# Patient Record
Sex: MALE | Race: WHITE | NOT HISPANIC OR LATINO | Employment: FULL TIME | URBAN - METROPOLITAN AREA
[De-identification: names, ages, dates, MRNs, and addresses within clinical notes are randomized per-mention and may not be internally consistent; named-entity substitution may affect disease eponyms.]

---

## 2018-10-25 ENCOUNTER — OFFICE VISIT (OUTPATIENT)
Dept: INTERNAL MEDICINE | Facility: CLINIC | Age: 48
End: 2018-10-25
Payer: COMMERCIAL

## 2018-10-25 ENCOUNTER — LAB VISIT (OUTPATIENT)
Dept: LAB | Facility: HOSPITAL | Age: 48
End: 2018-10-25
Attending: PHYSICIAN ASSISTANT
Payer: COMMERCIAL

## 2018-10-25 VITALS
TEMPERATURE: 99 F | DIASTOLIC BLOOD PRESSURE: 90 MMHG | WEIGHT: 173.06 LBS | BODY MASS INDEX: 23.44 KG/M2 | HEART RATE: 60 BPM | HEIGHT: 72 IN | SYSTOLIC BLOOD PRESSURE: 132 MMHG

## 2018-10-25 DIAGNOSIS — I10 ESSENTIAL HYPERTENSION: ICD-10-CM

## 2018-10-25 DIAGNOSIS — M72.2 PLANTAR FASCIITIS: Primary | ICD-10-CM

## 2018-10-25 DIAGNOSIS — E10.8 TYPE 1 DIABETES MELLITUS WITH COMPLICATION: ICD-10-CM

## 2018-10-25 PROBLEM — E10.9 DIABETES MELLITUS TYPE I: Status: ACTIVE | Noted: 2018-10-25

## 2018-10-25 LAB
ANION GAP SERPL CALC-SCNC: 9 MMOL/L
BUN SERPL-MCNC: 15 MG/DL
CALCIUM SERPL-MCNC: 10.3 MG/DL
CHLORIDE SERPL-SCNC: 99 MMOL/L
CO2 SERPL-SCNC: 29 MMOL/L
CREAT SERPL-MCNC: 1.4 MG/DL
EST. GFR  (AFRICAN AMERICAN): >60 ML/MIN/1.73 M^2
EST. GFR  (NON AFRICAN AMERICAN): 59.8 ML/MIN/1.73 M^2
ESTIMATED AVG GLUCOSE: 217 MG/DL
GLUCOSE SERPL-MCNC: 230 MG/DL
HBA1C MFR BLD HPLC: 9.2 %
POTASSIUM SERPL-SCNC: 4.2 MMOL/L
SODIUM SERPL-SCNC: 137 MMOL/L

## 2018-10-25 PROCEDURE — 99999 PR PBB SHADOW E&M-NEW PATIENT-LVL III: CPT | Mod: PBBFAC,,, | Performed by: PHYSICIAN ASSISTANT

## 2018-10-25 PROCEDURE — 99204 OFFICE O/P NEW MOD 45 MIN: CPT | Mod: S$GLB,,, | Performed by: PHYSICIAN ASSISTANT

## 2018-10-25 PROCEDURE — 36415 COLL VENOUS BLD VENIPUNCTURE: CPT | Mod: PO

## 2018-10-25 PROCEDURE — 3046F HEMOGLOBIN A1C LEVEL >9.0%: CPT | Mod: CPTII,S$GLB,, | Performed by: PHYSICIAN ASSISTANT

## 2018-10-25 PROCEDURE — 83036 HEMOGLOBIN GLYCOSYLATED A1C: CPT

## 2018-10-25 PROCEDURE — 3008F BODY MASS INDEX DOCD: CPT | Mod: CPTII,S$GLB,, | Performed by: PHYSICIAN ASSISTANT

## 2018-10-25 PROCEDURE — 80048 BASIC METABOLIC PNL TOTAL CA: CPT

## 2018-10-25 RX ORDER — INSULIN DEGLUDEC 200 U/ML
50 INJECTION, SOLUTION SUBCUTANEOUS EVERY MORNING
Qty: 3 ML | Refills: 0 | Status: SHIPPED | OUTPATIENT
Start: 2018-10-25 | End: 2018-11-24

## 2018-10-25 RX ORDER — INSULIN DEGLUDEC 200 U/ML
INJECTION, SOLUTION SUBCUTANEOUS
COMMUNITY
Start: 2018-08-10 | End: 2018-10-25 | Stop reason: SDUPTHER

## 2018-10-25 RX ORDER — INSULIN DEGLUDEC 200 U/ML
50 INJECTION, SOLUTION SUBCUTANEOUS EVERY MORNING
Qty: 12 ML | Refills: 3 | Status: SHIPPED | OUTPATIENT
Start: 2018-10-25 | End: 2018-10-25 | Stop reason: SDUPTHER

## 2018-10-25 RX ORDER — ATORVASTATIN CALCIUM 40 MG/1
40 TABLET, FILM COATED ORAL DAILY
COMMUNITY
End: 2020-02-20 | Stop reason: SDUPTHER

## 2018-10-25 RX ORDER — SILDENAFIL CITRATE 20 MG/1
20 TABLET ORAL 3 TIMES DAILY
COMMUNITY
End: 2018-12-20

## 2018-10-25 RX ORDER — LISINOPRIL 10 MG/1
TABLET ORAL
COMMUNITY
Start: 2018-07-30 | End: 2018-10-25 | Stop reason: SDUPTHER

## 2018-10-25 RX ORDER — LISINOPRIL 10 MG/1
10 TABLET ORAL DAILY
Qty: 90 TABLET | Refills: 3 | Status: SHIPPED | OUTPATIENT
Start: 2018-10-25 | End: 2018-12-20

## 2018-10-25 RX ORDER — INSULIN LISPRO 100 [IU]/ML
INJECTION, SOLUTION INTRAVENOUS; SUBCUTANEOUS
COMMUNITY
Start: 2018-08-21 | End: 2018-10-25 | Stop reason: SDUPTHER

## 2018-10-25 RX ORDER — INSULIN LISPRO 100 [IU]/ML
INJECTION, SOLUTION INTRAVENOUS; SUBCUTANEOUS
Qty: 15 ML | Refills: 3 | Status: SHIPPED | OUTPATIENT
Start: 2018-10-25 | End: 2019-01-03 | Stop reason: SDUPTHER

## 2018-10-25 RX ORDER — PROPRANOLOL HYDROCHLORIDE 80 MG/1
CAPSULE, EXTENDED RELEASE ORAL
COMMUNITY
Start: 2018-07-30 | End: 2020-02-20 | Stop reason: SDUPTHER

## 2018-10-25 RX ORDER — ESOMEPRAZOLE MAGNESIUM 40 MG/1
40 CAPSULE, DELAYED RELEASE ORAL
Qty: 30 CAPSULE | Refills: 2 | Status: SHIPPED | OUTPATIENT
Start: 2018-10-25 | End: 2019-03-17 | Stop reason: SDUPTHER

## 2018-10-25 RX ORDER — ESOMEPRAZOLE MAGNESIUM 40 MG/1
CAPSULE, DELAYED RELEASE ORAL
COMMUNITY
Start: 2018-10-18 | End: 2018-10-25 | Stop reason: SDUPTHER

## 2018-10-25 NOTE — PROGRESS NOTES
Subjective:       Patient ID: Rob Marx is a 48 y.o. male.    Chief Complaint: Annual Exam    HPI  Patient comes in today to est care at clinic.   He was dismissed from last PCP for missing appts.   States he has a high demanding job and a very sporadic schedule wife is with him today     He has type I DM, since age 19, also has hypertension and HLD. On medications for both of these issues.      Willing to send labs/records over to us. He is aware that he does need to keep follow ups with us as well and given he is type I, would want him to see DM management       Health Maintenance Due   Topic Date Due    Lipid Panel  1972    Foot Exam  08/19/1982    Eye Exam  08/19/1982    TETANUS VACCINE  08/19/1990    Pneumococcal PPSV23 (Medium Risk) (1) 08/19/1990    Influenza Vaccine  08/01/2018       Past Medical History:   Diagnosis Date    Diabetes mellitus type I     Hypertension        Current Outpatient Medications   Medication Sig Dispense Refill    atorvastatin (LIPITOR) 40 MG tablet Take 40 mg by mouth once daily.      esomeprazole (NEXIUM) 40 MG capsule Take 1 capsule (40 mg total) by mouth before breakfast. 30 capsule 2    HUMALOG KWIKPEN INSULIN 100 unit/mL InPn pen Inject 12 units in the morning with meals and 20 units in the evening with meals. 15 mL 3    lisinopril 10 MG tablet Take 1 tablet (10 mg total) by mouth once daily. 90 tablet 3    propranolol (INDERAL LA) 80 MG 24 hr capsule       sildenafil (REVATIO) 20 mg Tab Take 20 mg by mouth 3 (three) times daily.      TRESIBA FLEXTOUCH U-200 200 unit/mL (3 mL) InPn Inject 50 Units into the skin every morning. 3 mL 0    meloxicam (MOBIC) 7.5 MG tablet Take 1 tablet (7.5 mg total) by mouth once daily. for 7 days 7 tablet 0     No current facility-administered medications for this visit.        Review of Systems   Constitutional: Negative for activity change, fatigue, fever and unexpected weight change.   HENT: Negative for trouble  "swallowing and voice change.    Eyes: Negative for visual disturbance.   Respiratory: Negative for cough and shortness of breath.    Cardiovascular: Negative for chest pain and leg swelling.   Gastrointestinal: Negative for abdominal distention, abdominal pain and blood in stool.   Endocrine: Positive for polydipsia and polyuria.   Genitourinary: Negative for difficulty urinating and penile pain.   Musculoskeletal: Negative for arthralgias and back pain.   Skin: Negative for color change and rash.   Allergic/Immunologic: Negative.  Negative for immunocompromised state.   Neurological: Negative for dizziness and speech difficulty.   Hematological: Negative for adenopathy. Does not bruise/bleed easily.   Psychiatric/Behavioral: Negative for agitation and suicidal ideas.       Objective:   BP (!) 132/90   Pulse 60   Temp 98.6 °F (37 °C) (Oral)   Ht 5' 11.5" (1.816 m)   Wt 78.5 kg (173 lb 1 oz)   BMI 23.80 kg/m²      Physical Exam   Constitutional: He is oriented to person, place, and time. He appears well-developed and well-nourished. No distress.   HENT:   Head: Normocephalic and atraumatic.   Eyes: EOM are normal. Pupils are equal, round, and reactive to light.   Neck: Normal range of motion. Neck supple.   Cardiovascular: Normal rate, regular rhythm and normal heart sounds.   Pulses:       Dorsalis pedis pulses are 2+ on the right side, and 2+ on the left side.        Posterior tibial pulses are 2+ on the right side, and 2+ on the left side.   Pulmonary/Chest: Effort normal and breath sounds normal.   Abdominal: Soft.   Musculoskeletal: He exhibits no edema.        Right foot: There is normal range of motion and no deformity.        Left foot: There is normal range of motion and no deformity.   Feet:   Right Foot:   Protective Sensation: 4 sites tested. 4 sites sensed.   Skin Integrity: Negative for ulcer, blister or skin breakdown.   Left Foot:   Protective Sensation: 4 sites tested. 4 sites sensed.   Skin " Integrity: Negative for ulcer, blister or skin breakdown.   Neurological: He is alert and oriented to person, place, and time.   Skin: Capillary refill takes less than 2 seconds.   Psychiatric: He has a normal mood and affect. His behavior is normal. Judgment and thought content normal.       TTP right foot at arch   Lab Results   Component Value Date     10/25/2018    K 4.2 10/25/2018    CL 99 10/25/2018    CREATININE 1.4 10/25/2018    BUN 15 10/25/2018    CO2 29 10/25/2018    HGBA1C 9.2 (H) 10/25/2018       Assessment:       1. Plantar fasciitis    2. Type 1 diabetes mellitus with complication    3. Essential hypertension        Plan:   Plantar fasciitis  Renal function ok, ok for low dose of mobic for short term for pain   Type 1 diabetes mellitus with complication  -     Basic metabolic panel; Future; Expected date: 10/25/2018  -     Hemoglobin A1c; Future; Expected date: 10/25/2018  Follow up with DM team   Essential hypertension  Refill AcE- I   rttc in a month   Other orders  -     HUMALOG KWIKPEN INSULIN 100 unit/mL InPn pen; Inject 12 units in the morning with meals and 20 units in the evening with meals.  Dispense: 15 mL; Refill: 3  -     Discontinue: TRESIBA FLEXTOUCH U-200 200 unit/mL (3 mL) InPn; Inject 50 Units into the skin every morning.  Dispense: 12 mL; Refill: 3  -     TRESIBA FLEXTOUCH U-200 200 unit/mL (3 mL) InPn; Inject 50 Units into the skin every morning.  Dispense: 3 mL; Refill: 0  -     lisinopril 10 MG tablet; Take 1 tablet (10 mg total) by mouth once daily.  Dispense: 90 tablet; Refill: 3  -     esomeprazole (NEXIUM) 40 MG capsule; Take 1 capsule (40 mg total) by mouth before breakfast.  Dispense: 30 capsule; Refill: 2  -     meloxicam (MOBIC) 7.5 MG tablet; Take 1 tablet (7.5 mg total) by mouth once daily. for 7 days  Dispense: 7 tablet; Refill: 0      recrods requested   No Follow-up on file.

## 2018-10-26 ENCOUNTER — TELEPHONE (OUTPATIENT)
Dept: INTERNAL MEDICINE | Facility: CLINIC | Age: 48
End: 2018-10-26

## 2018-10-26 RX ORDER — MELOXICAM 7.5 MG/1
7.5 TABLET ORAL DAILY
Qty: 7 TABLET | Refills: 0 | Status: SHIPPED | OUTPATIENT
Start: 2018-10-26 | End: 2018-11-02

## 2018-10-26 NOTE — TELEPHONE ENCOUNTER
----- Message from John Mariee sent at 10/26/2018  1:19 PM CDT -----  Contact: [phone]  Would like to consult with nurse regarding lab results.  Please call back at [phone].  Md Fredi

## 2018-10-26 NOTE — TELEPHONE ENCOUNTER
Informed both spouse and pt that a message will be sent to the provider for interpretation of lab results and recommendations. They verbalized understanding.

## 2018-10-26 NOTE — PROGRESS NOTES
A1c 9.2 which is uncontrolled, not sure what his last one was   Kidneys are looking ok   See back in 2 months

## 2018-10-26 NOTE — TELEPHONE ENCOUNTER
----- Message from Sis Hidalgo sent at 10/26/2018  4:01 PM CDT -----  Contact: Antonieta- spouse  Would like to know if rx for inflammation will be called in at pharmacy. Please call back at [phone].      Pt uses:    [id number] [place] - [address]  [address] [facility]  [place] [address]  Phone: [phone] Fax: [phone]        Thanks,   Sis Hidalgo

## 2018-11-26 RX ORDER — INSULIN DEGLUDEC 200 U/ML
50 INJECTION, SOLUTION SUBCUTANEOUS EVERY MORNING
Qty: 3 ML | Refills: 0 | Status: CANCELLED | OUTPATIENT
Start: 2018-11-26 | End: 2018-12-26

## 2018-11-26 NOTE — TELEPHONE ENCOUNTER
----- Message from Jocy Cintronsharifaneudy sent at 11/26/2018  3:53 PM CST -----  Contact: antonieta/wife [phone]  States that pt is out of medication.     1. What is the name of the medication you are requesting? tresiba insulin  2. What is the dose? unk  3. How do you take the medication? Orally, topically, etc? injection  4. How often do you take this medication? daily  5. Do you need a 30 day or 90 day supply? 30 day  6. How many refills are you requesting? 1  7. What is your preferred pharmacy and location of the pharmacy?     [facility] [address] [place] [address], [place] - [address] [facility]  [address] [facility] [address]  Phone: [phone] Fax: [phone]      8. Who can we contact with further questions? Antonieta/wife [phone]

## 2018-11-27 ENCOUNTER — TELEPHONE (OUTPATIENT)
Dept: INTERNAL MEDICINE | Facility: CLINIC | Age: 48
End: 2018-11-27

## 2018-11-27 NOTE — TELEPHONE ENCOUNTER
----- Message from Shirley Dominguez sent at 11/27/2018 10:27 AM CST -----  Contact: wife  She's calling in regards to Rx refill ,pls call pt back at [phone] or [phone]    [facility] - [place] - [address]  [address] [place] [address]  Phone: [phone] Fax: [phone]

## 2018-11-27 NOTE — TELEPHONE ENCOUNTER
Per provider pt has been scheduled with Diabetes Management for dosing. Pt is aware of date, time and location of appt.

## 2018-12-03 ENCOUNTER — TELEPHONE (OUTPATIENT)
Dept: INTERNAL MEDICINE | Facility: CLINIC | Age: 48
End: 2018-12-03

## 2018-12-03 RX ORDER — INSULIN DEGLUDEC 200 U/ML
50 INJECTION, SOLUTION SUBCUTANEOUS DAILY
Qty: 9 ML | Refills: 1 | Status: SHIPPED | OUTPATIENT
Start: 2018-12-03 | End: 2019-01-02

## 2018-12-03 NOTE — TELEPHONE ENCOUNTER
----- Message from Taniya Hendrickson sent at 12/3/2018  3:07 PM CST -----  Contact: pt  He is calling to get a refill...    1. What is the name of the medication you are requesting? Tresiba  2. What is the dose? 50 units   3. How do you take the medication? Orally, topically, etc? Insulin pen  4. How often do you take this medication? Once daily  5. Do you need a 30 day or 90 day supply? 30  6. How many refills are you requesting? 1  7. What is your preferred pharmacy and location of the pharmacy? [facility] in [place], [place]. [address] [facility]   8. Who can we contact with further questions? [phone] (home) [phone] (work)

## 2018-12-05 ENCOUNTER — TELEPHONE (OUTPATIENT)
Dept: DIABETES | Facility: CLINIC | Age: 48
End: 2018-12-05

## 2018-12-05 NOTE — TELEPHONE ENCOUNTER
----- Message from Mari Chavarria sent at 12/5/2018  3:44 PM CST -----  Contact: Pt  Pt  Request a sooner appointment requesting Friday, pt stated matter is urgent due to transportation arrangements  ....Call back: [phone]

## 2018-12-05 NOTE — TELEPHONE ENCOUNTER
Returned patient's call. Patient stated that he was told that he had a 7am appointment and received a message alert that it was for 11am. Due to work, patient verbalized that he is unable to make the appointment,but would like a Friday or Monday soon. Patient was given an appointment with Ramona Munson for Monday at 4pm. Patient was very pleasant. Address was given to patient.

## 2018-12-07 NOTE — PROGRESS NOTES
"Subjective:         Patient ID: Rob Marx is a 48 y.o. male.  Patient's current PCP is [facility]       Chief Complaint: Diabetes Mellitus    HPI  Rob Marx is a 48 y.o. White male presenting for a new consult for Type I diabetes. Patient has been diagnosed with diabetes since age 19  and has the following complications from diabetes: HTN, Hyperlipidemia. Blood glucose testing is performed regularly. In the past 2 weeks patient reports blood glucose values to have approximately ranged from 160-180s before lunch. The patient reports medication compliance most of the time and diet noncompliance much of the time. Condition: uncontrolled , worsened He denies recent hospital admissions, denies emergency room visits, reports occassional hypoglycemia at night due to working days and night.       Height: 5' 11.5" (181.6 cm)  //  Weight: 81.5 kg (179 lb 10.8 oz), Body mass index is 24.71 kg/m².  His blood sugar in clinic today is:   Lab Results   Component Value Date    POCGLU 297 (A) 12/10/2018       Labs reviewed and are noted below.    His most recent A1C is:  Lab Results   Component Value Date    HGBA1C 9.2 (H) 10/25/2018     No results found for: CPEPTIDE  No results found for: GLUTAMICACID  Lab Results   Component Value Date     10/25/2018    K 4.2 10/25/2018    CL 99 10/25/2018    CREATININE 1.4 10/25/2018    BUN 15 10/25/2018    CO2 29 10/25/2018    HGBA1C 9.2 (H) 10/25/2018     CMP  Sodium   Date Value Ref Range Status   10/25/2018 137 136 - 145 mmol/L Final     Potassium   Date Value Ref Range Status   10/25/2018 4.2 3.5 - 5.1 mmol/L Final     Chloride   Date Value Ref Range Status   10/25/2018 99 95 - 110 mmol/L Final     CO2   Date Value Ref Range Status   10/25/2018 29 23 - 29 mmol/L Final     Glucose   Date Value Ref Range Status   10/25/2018 230 (H) 70 - 110 mg/dL Final     BUN, Bld   Date Value Ref Range Status   10/25/2018 15 6 - 20 mg/dL Final     Creatinine   Date Value " Ref Range Status   10/25/2018 1.4 0.5 - 1.4 mg/dL Final     Calcium   Date Value Ref Range Status   10/25/2018 10.3 8.7 - 10.5 mg/dL Final     Anion Gap   Date Value Ref Range Status   10/25/2018 9 8 - 16 mmol/L Final     eGFR if    Date Value Ref Range Status   10/25/2018 >60.0 >60 mL/min/1.73 m^2 Final     eGFR if non    Date Value Ref Range Status   10/25/2018 59.8 (A) >60 mL/min/1.73 m^2 Final     Comment:     Calculation used to obtain the estimated glomerular filtration  rate (eGFR) is the CKD-EPI equation.            CURRENT DM MEDICATIONS:   Diabetes Medications             HUMALOG KWIKPEN INSULIN 100 unit/mL InPn pen Inject 12 units in the morning with meals and 20 units in the evening with meals.    insulin degludec (TRESIBA FLEXTOUCH U-200) 200 unit/mL (3 mL) InPn Inject 50 Units into the skin once daily. (morning)              Health Maintenance   Topic Date Due    Lipid Panel  1972    Eye Exam  08/19/1982    TETANUS VACCINE  08/19/1990    Pneumococcal Vaccine (Medium Risk) (1 of 1 - PPSV23) 08/19/1991    Influenza Vaccine  08/01/2018    Hemoglobin A1c  04/25/2019    Foot Exam  12/10/2019       STANDARDS OF CARE:  Current Ophthalmologist/Optometrist: recommended annually;patient will schedule an appt     Current Dentist: recommended annually  Current Podiatrist: recommended annually  He  is to be enrolled in diabetes education classes.     LIFESTYLE:  ACTIVITY LEVEL: Sedentary  EXERCISE: none  MEAL PLANNING: Patient reports number of meals per day to be 1 and number of snacks per day to be 2  BLOOD GLUCOSE TESTING: Patient is testing 2 times per day     Diabetes Management Status    Statin: Taking  ACE/ARB: Taking    Screening or Prevention Patient's value Goal Complete/Controlled?   HgA1C Testing and Control   Lab Results   Component Value Date    HGBA1C 9.2 (H) 10/25/2018      Annually/Less than 8% No   Lipid profile Most Recent Lipid Panel Health  Maintenance Topic Completion: Not Found Annually No   LDL control No results found for: LDLCALC Annually/Less than 100 mg/dl  No   Nephropathy screening No results found for: LABMICR  No results found for: PROTEINUA Annually No   Blood pressure BP Readings from Last 1 Encounters:   12/10/18 (!) 146/98    Less than 140/90 Yes   Dilated retinal exam Most Recent Eye Exam Date: Not Found Annually No   Foot exam   : 12/10/2018 Annually Yes     Review of Systems   Constitutional: Negative for activity change and appetite change.   Eyes: Negative for visual disturbance.   Gastrointestinal: Negative for constipation and diarrhea.   Endocrine: Negative for polydipsia, polyphagia and polyuria.   Neurological: Negative for syncope and weakness.   Psychiatric/Behavioral: Negative for confusion.         Objective:      Physical Exam   Constitutional: He is oriented to person, place, and time. He appears well-developed and well-nourished.  Non-toxic appearance. He does not have a sickly appearance. He does not appear ill. No distress.   Eyes: EOM are normal. Pupils are equal, round, and reactive to light.   Neck: Normal range of motion and full passive range of motion without pain. Neck supple. No thyromegaly present.   Cardiovascular: Normal rate, regular rhythm, normal heart sounds and normal pulses.   Pulses:       Radial pulses are 2+ on the right side, and 2+ on the left side.        Dorsalis pedis pulses are 2+ on the right side, and 2+ on the left side.   Pulmonary/Chest: Effort normal and breath sounds normal.   Feet:   Right Foot:   Protective Sensation: 6 sites tested. 6 sites sensed.   Skin Integrity: Negative for ulcer or skin breakdown.   Left Foot:   Protective Sensation: 6 sites tested. 6 sites sensed.   Skin Integrity: Negative for ulcer or skin breakdown.   Neurological: He is alert and oriented to person, place, and time. He has normal strength. No sensory deficit.   Skin: He is not diaphoretic.   Psychiatric:  "He has a normal mood and affect. His speech is normal and behavior is normal. Cognition and memory are normal.       Assessment:       1. Type 1 diabetes mellitus with complication        Plan:   Type 1 diabetes mellitus with complication  -     Glutamic acid decarboxylase; Future; Expected date: 01/25/2019  -     C-peptide; Future; Expected date: 01/25/2019  -     Hemoglobin A1c; Future; Expected date: 01/25/2019  -     POCT Glucose, Hand-Held Device  -     blood-glucose meter kit; Use as instructed. Insurance preferred  Dispense: 1 each; Refill: 0  -     blood sugar diagnostic Strp; 1 each by Misc.(Non-Drug; Combo Route) route 4 (four) times daily. Insurance preferred  Dispense: 100 strip; Refill: 11  -     lancets Misc; 1 each by Misc.(Non-Drug; Combo Route) route 4 (four) times daily. Insurance preferred  Dispense: 100 each; Refill: 11  -     pen needle, diabetic 32 gauge x 5/32" Ndle; 1 each by Misc.(Non-Drug; Combo Route) route once daily.  Dispense: 100 each; Refill: 11      BP- Good control  LDL- labs needed    Additional Plan Details:    1.) Patient was instructed to monitor blood glucose 4-5x daily, fasting and ac dinner or at bedtime. Discussed ADA goal for fasting blood sugar, 80 - 130mg/dL; pp blood sugars below 180 mg/dl. Also, discussed prevention of hypoglycemia and the need to adjust goals to higher levels if persistent hypoglycemia.  Reminded to bring BG records or meter to each visit for review. Patient instructed to send in log weekly for review and potential medication adjustments.  2.) Reviewed pathophysiology of Type 1 diabetes, complications related to the disease, importance of annual dilated eye exam and daily foot examination.  3.) We discussed the ADA recommendations, which are as follows:  Hemoglobin A1c below 7.0 %. All patients with diabetes should be on statins unless contraindicated.  ACE or ARB therapy if not contraindicated.    4.) Continue medications as prescribed.  My [facility] " e-mail or phone review in one week with BG records for adjustment of medication.  5.) Meal planning teaching: Reviewed carb counting, portion control, importance of spacing meals throughout the day to prevent post prandial elevations.  6.) Discussed activity with related benefits, methods, and precautions. Recommended patient start or continue some form of exercise and increase as tolerated to 30 minutes per day to aid in control of BGs.  7.) A1C, TSH, Lipid Panel, CMP with eGFR and Micro/Creatinine are utd or were ordered per ADA protocol.  8.) Return to clinic in 1 month for follow up. The patient was explained the above plan and given opportunity to ask questions.  He understands, chooses and consents to this plan and accepts all the risks, which include but are not limited to the risks mentioned above. He understands the alternative of having no testing, interventions or treatments at this time. He left content and without further questions.     A total of 60 minutes was spent in face to face time, of which over 50% was spent in counseling patient on disease process, complications, treatment, and side effects of medications.

## 2018-12-10 ENCOUNTER — OFFICE VISIT (OUTPATIENT)
Dept: DIABETES | Facility: CLINIC | Age: 48
End: 2018-12-10
Payer: COMMERCIAL

## 2018-12-10 VITALS
SYSTOLIC BLOOD PRESSURE: 146 MMHG | HEIGHT: 72 IN | WEIGHT: 179.69 LBS | BODY MASS INDEX: 24.34 KG/M2 | DIASTOLIC BLOOD PRESSURE: 98 MMHG

## 2018-12-10 DIAGNOSIS — E10.8 TYPE 1 DIABETES MELLITUS WITH COMPLICATION: Primary | ICD-10-CM

## 2018-12-10 LAB — GLUCOSE SERPL-MCNC: 297 MG/DL (ref 70–110)

## 2018-12-10 PROCEDURE — 3008F BODY MASS INDEX DOCD: CPT | Mod: CPTII,S$GLB,, | Performed by: NURSE PRACTITIONER

## 2018-12-10 PROCEDURE — 99205 OFFICE O/P NEW HI 60 MIN: CPT | Mod: S$GLB,,, | Performed by: NURSE PRACTITIONER

## 2018-12-10 PROCEDURE — 3046F HEMOGLOBIN A1C LEVEL >9.0%: CPT | Mod: CPTII,S$GLB,, | Performed by: NURSE PRACTITIONER

## 2018-12-10 PROCEDURE — 99999 PR PBB SHADOW E&M-EST. PATIENT-LVL III: CPT | Mod: PBBFAC,,, | Performed by: NURSE PRACTITIONER

## 2018-12-10 PROCEDURE — 82962 GLUCOSE BLOOD TEST: CPT | Mod: S$GLB,,, | Performed by: NURSE PRACTITIONER

## 2018-12-10 RX ORDER — LANCETS
1 EACH MISCELLANEOUS 4 TIMES DAILY
Qty: 100 EACH | Refills: 11 | Status: SHIPPED | OUTPATIENT
Start: 2018-12-10 | End: 2019-01-29

## 2018-12-10 RX ORDER — PEN NEEDLE, DIABETIC 30 GX3/16"
1 NEEDLE, DISPOSABLE MISCELLANEOUS DAILY
Qty: 100 EACH | Refills: 11 | Status: SHIPPED | OUTPATIENT
Start: 2018-12-10 | End: 2020-09-24 | Stop reason: SDUPTHER

## 2018-12-10 RX ORDER — INSULIN PUMP SYRINGE, 3 ML
EACH MISCELLANEOUS
Qty: 1 EACH | Refills: 0 | Status: SHIPPED | OUTPATIENT
Start: 2018-12-10 | End: 2019-01-29

## 2018-12-10 NOTE — LETTER
December 10, 2018      MICHAELA Hoover  [address] [facility] [address]           [facility]  [address] [place] [address]  [place] [address]  Phone: [phone]  Fax: [phone]          Patient: Rob Marx   MR Number: [id number]   YOB: 1970   Date of Visit: 12/10/2018       Dear Stella Tovar:    Thank you for referring Rob Marx to me for evaluation. Attached you will find relevant portions of my assessment and plan of care.    If you have questions, please do not hesitate to call me. I look forward to following Rob Marx along with you.    Sincerely,    Ramona Munson, BRENT    Enclosure  CC:  No Recipients    If you would like to receive this communication electronically, please contact [email or web address] or [phone] to request more information on [employer] Link access.    For providers and/or their staff who would like to refer a patient to [facility], please contact us through our one-stop-shop provider referral line, Cj Rice, at [phone].    If you feel you have received this communication in error or would no longer like to receive these types of communications, please e-mail [email or web address]

## 2018-12-10 NOTE — PATIENT INSTRUCTIONS
PATIENT INSTRUCTIONS  - Follow up as scheduled.   - Carb Count: 30-45G/meal and 15G/snack  - Exercise: Goal is 150 minutes or more per week  - Bring meter and blood sugar log to each appointment.       - Blood Sugar Goals:  1. The goal for fasting blood sugars is 80 -130 mg/dl.   2. The goal for the 2 hour after meal blood sugars is below 180 mg/dl.  3. Blood sugars below 70 are considered LOW and you must eat or drink 15G of carbohydrates immediately, then recheck blood sugar after 15 minutes to ensure blood sugar has returned to normal range, (70 or above)                                                              Diabetes (General Information)  Diabetes is a long-term health problem. It means your body does not make enough insulin. Or it may mean that your body cannot use the insulin it makes. Insulin is a hormone in your body. It lets blood sugar (glucose) reach the cells in your body. All of your cells need glucose for fuel.  When you have diabetes, the glucose in your blood builds up because it cannot get into the cells. This buildup is called high blood sugar (hyperglycemia).  Your blood sugar level depends on several things. It depends on what kind of food you eat and how much of it you eat. It also depends on how much exercise you get, and how much insulin you have in your body. Eating too much of the wrong kinds of food or not taking diabetes medicine on time can cause high blood sugar. Infections can cause high blood sugar even if you are taking medicines correctly.  These things can also cause low blood sugar:  · Missing meals  · Not eating enough food  · Taking too much diabetes medicine  Diabetes can cause serious problems over time if you do not get treated. These problems include heart disease, stroke, kidney failure, and blindness. They also include nerve pain or loss of feeling in your legs and feet, and gangrene of the feet. By keeping your blood sugar under control you can prevent or delay  these problems.  Normal blood sugar levels are 80 to 100 before a meal and less than 180 in the 1 to 2 hours after a meal.  Home care  Follow these guidelines when caring for yourself at home:  · Follow the diet your healthcare provider gives you. Take insulin or other diabetes medicine exactly as told to.  · Watch your blood sugar as you are told to. Keep a log of your results. This will help your provider change your medicines to keep your blood sugar under control.  · Try to reach your ideal weight. You may be able to cut back on or not have to take diabetes medicine if you eat the right foods and get exercise.  · Do not smoke. Smoking worsens the effects of diabetes on your circulation. You are much more likely to have a heart attack if you have diabetes and you smoke.  · Take good care of your feet. If you have lost feeling in your feet, you may not see an injury or infection. Check your feet and between your toes at least once a week.  · Wear a medical alert bracelet or necklace, or carry a card in your wallet that says you have diabetes. This will help healthcare providers give you the right care if you get very ill and cannot tell them that you have diabetes.  Sick day plan  If you get a cold, the flu, or a bacterial or viral infection, take these steps:  · Look at your diabetes sick plan and call your healthcare provider as you were told to. You may need to call your provider right away if:  ¨ Your blood sugar is above 240 while taking your diabetes medicine  ¨ Your urine ketone levels are above normal or high  ¨ You have been vomiting more than 6 hours  ¨ You have trouble breathing or your breath ha s a fruity smell  ¨ You have a high fever  ¨ You have a fever for several days and you are not getting better  ¨ You get light-headed and are sleepier than usual  · Keep taking your diabetes pills (oral medicine) even if you have been vomiting and are feeling sick. Call your provider right away because you may  need insulin to lower your blood sugar until you recover from your illness.  · Keep taking your insulin even if you have been vomiting and are feeling sick. Call your provider right away to ask if you need to change your insulin dose. This will depend on your blood sugar results.  · Check your blood sugar every 2 to 4 hours, or at least 4 times a day.  · Check your ketones often. If you are vomiting and having diarrhea, watch them more often.  · Do not skip meals. Try to eat small meals on a regular schedule. Do this even if you do not feel like eating.  · Drink water or other liquids that do not have caffeine or calories. This will keep you from getting dehydrated. If you are nauseated or vomiting, takes small sips every 5 minutes. To prevent dehydration try to drink a cup (8 ounces) of fluids every hour while you are awake.  General care  Always bring a source of fast-acting sugar with you in case you have symptoms of low blood sugar (below 70). At the first sign of low blood sugar, eat or drink 15 to 20 grams of fast-acting sugar to raise your blood sugar. Examples are:  · 3 to 4 glucose tablets. You can buy these at most [employer].  · 4 ounces (1/2 cup) of regular (not diet) soft drinks  · 4 ounces (1/2 cup) of any fruit juice  · 8 ounces (1 cup) of milk  · 5 to 6 pieces of hard candy  · 1 tablespoon of honey  Check your blood sugar 15 minutes after treating yourself. If it is still below 70, take 15 to 20 more grams of fast-acting sugar. Test again in 15 minutes. If it returns to normal (70 or above), eat a snack or meal to keep your blood sugar in a safe range. If it stays low, call your doctor or go to an emergency room.  Follow-up care  Follow-up with your healthcare provider, or as advised. For more information about diabetes, visit the American Diabetes Association website at www.diabetes.org or call 603-134-7451.  When to seek medical advice  Call your healthcare provider right away if you have any of  these symptoms of high blood sugar:  · Frequent urination  · Dizziness  · Drowsiness  · Thirst  · Headache  · Nausea or vomiting  · Abdominal pain  · Eyesight changes  · Fast breathing  · Confusion or loss of consciousness  Also call your provider right away if you have any of these signs of low blood sugar:  · Fatigue  · Headache  · Shakes  · Excess sweating  · Hunger  · Feeling anxious or restless  · Eyesight changes  · Drowsiness  · Weakness  · Confusion or loss of consciousness  Call 911  Call for emergency help right away if any of these occur:  · Chest pain or shortness of breath  · Dizziness or fainting  · Weakness of an arm or leg or one side of the face  · Trouble speaking or seeing   Date Last Reviewed: 6/1/2016 © 2000-2016 [employer]. [address] [place] [address], [place] [address]. All rights reserved. This information is not intended as a substitute for professional medical care. Always follow your healthcare professional's instructions.                                                                     Understanding Type 2 Diabetes  When your body is working normally, the food you eat is digested and used as fuel. This fuel supplies energy to the bodys cells. When you have diabetes, the fuel cant enter the cells. Without treatment, diabetes can cause serious long-term health problems.     Your body breaks down the food you eat into glucose.          How the body gets energy  The digestive system breaks down food, resulting in a sugar called glucose. Some of this glucose is stored in the liver. But most of it enters the bloodstream and travels to the cells to be used as fuel. Glucose needs the help of a hormone called insulin to enter the cells. Insulin is made in the pancreas. It is released into the bloodstream in response to the presence of glucose in the blood. Think of insulin as a key. When insulin reaches a cell, it attaches to the cell wall. This signals the cell to create an  opening that allows glucose to enter the cell.  When you have type 2 diabetes  Early in type 2 diabetes, your cells dont respond properly to insulin. Because of this, less glucose than normal moves into cells. This is called insulin resistance. In response, the pancreas makes more insulin. But eventually, the pancreas cant produce enough insulin to overcome insulin resistance. As less and less glucose enters cells, it builds up to a harmful level in the bloodstream. This is known as high blood sugar or hyperglycemia. The result is type 2 diabetes. The cells become starved for energy, which can leave you feeling tired and rundown.  Why high blood sugar is a problem  If high blood sugar is not controlled, blood vessels throughout the body become damaged. Prolonged high blood sugar affects organs, blood vessels, and nerves. As a result, the risks of damage to the heart, kidneys, eyes, and limbs increase. Diabetes also makes other problems, such as high blood pressure and high cholesterol, more dangerous. Over time, people with uncontrolled high blood sugar have an increase in risk of dying of, or being disabled by, heart attack or stroke.  Date Last Reviewed: 7/1/2016  © 3279-0348 [employer]. [address] [place] [address]. All rights reserved. This information is not intended as a substitute for professional medical care. Always follow your healthcare professional's instructions.                                                            Using a Blood Sugar Log    You have diabetes. This means your body has trouble regulating a sugar called glucose. To help manage your diabetes, youll need to check your blood sugar level as directed by your healthcare provider. Keeping a log of your blood sugar levels will help you track your blood sugar readings. Its a simple and easy way to see how well you are controlling your diabetes.  Checking your blood sugar level  You can check your blood sugar  level with a blood glucose meter. Youll first prick the side of your finger with a tiny lancet to draw a tiny drop of blood onto the test strip. Some glucose meters let you use another place on your body to test. But these other places should not be used in some cases as they may be inaccurate. Follow the instructions for your glucose meter. And talk with your healthcare provider before doing the test on other places.  The strip goes into the meter first, then a drop of blood is placed on the tip of the strip. The meter then shows a reading that tells you the level of your blood sugar. Your readings should be in your target range as often as possible. This means not too high or too low. Staying in this range helps lower your risk for complications. Your healthcare provider will help you figure out the target range that is best for you.  Tracking your readings  Every time you check your blood sugar, use your log to keep track of your readings. Your meter will also probably have a memory feature that your healthcare provider can check at your next visit. You may be advised by your healthcare provider to check your blood sugar in the morning, at bedtime, and before and after meals. Be sure to write down all of your numbers. Also use your log to record things that might have affected your blood sugar. Some examples include being sick, certain medicines, being physically active, feeling stressed, or skipping meals.   Lessons learned from your readings  Tracking your blood sugar readings helps you see patterns. These patterns tell you how your actions affect your blood sugar. For instance, you may have higher numbers after eating certain foods or lower numbers after exercise. They just help you understand how to stay in your target range more often, so that your diabetes remains in good control.  Sharing your log with your healthcare team  Bring your blood sugar log and glucose meter with you to all of your healthcare  appointments. This can help your healthcare team make changes to your treatment plan, if needed. This may involve making changes in what you eat, what medicines you take, or how much you exercise.  To learn more  The resources below can help you learn more:  · American Diabetes Association 139-507-6451 www.diabetes.org  · [employer] 025-889-7213 www.lighthouse.org  · National [facility] [place] 796-357-4898 www.nei.nih.gov  · [employer] 353-738-3382 www.hormone.org  Date Last Reviewed: 5/1/2016 © 2000-2016 [employer]. [address] [place] [address], [place] [address]. All rights reserved. This information is not intended as a substitute for professional medical care. Always follow your healthcare professional's instructions.                                                                                            Diabetes: Exams and Tests    For your diabetes care, you may see your primary care provider or a specialist 2 to 4 times a year, or as directed. This page lists some of the regular exams and tests recommended for people with diabetes. To learn more, contact the American Diabetes Association (808-932-7122, www.diabetes.org).  Tests and immunizations  These should be done at least as often as stated below:  · Blood pressure check: every healthcare provider visit  · A1C: at first, every 3 months; if controlled, then every 3 to 6 months   · Cholesterol and blood lipid tests: at least every 12 months.  · Urine tests for kidney function: every 12 months  · Flu shots: once a year  · Pneumonia shots: talk with your healthcare provider about which pneumonia vaccines are right for you  · Hepatitis B shots: as soon as possible if youre under 60, or as advised by your healthcare provider if youre older than 60  · Shingles vaccine after age 60, even if you have already had shingles   · Other tests or vaccines: as advised by your healthcare provider  · Individualized medical nutrition  therapy: at least once, then as needed  · Stop smoking counseling, if you still smoke, at each visit   Regular exams  The following exams help keep you healthy:  · Foot exams. Nerve and blood vessel problems can affect your feet sooner than other parts of your body. Make sure that your healthcare provider checks your feet at every office visit.  · Eye exams. You can have problems with your eyes even if you dont have trouble seeing. An ophthalmologist (eye healthcare provider) or specially trained optometrist will give you a dilated eye exam at least once a year. If you see dark spots, see poorly in dim light, have eye pain or pressure, or notice any other problems, tell your healthcare provider right away.  · Dental exams. Gum disease (also called periodontal disease) and other mouth problems are common in people with diabetes. To help prevent these problems, see your dentist two or more times a year.  Ask your healthcare provider what other exams youll need on a regular basis.  Date Last Reviewed: 6/1/2016 © 2000-2016 [employer]. [address] [place] [address], [place] [address]. All rights reserved. This information is not intended as a substitute for professional medical care. Always follow your healthcare professional's instructions.

## 2018-12-11 ENCOUNTER — TELEPHONE (OUTPATIENT)
Dept: DIABETES | Facility: CLINIC | Age: 48
End: 2018-12-11

## 2018-12-11 NOTE — TELEPHONE ENCOUNTER
----- Message from Ramona Munson NP sent at 12/10/2018  4:34 PM CST -----  Please schedule additional labs that were ordered after the visit, thanks

## 2018-12-20 ENCOUNTER — OFFICE VISIT (OUTPATIENT)
Dept: INTERNAL MEDICINE | Facility: CLINIC | Age: 48
End: 2018-12-20
Payer: COMMERCIAL

## 2018-12-20 ENCOUNTER — TELEPHONE (OUTPATIENT)
Dept: INTERNAL MEDICINE | Facility: CLINIC | Age: 48
End: 2018-12-20

## 2018-12-20 VITALS
DIASTOLIC BLOOD PRESSURE: 100 MMHG | SYSTOLIC BLOOD PRESSURE: 180 MMHG | HEART RATE: 96 BPM | WEIGHT: 179.25 LBS | HEIGHT: 72 IN | TEMPERATURE: 98 F | BODY MASS INDEX: 24.28 KG/M2

## 2018-12-20 DIAGNOSIS — E11.59 HYPERTENSION ASSOCIATED WITH DIABETES: Primary | ICD-10-CM

## 2018-12-20 DIAGNOSIS — I15.2 HYPERTENSION ASSOCIATED WITH DIABETES: Primary | ICD-10-CM

## 2018-12-20 DIAGNOSIS — E10.8 TYPE 1 DIABETES MELLITUS WITH COMPLICATION: ICD-10-CM

## 2018-12-20 PROCEDURE — 3046F HEMOGLOBIN A1C LEVEL >9.0%: CPT | Mod: CPTII,S$GLB,, | Performed by: PHYSICIAN ASSISTANT

## 2018-12-20 PROCEDURE — 99999 PR PBB SHADOW E&M-EST. PATIENT-LVL III: CPT | Mod: PBBFAC,,, | Performed by: PHYSICIAN ASSISTANT

## 2018-12-20 PROCEDURE — 99213 OFFICE O/P EST LOW 20 MIN: CPT | Mod: S$GLB,,, | Performed by: PHYSICIAN ASSISTANT

## 2018-12-20 PROCEDURE — 3008F BODY MASS INDEX DOCD: CPT | Mod: CPTII,S$GLB,, | Performed by: PHYSICIAN ASSISTANT

## 2018-12-20 RX ORDER — SILDENAFIL 50 MG/1
50 TABLET, FILM COATED ORAL DAILY PRN
Qty: 5 TABLET | Refills: 1 | Status: SHIPPED | OUTPATIENT
Start: 2018-12-20 | End: 2018-12-26 | Stop reason: ALTCHOICE

## 2018-12-20 RX ORDER — CANDESARTAN 16 MG/1
16 TABLET ORAL DAILY
Qty: 30 TABLET | Refills: 3 | Status: SHIPPED | OUTPATIENT
Start: 2018-12-20 | End: 2019-10-18 | Stop reason: SDUPTHER

## 2018-12-20 NOTE — TELEPHONE ENCOUNTER
----- Message from Darrell Carreno sent at 12/20/2018 11:25 AM CST -----  Contact: self  Requesting call back regarding getting previous rx due to increase in price and pt cant afford it. Please call back at [phone].      Thanks,  Darrell Carreno

## 2018-12-20 NOTE — TELEPHONE ENCOUNTER
Pt stated that viagra rx is too expensive he would like previous rx Revatio sent at a higher dose.

## 2018-12-20 NOTE — PROGRESS NOTES
"Subjective:       Patient ID: Rob Marx is a 48 y.o. male.    Chief Complaint: Follow-up    HPI  Patient comes in today for follow up DM hypertension     hypertension and DM uncontrolled.     Currently taking lisinopril         Health Maintenance Due   Topic Date Due    Lipid Panel  1972    Eye Exam  08/19/1982    TETANUS VACCINE  08/19/1990    Pneumococcal Vaccine (Medium Risk) (1 of 1 - PPSV23) 08/19/1991    Influenza Vaccine  08/01/2018       Past Medical History:   Diagnosis Date    Diabetes mellitus type I     Hypertension        Current Outpatient Medications   Medication Sig Dispense Refill    atorvastatin (LIPITOR) 40 MG tablet Take 40 mg by mouth once daily.      blood sugar diagnostic Strp 1 each by Misc.(Non-Drug; Combo Route) route 4 (four) times daily. Insurance preferred 100 strip 11    blood-glucose meter kit Use as instructed. Insurance preferred 1 each 0    esomeprazole (NEXIUM) 40 MG capsule Take 1 capsule (40 mg total) by mouth before breakfast. 30 capsule 2    HUMALOG KWIKPEN INSULIN 100 unit/mL InPn pen Inject 12 units in the morning with meals and 20 units in the evening with meals. 15 mL 3    insulin degludec (TRESIBA FLEXTOUCH U-200) 200 unit/mL (3 mL) InPn Inject 50 Units into the skin once daily. 9 mL 1    lancets Misc 1 each by Misc.(Non-Drug; Combo Route) route 4 (four) times daily. Insurance preferred 100 each 11    pen needle, diabetic 32 gauge x 5/32" Ndle 1 each by Misc.(Non-Drug; Combo Route) route once daily. 100 each 11    propranolol (INDERAL LA) 80 MG 24 hr capsule       sildenafil (REVATIO) 20 mg Tab Take 20 mg by mouth 3 (three) times daily.      candesartan (ATACAND) 16 MG tablet Take 1 tablet (16 mg total) by mouth once daily. 30 tablet 3     No current facility-administered medications for this visit.        Review of Systems   Constitutional: Negative for fatigue, fever and unexpected weight change.   HENT: Negative for sore throat and " "trouble swallowing.    Respiratory: Negative for cough and shortness of breath.    Cardiovascular: Negative for chest pain.   Gastrointestinal: Negative for abdominal pain.   Genitourinary:        Impotence    Hematological: Negative for adenopathy. Does not bruise/bleed easily.       Objective:   BP (!) 180/100   Pulse 96   Temp 98.4 °F (36.9 °C) (Tympanic)   Ht 5' 11.5" (1.816 m)   Wt 81.3 kg (179 lb 3.7 oz)   BMI 24.65 kg/m²      Physical Exam   Constitutional: He is oriented to person, place, and time. He appears well-developed and well-nourished. No distress.   HENT:   Head: Normocephalic and atraumatic.   Eyes: EOM are normal. Pupils are equal, round, and reactive to light.   Neck: Normal range of motion. Neck supple.   Cardiovascular: Normal rate and regular rhythm.   Pulmonary/Chest: Effort normal.   Abdominal: Soft.   Musculoskeletal: He exhibits no edema.   Neurological: He is alert and oriented to person, place, and time.   Skin: Capillary refill takes less than 2 seconds.   Psychiatric: He has a normal mood and affect. His behavior is normal.         Lab Results   Component Value Date     10/25/2018    K 4.2 10/25/2018    CL 99 10/25/2018    CREATININE 1.4 10/25/2018    BUN 15 10/25/2018    CO2 29 10/25/2018    HGBA1C 9.2 (H) 10/25/2018       Assessment:       1. Hypertension associated with diabetes    2. Type 1 diabetes mellitus with complication        Plan:   Hypertension associated with diabetes    -     candesartan (ATACAND) 16 MG tablet; Take 1 tablet (16 mg total) by mouth once daily.  Dispense: 30 tablet; Refill: 3    Type 1 diabetes mellitus with complication  Patient seeing DM team   Cont regimen   Discussed diet further     Other orders      Follow-up in about 2 weeks (around 1/3/2019).  "

## 2018-12-21 ENCOUNTER — TELEPHONE (OUTPATIENT)
Dept: INTERNAL MEDICINE | Facility: CLINIC | Age: 48
End: 2018-12-21

## 2018-12-21 NOTE — TELEPHONE ENCOUNTER
----- Message from Sis Hidalgo sent at 12/21/2018 10:48 AM CST -----  Contact: self  Pt is calling in regards to rx that was called in at pharmacy. Pt states medication is too expensive. Pt does not know name of medication. Pt would like to speak with nurse in regards to having old medication called in to [facility] pharmacy. Please call pt back at  [phone]      Pt uses:    [facility] [place] - [address] [facility]  [address] [facility]  [place] [address]  Phone: [phone] Fax: [phone]    Thanks,   Sis Hidalgo

## 2018-12-21 NOTE — TELEPHONE ENCOUNTER
Pt would like revatio sent in to the pharmacy instead of viagra just until he comes in to his follow up appt and get a coupon.

## 2018-12-26 RX ORDER — SILDENAFIL CITRATE 20 MG/1
20 TABLET ORAL 3 TIMES DAILY
Qty: 5 TABLET | Refills: 11 | Status: SHIPPED | OUTPATIENT
Start: 2018-12-26 | End: 2019-11-22 | Stop reason: SDUPTHER

## 2018-12-26 NOTE — TELEPHONE ENCOUNTER
----- Message from Darrell Carreno sent at 12/26/2018  9:37 AM CST -----  Contact: self   Requesting call back regarding that [facility] to  rx. Pt states the 50 count is too expensive and [facility] told him to ask doctor to call pharmacy for a prior authorization to give pt 20 count. Please call back at [phone].      Thanks,  Darrell Carreno

## 2018-12-26 NOTE — TELEPHONE ENCOUNTER
Patient stated he can not fill the 50mg they are too expensive.  Tried prior auth. This was the message:     Message from [facility]: Drug is not covered by plan    Patient is asking that he can afford the 20mg if you can send in the 20mg back in for him. It would be going back to the original script.  Thank you.

## 2018-12-27 ENCOUNTER — NURSE TRIAGE (OUTPATIENT)
Dept: ADMINISTRATIVE | Facility: CLINIC | Age: 48
End: 2018-12-27

## 2018-12-27 NOTE — TELEPHONE ENCOUNTER
"  Reason for Disposition   [1] Fever > 100.5 F (38.1 C) AND [2] diabetes mellitus or weak immune system (e.g., HIV positive, cancer chemo, splenectomy, organ transplant, chronic steroids)    Answer Assessment - Initial Assessment Questions  1. ONSET: "When did the cough begin?"      Cough with greenish whitye phelgm   2. SEVERITY: "How bad is the cough today?"      Not able to sleep with cough   3. RESPIRATORY DISTRESS: "Describe your breathing."      denies   4. FEVER: "Do you have a fever?" If so, ask: "What is your temperature, how was it measured, and when did it start?"     afeb   5. SPUTUM: "Describe the color of your sputum" (clear, white, yellow, green)     As above   6. HEMOPTYSIS: "Are you coughing up any blood?" If so ask: "How much?" (flecks, streaks, tablespoons, etc.)    No   7. CARDIAC HISTORY: "Do you have any history of heart disease?" (e.g., heart attack, congestive heart failure)      No   8. LUNG HISTORY: "Do you have any history of lung disease?"  (e.g., pulmonary embolus, asthma, emphysema)     No   9. PE RISK FACTORS: "Do you have a history of blood clots?" (or: recent major surgery, recent prolonged travel, bedridden )     No   10. OTHER SYMPTOMS: "Do you have any other symptoms?" (e.g., runny nose, wheezing, chest pain)       No   11. PREGNANCY: "Is there any chance you are pregnant?" "When was your last menstrual period?"       N/a   12. TRAVEL: "Have you traveled out of the country in the last month?" (e.g., travel history, exposures)      appt [facility] at 340pm , no travel    Protocols used: ST COUGH - ACUTE [id number]   today - not able to eat a lot past couple days. Pt has cold and sinus pressure. Horrible cough. Pt requesting abx. BS Not checked - feels like its 250-280. No CP, no SOB. Coughs with deep breath.  Offered local . Pt requests to speak with office. Call back with questions.   "

## 2018-12-27 NOTE — TELEPHONE ENCOUNTER
Pt informed me that he is scheduled with provider on tomorrow and is aware of date, time and location.

## 2018-12-28 ENCOUNTER — OFFICE VISIT (OUTPATIENT)
Dept: INTERNAL MEDICINE | Facility: CLINIC | Age: 48
End: 2018-12-28
Payer: COMMERCIAL

## 2018-12-28 VITALS
RESPIRATION RATE: 18 BRPM | HEIGHT: 72 IN | BODY MASS INDEX: 24.28 KG/M2 | HEART RATE: 88 BPM | SYSTOLIC BLOOD PRESSURE: 142 MMHG | TEMPERATURE: 98 F | DIASTOLIC BLOOD PRESSURE: 90 MMHG | WEIGHT: 179.25 LBS

## 2018-12-28 DIAGNOSIS — R05.9 COUGH: ICD-10-CM

## 2018-12-28 DIAGNOSIS — B96.89 BACTERIAL LOWER RESPIRATORY INFECTION: Primary | ICD-10-CM

## 2018-12-28 DIAGNOSIS — E10.65 TYPE 1 DIABETES MELLITUS WITH HYPERGLYCEMIA: ICD-10-CM

## 2018-12-28 DIAGNOSIS — D84.9 IMMUNOCOMPROMISED: ICD-10-CM

## 2018-12-28 DIAGNOSIS — J22 BACTERIAL LOWER RESPIRATORY INFECTION: Primary | ICD-10-CM

## 2018-12-28 PROCEDURE — 3046F HEMOGLOBIN A1C LEVEL >9.0%: CPT | Mod: CPTII,S$GLB,, | Performed by: NURSE PRACTITIONER

## 2018-12-28 PROCEDURE — 99214 OFFICE O/P EST MOD 30 MIN: CPT | Mod: S$GLB,,, | Performed by: NURSE PRACTITIONER

## 2018-12-28 PROCEDURE — 99999 PR PBB SHADOW E&M-EST. PATIENT-LVL IV: CPT | Mod: PBBFAC,,, | Performed by: NURSE PRACTITIONER

## 2018-12-28 PROCEDURE — 3008F BODY MASS INDEX DOCD: CPT | Mod: CPTII,S$GLB,, | Performed by: NURSE PRACTITIONER

## 2018-12-28 RX ORDER — AZITHROMYCIN 250 MG/1
TABLET, FILM COATED ORAL
Qty: 6 TABLET | Refills: 0 | Status: SHIPPED | OUTPATIENT
Start: 2018-12-28 | End: 2019-01-29 | Stop reason: ALTCHOICE

## 2018-12-28 RX ORDER — GUAIFENESIN 600 MG/1
1200 TABLET, EXTENDED RELEASE ORAL 2 TIMES DAILY
Qty: 40 TABLET | Refills: 0 | Status: SHIPPED | OUTPATIENT
Start: 2018-12-28 | End: 2019-01-07

## 2018-12-28 RX ORDER — PROMETHAZINE HYDROCHLORIDE AND DEXTROMETHORPHAN HYDROBROMIDE 6.25; 15 MG/5ML; MG/5ML
5 SYRUP ORAL NIGHTLY PRN
Qty: 120 ML | Refills: 0 | Status: SHIPPED | OUTPATIENT
Start: 2018-12-28 | End: 2019-01-29 | Stop reason: ALTCHOICE

## 2018-12-28 NOTE — PROGRESS NOTES
"Subjective:       Patient ID: Rob Marx is a 48 y.o. male.    Chief Complaint: URI (cough)    URI    This is a new problem. Episode onset: 4 days. The problem has been gradually worsening. There has been no fever. Associated symptoms include coughing and wheezing. Pertinent negatives include no abdominal pain, chest pain, congestion, diarrhea, dysuria, ear pain, headaches, joint pain, joint swelling, nausea, neck pain, rash, rhinorrhea, sinus pain, sneezing, sore throat, swollen glands or vomiting. Treatments tried: nyquil. The treatment provided no relief.       BP (!) 142/90 (BP Location: Left arm, Patient Position: Sitting, BP Method: Medium (Automatic))   Pulse 88   Temp 98.2 °F (36.8 °C) (Tympanic)   Resp 18   Ht 5' 11.5" (1.816 m)   Wt 81.3 kg (179 lb 3.7 oz)   BMI 24.65 kg/m²     Review of Systems   Constitutional: Negative for activity change, appetite change, chills, diaphoresis, fatigue, fever and unexpected weight change.   HENT: Positive for postnasal drip. Negative for congestion, dental problem, drooling, ear discharge, ear pain, facial swelling, hearing loss, mouth sores, nosebleeds, rhinorrhea, sinus pressure, sinus pain, sneezing, sore throat, tinnitus, trouble swallowing and voice change.    Eyes: Negative for photophobia, pain, discharge, redness, itching and visual disturbance.   Respiratory: Positive for cough and wheezing. Negative for apnea, choking, chest tightness, shortness of breath and stridor.    Cardiovascular: Negative for chest pain, palpitations and leg swelling.   Gastrointestinal: Negative for abdominal distention, abdominal pain, anal bleeding, blood in stool, constipation, diarrhea, nausea, rectal pain and vomiting.   Endocrine: Negative.         Dm1 uncontrolled   Genitourinary: Negative for decreased urine volume, difficulty urinating, dysuria, hematuria and urgency.   Musculoskeletal: Negative for arthralgias, gait problem, joint pain, joint swelling, " myalgias, neck pain and neck stiffness.   Skin: Negative for color change, pallor, rash and wound.   Allergic/Immunologic: Positive for immunocompromised state. Negative for environmental allergies and food allergies.   Neurological: Negative for dizziness, tremors, seizures, syncope, facial asymmetry, speech difficulty, weakness, light-headedness, numbness and headaches.   Hematological: Negative for adenopathy. Does not bruise/bleed easily.   Psychiatric/Behavioral: Negative for agitation, behavioral problems, confusion, decreased concentration, dysphoric mood, hallucinations and sleep disturbance. The patient is not nervous/anxious and is not hyperactive.        Objective:      Physical Exam   Constitutional: He is oriented to person, place, and time. He appears well-developed and well-nourished. He is cooperative. No distress.   HENT:   Head: Normocephalic and atraumatic.   Right Ear: Tympanic membrane, external ear and ear canal normal.   Left Ear: Tympanic membrane, external ear and ear canal normal.   Nose: Mucosal edema present. No rhinorrhea. Right sinus exhibits no maxillary sinus tenderness and no frontal sinus tenderness. Left sinus exhibits no maxillary sinus tenderness and no frontal sinus tenderness.   Mouth/Throat: Uvula is midline, oropharynx is clear and moist and mucous membranes are normal. No oropharyngeal exudate, posterior oropharyngeal edema or posterior oropharyngeal erythema.   Eyes: Conjunctivae are normal. Right eye exhibits no discharge. Left eye exhibits no discharge.   Cardiovascular: Normal rate, regular rhythm and normal heart sounds.   No murmur heard.  Pulmonary/Chest: Effort normal. No stridor. No respiratory distress. He has no decreased breath sounds. He has no wheezes. He has no rhonchi. He has no rales. He exhibits no tenderness.   Moist productive cough   Abdominal: Soft. He exhibits no distension.   Musculoskeletal: Normal range of motion.   Neurological: He is alert and  oriented to person, place, and time.   Skin: Skin is warm and dry. No rash noted. He is not diaphoretic.   Psychiatric: He has a normal mood and affect. His behavior is normal. Judgment and thought content normal.   Nursing note and vitals reviewed.      Assessment:       1. Bacterial lower respiratory infection    2. Cough    3. Immunocompromised    4. Type 1 diabetes mellitus with hyperglycemia        Plan:       Rob was seen today for uri.    Diagnoses and all orders for this visit:    Bacterial lower respiratory infection  -     azithromycin (Z-CHERYL) 250 MG tablet; As per packet instructions    Cough  -     promethazine-dextromethorphan (PROMETHAZINE-DM) 6.25-15 mg/5 mL Syrp; Take 5 mLs by mouth nightly as needed (Cough).  -     guaiFENesin (MUCINEX) 600 mg 12 hr tablet; Take 2 tablets (1,200 mg total) by mouth 2 (two) times daily. for 10 days    Immunocompromised    Type 1 diabetes mellitus with hyperglycemia    Rest  Drink plenty of clear fluids  Nasal saline spray to clear nasal drainage and help with nasal congestion  Zyrtec or Claritin to help dry mucus and post nasal drip  Mucinex or Mucinex DM for cough and chest congestion  Tylenol or Ibuprofen for fever, headache and body aches  Warm salt water gargles for throat comfort  Chloraseptic spray or lozenges for throat comfort  See Primary Care Physician or go to ER if symptoms worsen of fail to improve with treatment.

## 2018-12-28 NOTE — PATIENT INSTRUCTIONS
Bronchitis, Antibiotic Treatment (Adult)    Bronchitis is an infection of the air passages (bronchial tubes) in your lungs. It often occurs when you have a cold. This illness is contagious during the first few days and is spread through the air by coughing and sneezing, or by direct contact (touching the sick person and then touching your own eyes, nose, or mouth).  Symptoms of bronchitis include cough with mucus (phlegm) and low-grade fever. Bronchitis usually lasts 7 to 14 days. Mild cases can be treated with simple home remedies. More severe infection is treated with an antibiotic.  Home care  Follow these guidelines when caring for yourself at home:  · If your symptoms are severe, rest at home for the first 2 to 3 days. When you go back to your usual activities, don't let yourself get too tired.  · Do not smoke. Also avoid being exposed to secondhand smoke.  · You may use over-the-counter medicines to control fever or pain, unless another medicine was prescribed. (Note: If you have chronic liver or kidney disease or have ever had a stomach ulcer or gastrointestinal bleeding, talk with your healthcare provider before using these medicines. Also talk to your provider if you are taking medicine to prevent blood clots.) Aspirin should never be given to anyone younger than 18 years of age who is ill with a viral infection or fever. It may cause severe liver or brain damage.  · Your appetite may be poor, so a light diet is fine. Avoid dehydration by drinking 6 to 8 glasses of fluids per day (such as water, soft drinks, sports drinks, juices, tea, or soup). Extra fluids will help loosen secretions in the nose and lungs.  · Over-the-counter cough, cold, and sore-throat medicines will not shorten the length of the illness, but they may be helpful to reduce symptoms. (Note: Do not use decongestants if you have high blood pressure.)  · Finish all antibiotic medicine. Do this even if you are feeling better after only a  few days.  Follow-up care  Follow up with your healthcare provider, or as advised. If you had an X-ray or ECG (electrocardiogram), a specialist will review it. You will be notified of any new findings that may affect your care.  Note: If you are age 65 or older, or if you have a chronic lung disease or condition that affects your immune system, or you smoke, talk to your healthcare provider about having pneumococcal vaccinations and a yearly influenza vaccination (flu shot).  When to seek medical advice  Call your healthcare provider right away if any of these occur:  · Fever of 100.4°F (38°C) or higher  · Coughing up increased amounts of colored sputum  · Weakness, drowsiness, headache, facial pain, ear pain, or a stiff neck  Call 911, or get immediate medical care  Contact emergency services right away if any of these occur.  · Coughing up blood  · Worsening weakness, drowsiness, headache, or stiff neck  · Trouble breathing, wheezing, or pain with breathing  Date Last Reviewed: 9/13/2015  © 2416-2050 [employer]. [address] [place] [address], [place] [address]. All rights reserved. This information is not intended as a substitute for professional medical care. Always follow your healthcare professional's instructions.

## 2019-01-03 RX ORDER — INSULIN LISPRO 100 [IU]/ML
INJECTION, SOLUTION INTRAVENOUS; SUBCUTANEOUS
Qty: 15 ML | Refills: 0 | Status: SHIPPED | OUTPATIENT
Start: 2019-01-03 | End: 2019-01-29

## 2019-01-04 RX ORDER — INSULIN ASPART 100 [IU]/ML
INJECTION, SOLUTION INTRAVENOUS; SUBCUTANEOUS
Qty: 1 BOX | Refills: 0 | Status: SHIPPED | OUTPATIENT
Start: 2019-01-04 | End: 2019-01-29 | Stop reason: SDUPTHER

## 2019-01-25 ENCOUNTER — LAB VISIT (OUTPATIENT)
Dept: LAB | Facility: HOSPITAL | Age: 49
End: 2019-01-25
Attending: NURSE PRACTITIONER
Payer: COMMERCIAL

## 2019-01-25 DIAGNOSIS — E10.8 TYPE 1 DIABETES MELLITUS WITH COMPLICATION: ICD-10-CM

## 2019-01-25 LAB
ALBUMIN SERPL BCP-MCNC: 3.8 G/DL
ALP SERPL-CCNC: 70 U/L
ALT SERPL W/O P-5'-P-CCNC: 23 U/L
ANION GAP SERPL CALC-SCNC: 9 MMOL/L
AST SERPL-CCNC: 24 U/L
BASOPHILS # BLD AUTO: 0.05 K/UL
BASOPHILS NFR BLD: 0.8 %
BILIRUB SERPL-MCNC: 0.8 MG/DL
BUN SERPL-MCNC: 14 MG/DL
C PEPTIDE SERPL-MCNC: <0.08 NG/ML
CALCIUM SERPL-MCNC: 9.8 MG/DL
CHLORIDE SERPL-SCNC: 102 MMOL/L
CHOLEST SERPL-MCNC: 217 MG/DL
CHOLEST/HDLC SERPL: 3.7 {RATIO}
CO2 SERPL-SCNC: 29 MMOL/L
CREAT SERPL-MCNC: 1.2 MG/DL
DIFFERENTIAL METHOD: NORMAL
EOSINOPHIL # BLD AUTO: 0.2 K/UL
EOSINOPHIL NFR BLD: 2.9 %
ERYTHROCYTE [DISTWIDTH] IN BLOOD BY AUTOMATED COUNT: 12.2 %
EST. GFR  (AFRICAN AMERICAN): >60 ML/MIN/1.73 M^2
EST. GFR  (NON AFRICAN AMERICAN): >60 ML/MIN/1.73 M^2
ESTIMATED AVG GLUCOSE: 180 MG/DL
GLUCOSE SERPL-MCNC: 116 MG/DL
HBA1C MFR BLD HPLC: 7.9 %
HCT VFR BLD AUTO: 44.4 %
HDLC SERPL-MCNC: 59 MG/DL
HDLC SERPL: 27.2 %
HGB BLD-MCNC: 14.9 G/DL
IMM GRANULOCYTES # BLD AUTO: 0.01 K/UL
IMM GRANULOCYTES NFR BLD AUTO: 0.2 %
LDLC SERPL CALC-MCNC: 120.8 MG/DL
LYMPHOCYTES # BLD AUTO: 2.1 K/UL
LYMPHOCYTES NFR BLD: 33.8 %
MCH RBC QN AUTO: 30.8 PG
MCHC RBC AUTO-ENTMCNC: 33.6 G/DL
MCV RBC AUTO: 92 FL
MONOCYTES # BLD AUTO: 0.3 K/UL
MONOCYTES NFR BLD: 5.5 %
NEUTROPHILS # BLD AUTO: 3.5 K/UL
NEUTROPHILS NFR BLD: 56.8 %
NONHDLC SERPL-MCNC: 158 MG/DL
NRBC BLD-RTO: 0 /100 WBC
PLATELET # BLD AUTO: 218 K/UL
PMV BLD AUTO: 11.1 FL
POTASSIUM SERPL-SCNC: 4.2 MMOL/L
PROT SERPL-MCNC: 7.4 G/DL
RBC # BLD AUTO: 4.83 M/UL
SODIUM SERPL-SCNC: 140 MMOL/L
TRIGL SERPL-MCNC: 186 MG/DL
TSH SERPL DL<=0.005 MIU/L-ACNC: 1.92 UIU/ML
WBC # BLD AUTO: 6.18 K/UL

## 2019-01-25 PROCEDURE — 84681 ASSAY OF C-PEPTIDE: CPT

## 2019-01-25 PROCEDURE — 83036 HEMOGLOBIN GLYCOSYLATED A1C: CPT

## 2019-01-25 PROCEDURE — 36415 COLL VENOUS BLD VENIPUNCTURE: CPT | Mod: PO

## 2019-01-25 PROCEDURE — 80053 COMPREHEN METABOLIC PANEL: CPT

## 2019-01-25 PROCEDURE — 84443 ASSAY THYROID STIM HORMONE: CPT

## 2019-01-25 PROCEDURE — 86341 ISLET CELL ANTIBODY: CPT

## 2019-01-25 PROCEDURE — 80061 LIPID PANEL: CPT

## 2019-01-25 PROCEDURE — 85025 COMPLETE CBC W/AUTO DIFF WBC: CPT

## 2019-01-28 NOTE — PROGRESS NOTES
"Subjective:         Patient ID: Rob Marx is a 48 y.o. male.  Patient's current PCP is [facility]       Chief Complaint: Diabetes Mellitus    HPI  Rob Marx is a 48 y.o. White male presenting for a follow up for Type I diabetes. Patient has been diagnosed with diabetes since age 19  and has the following complications from diabetes: HTN, Hyperlipidemia. Blood glucose testing is performed regularly. Had problems with meter, therefore not checking as usual.The patient reports medication compliance most of the time and diet noncompliance much of the time. Condition: uncontrolled , improved He denies recent hospital admissions, denies emergency room visits, reports occassional hypoglycemia at night due to working days and night.       Height: 5' 11" (180.3 cm)  //  Weight: 84.3 kg (185 lb 13.6 oz), Body mass index is 25.92 kg/m².  His blood sugar in clinic today is:   Lab Results   Component Value Date    POCGLU 284 (A) 01/29/2019       Labs reviewed and are noted below.    His most recent A1C is:  Lab Results   Component Value Date    HGBA1C 7.9 (H) 01/25/2019    HGBA1C 9.2 (H) 10/25/2018     Lab Results   Component Value Date    CPEPTIDE <0.08 (L) 01/25/2019     No results found for: GLUTAMICACID  Lab Results   Component Value Date    WBC 6.18 01/25/2019    HGB 14.9 01/25/2019    HCT 44.4 01/25/2019     01/25/2019    CHOL 217 (H) 01/25/2019    TRIG 186 (H) 01/25/2019    HDL 59 01/25/2019    ALT 23 01/25/2019    AST 24 01/25/2019     01/25/2019    K 4.2 01/25/2019     01/25/2019    CREATININE 1.2 01/25/2019    BUN 14 01/25/2019    CO2 29 01/25/2019    TSH 1.915 01/25/2019    HGBA1C 7.9 (H) 01/25/2019     CMP  Sodium   Date Value Ref Range Status   01/25/2019 140 136 - 145 mmol/L Final     Potassium   Date Value Ref Range Status   01/25/2019 4.2 3.5 - 5.1 mmol/L Final     Chloride   Date Value Ref Range Status   01/25/2019 102 95 - 110 mmol/L Final     CO2   Date Value " Ref Range Status   01/25/2019 29 23 - 29 mmol/L Final     Glucose   Date Value Ref Range Status   01/25/2019 116 (H) 70 - 110 mg/dL Final     BUN, Bld   Date Value Ref Range Status   01/25/2019 14 6 - 20 mg/dL Final     Creatinine   Date Value Ref Range Status   01/25/2019 1.2 0.5 - 1.4 mg/dL Final     Calcium   Date Value Ref Range Status   01/25/2019 9.8 8.7 - 10.5 mg/dL Final     Total Protein   Date Value Ref Range Status   01/25/2019 7.4 6.0 - 8.4 g/dL Final     Albumin   Date Value Ref Range Status   01/25/2019 3.8 3.5 - 5.2 g/dL Final     Total Bilirubin   Date Value Ref Range Status   01/25/2019 0.8 0.1 - 1.0 mg/dL Final     Comment:     For infants and newborns, interpretation of results should be based  on gestational age, weight and in agreement with clinical  observations.  Premature Infant recommended reference ranges:  Up to 24 hours.............<8.0 mg/dL  Up to 48 hours............<12.0 mg/dL  3-5 days..................<15.0 mg/dL  6-29 days.................<15.0 mg/dL       Alkaline Phosphatase   Date Value Ref Range Status   01/25/2019 70 55 - 135 U/L Final     AST   Date Value Ref Range Status   01/25/2019 24 10 - 40 U/L Final     ALT   Date Value Ref Range Status   01/25/2019 23 10 - 44 U/L Final     Anion Gap   Date Value Ref Range Status   01/25/2019 9 8 - 16 mmol/L Final     eGFR if    Date Value Ref Range Status   01/25/2019 >60.0 >60 mL/min/1.73 m^2 Final     eGFR if non    Date Value Ref Range Status   01/25/2019 >60.0 >60 mL/min/1.73 m^2 Final     Comment:     Calculation used to obtain the estimated glomerular filtration  rate (eGFR) is the CKD-EPI equation.            CURRENT DM MEDICATIONS:   Diabetes Medications             HUMALOG KWIKPEN INSULIN 100 unit/mL InPn pen Inject 12 units in the morning with meals and 20 units in the evening with meals.    insulin degludec (TRESIBA FLEXTOUCH U-200) 200 unit/mL (3 mL) InPn Inject 50 Units into the skin once  daily. (morning)              Health Maintenance   Topic Date Due    Eye Exam  08/19/1982    TETANUS VACCINE  08/19/1990    Pneumococcal Vaccine (Highest Risk) (1 of 3 - PCV13) 08/19/1991    Influenza Vaccine  08/01/2018    Hemoglobin A1c  07/25/2019    Lipid Panel  01/25/2020    Foot Exam  01/29/2020       STANDARDS OF CARE:  Current Ophthalmologist/Optometrist: recommended annually;patient will schedule an appt     Current Dentist: recommended annually  Current Podiatrist: recommended annually  He  is to be enrolled in diabetes education classes.     LIFESTYLE:  ACTIVITY LEVEL: Sedentary  EXERCISE: none  MEAL PLANNING: Patient reports number of meals per day to be 1 and number of snacks per day to be 2  BLOOD GLUCOSE TESTING: Patient is testing 2-3 times per day     Diabetes Management Status    Statin: Taking  ACE/ARB: Taking    Screening or Prevention Patient's value Goal Complete/Controlled?   HgA1C Testing and Control   Lab Results   Component Value Date    HGBA1C 7.9 (H) 01/25/2019      Annually/Less than 8% No   Lipid profile : 01/25/2019 Annually No   LDL control Lab Results   Component Value Date    LDLCALC 120.8 01/25/2019    Annually/Less than 100 mg/dl  No   Nephropathy screening Lab Results   Component Value Date    LABMICR 330.0 01/25/2019     No results found for: PROTEINUA Annually No   Blood pressure BP Readings from Last 1 Encounters:   01/29/19 132/88    Less than 140/90 Yes   Dilated retinal exam Most Recent Eye Exam Date: Not Found Annually No   Foot exam   : 01/29/2019 Annually Yes     Review of Systems   Constitutional: Negative for activity change and appetite change.   Eyes: Negative for visual disturbance.   Gastrointestinal: Negative for constipation and diarrhea.   Endocrine: Negative for polydipsia, polyphagia and polyuria.   Neurological: Negative for syncope and weakness.   Psychiatric/Behavioral: Negative for confusion.         Objective:      Physical Exam   Constitutional:  He is oriented to person, place, and time. He appears well-developed and well-nourished. No distress.   Neck: Normal range of motion.   Cardiovascular: Normal rate.   Pulmonary/Chest: Effort normal.   Musculoskeletal: Normal range of motion.   Neurological: He is alert and oriented to person, place, and time.   Skin: Skin is warm and dry. He is not diaphoretic.   Psychiatric: He has a normal mood and affect. His behavior is normal. Judgment and thought content normal.       Assessment:       1. Type 1 diabetes mellitus with complication        Plan:   Type 1 diabetes mellitus with complication  -     POCT Glucose, Hand-Held Device  -     blood sugar diagnostic Strp; 1 each by Misc.(Non-Drug; Combo Route) route 4 (four) times daily. Insurance preferred  Dispense: 100 strip; Refill: 11  -     blood-glucose meter kit; Use as instructed. Insurance preferred  Dispense: 1 each; Refill: 0  -     lancets Misc; 1 each by Misc.(Non-Drug; Combo Route) route 4 (four) times daily. Insurance preferred  Dispense: 100 each; Refill: 11  -     insulin aspart U-100 (NOVOLOG FLEXPEN U-100 INSULIN) 100 unit/mL InPn pen; Inject 12 units in the morning with meals and 20 units in the evening with meals.  Dispense: 15 mL; Refill: 2  -     insulin degludec (TRESIBA FLEXTOUCH U-200) 200 unit/mL (3 mL) InPn; Inject 50 Units into the skin once daily.  Dispense: 9 mL; Refill: 2    Other orders  -     Cancel: Hemoglobin A1c; Future; Expected date: 04/28/2019        Additional Plan Details:    1.) Patient was instructed to monitor blood glucose 4-5x daily, fasting and ac dinner or at bedtime. Dexcom too costly. Discussed ADA goal for fasting blood sugar, 80 - 130mg/dL; pp blood sugars below 180 mg/dl. Also, discussed prevention of hypoglycemia and the need to adjust goals to higher levels if persistent hypoglycemia.  Reminded to bring BG records or meter to each visit for review. Patient instructed to send in log weekly for review and potential  medication adjustments.  2.) Reviewed pathophysiology of Type 1 diabetes, complications related to the disease, importance of annual dilated eye exam and daily foot examination.  3.) We discussed the ADA recommendations, which are as follows:  Hemoglobin A1c below 7.0 %. All patients with diabetes should be on statins unless contraindicated.  ACE or ARB therapy if not contraindicated.    4.) Continue medications as prescribed.  [facility] e-mail or phone review in one week with BG records for adjustment of medication.  5.) Meal planning teaching: Reviewed carb counting, portion control, importance of spacing meals throughout the day to prevent post prandial elevations.  6.) Discussed activity with related benefits, methods, and precautions. Recommended patient start or continue some form of exercise and increase as tolerated to 30 minutes per day to aid in control of BGs.  7.) A1C, TSH, Lipid Panel, CMP with eGFR and Micro/Creatinine are utd or were ordered per ADA protocol. Labs results/concerns/plan reviewed with patient   8.) Return to clinic in 3 months for follow up. The patient was explained the above plan and given opportunity to ask questions.  He understands, chooses and consents to this plan and accepts all the risks, which include but are not limited to the risks mentioned above. He understands the alternative of having no testing, interventions or treatments at this time. He left content and without further questions.     A total of 30 minutes was spent in face to face time, of which over 50% was spent in counseling patient on disease process, complications, treatment, and side effects of medications.

## 2019-01-29 ENCOUNTER — OFFICE VISIT (OUTPATIENT)
Dept: DIABETES | Facility: CLINIC | Age: 49
End: 2019-01-29
Payer: COMMERCIAL

## 2019-01-29 VITALS
DIASTOLIC BLOOD PRESSURE: 88 MMHG | WEIGHT: 185.88 LBS | HEIGHT: 71 IN | BODY MASS INDEX: 26.02 KG/M2 | SYSTOLIC BLOOD PRESSURE: 132 MMHG

## 2019-01-29 DIAGNOSIS — E10.8 TYPE 1 DIABETES MELLITUS WITH COMPLICATION: Primary | ICD-10-CM

## 2019-01-29 DIAGNOSIS — R80.9 MICROALBUMINURIA: ICD-10-CM

## 2019-01-29 LAB — GLUCOSE SERPL-MCNC: 284 MG/DL (ref 70–110)

## 2019-01-29 PROCEDURE — 3045F PR MOST RECENT HEMOGLOBIN A1C LEVEL 7.0-9.0%: ICD-10-PCS | Mod: CPTII,S$GLB,, | Performed by: NURSE PRACTITIONER

## 2019-01-29 PROCEDURE — 3008F BODY MASS INDEX DOCD: CPT | Mod: CPTII,S$GLB,, | Performed by: NURSE PRACTITIONER

## 2019-01-29 PROCEDURE — 82962 GLUCOSE BLOOD TEST: CPT | Mod: S$GLB,,, | Performed by: NURSE PRACTITIONER

## 2019-01-29 PROCEDURE — 3045F PR MOST RECENT HEMOGLOBIN A1C LEVEL 7.0-9.0%: CPT | Mod: CPTII,S$GLB,, | Performed by: NURSE PRACTITIONER

## 2019-01-29 PROCEDURE — 99214 OFFICE O/P EST MOD 30 MIN: CPT | Mod: S$GLB,,, | Performed by: NURSE PRACTITIONER

## 2019-01-29 PROCEDURE — 99999 PR PBB SHADOW E&M-EST. PATIENT-LVL III: CPT | Mod: PBBFAC,,, | Performed by: NURSE PRACTITIONER

## 2019-01-29 PROCEDURE — 3079F PR MOST RECENT DIASTOLIC BLOOD PRESSURE 80-89 MM HG: ICD-10-PCS | Mod: CPTII,S$GLB,, | Performed by: NURSE PRACTITIONER

## 2019-01-29 PROCEDURE — 3075F SYST BP GE 130 - 139MM HG: CPT | Mod: CPTII,S$GLB,, | Performed by: NURSE PRACTITIONER

## 2019-01-29 PROCEDURE — 3079F DIAST BP 80-89 MM HG: CPT | Mod: CPTII,S$GLB,, | Performed by: NURSE PRACTITIONER

## 2019-01-29 PROCEDURE — 82962 POCT GLUCOSE, HAND-HELD DEVICE: ICD-10-PCS | Mod: S$GLB,,, | Performed by: NURSE PRACTITIONER

## 2019-01-29 PROCEDURE — 99999 PR PBB SHADOW E&M-EST. PATIENT-LVL III: ICD-10-PCS | Mod: PBBFAC,,, | Performed by: NURSE PRACTITIONER

## 2019-01-29 PROCEDURE — 99214 PR OFFICE/OUTPT VISIT, EST, LEVL IV, 30-39 MIN: ICD-10-PCS | Mod: S$GLB,,, | Performed by: NURSE PRACTITIONER

## 2019-01-29 PROCEDURE — 3075F PR MOST RECENT SYSTOLIC BLOOD PRESS GE 130-139MM HG: ICD-10-PCS | Mod: CPTII,S$GLB,, | Performed by: NURSE PRACTITIONER

## 2019-01-29 PROCEDURE — 3008F PR BODY MASS INDEX (BMI) DOCUMENTED: ICD-10-PCS | Mod: CPTII,S$GLB,, | Performed by: NURSE PRACTITIONER

## 2019-01-29 RX ORDER — INSULIN ASPART 100 [IU]/ML
INJECTION, SOLUTION INTRAVENOUS; SUBCUTANEOUS
Qty: 15 ML | Refills: 2 | Status: SHIPPED | OUTPATIENT
Start: 2019-01-29 | End: 2019-08-26 | Stop reason: SDUPTHER

## 2019-01-29 RX ORDER — INSULIN DEGLUDEC 200 U/ML
50 INJECTION, SOLUTION SUBCUTANEOUS DAILY
Qty: 9 ML | Refills: 2 | Status: SHIPPED | OUTPATIENT
Start: 2019-01-29 | End: 2019-02-28

## 2019-01-29 RX ORDER — LANCETS
1 EACH MISCELLANEOUS 4 TIMES DAILY
Qty: 100 EACH | Refills: 11 | Status: SHIPPED | OUTPATIENT
Start: 2019-01-29 | End: 2023-05-10

## 2019-01-29 RX ORDER — INSULIN PUMP SYRINGE, 3 ML
EACH MISCELLANEOUS
Qty: 1 EACH | Refills: 0 | Status: SHIPPED | OUTPATIENT
Start: 2019-01-29 | End: 2023-05-10

## 2019-01-30 LAB — GAD65 AB SER-SCNC: 0 NMOL/L

## 2019-02-08 ENCOUNTER — OFFICE VISIT (OUTPATIENT)
Dept: URGENT CARE | Facility: CLINIC | Age: 49
End: 2019-02-08
Payer: COMMERCIAL

## 2019-02-08 VITALS
TEMPERATURE: 99 F | WEIGHT: 180.75 LBS | DIASTOLIC BLOOD PRESSURE: 90 MMHG | SYSTOLIC BLOOD PRESSURE: 134 MMHG | HEART RATE: 95 BPM | RESPIRATION RATE: 20 BRPM | OXYGEN SATURATION: 95 % | BODY MASS INDEX: 25.21 KG/M2

## 2019-02-08 DIAGNOSIS — K52.9 GASTROENTERITIS: ICD-10-CM

## 2019-02-08 DIAGNOSIS — R73.9 HYPERGLYCEMIA: Primary | ICD-10-CM

## 2019-02-08 DIAGNOSIS — R19.7 DIARRHEA, UNSPECIFIED TYPE: ICD-10-CM

## 2019-02-08 LAB — GLUCOSE SERPL-MCNC: 141 MG/DL (ref 70–110)

## 2019-02-08 PROCEDURE — 3075F SYST BP GE 130 - 139MM HG: CPT | Mod: CPTII,S$GLB,, | Performed by: NURSE PRACTITIONER

## 2019-02-08 PROCEDURE — 3080F PR MOST RECENT DIASTOLIC BLOOD PRESSURE >= 90 MM HG: ICD-10-PCS | Mod: CPTII,S$GLB,, | Performed by: NURSE PRACTITIONER

## 2019-02-08 PROCEDURE — 3008F BODY MASS INDEX DOCD: CPT | Mod: CPTII,S$GLB,, | Performed by: NURSE PRACTITIONER

## 2019-02-08 PROCEDURE — 3075F PR MOST RECENT SYSTOLIC BLOOD PRESS GE 130-139MM HG: ICD-10-PCS | Mod: CPTII,S$GLB,, | Performed by: NURSE PRACTITIONER

## 2019-02-08 PROCEDURE — 3080F DIAST BP >= 90 MM HG: CPT | Mod: CPTII,S$GLB,, | Performed by: NURSE PRACTITIONER

## 2019-02-08 PROCEDURE — 99999 PR PBB SHADOW E&M-EST. PATIENT-LVL IV: ICD-10-PCS | Mod: PBBFAC,,, | Performed by: NURSE PRACTITIONER

## 2019-02-08 PROCEDURE — 82962 POCT GLUCOSE, HAND-HELD DEVICE: ICD-10-PCS | Mod: S$GLB,,, | Performed by: NURSE PRACTITIONER

## 2019-02-08 PROCEDURE — 3008F PR BODY MASS INDEX (BMI) DOCUMENTED: ICD-10-PCS | Mod: CPTII,S$GLB,, | Performed by: NURSE PRACTITIONER

## 2019-02-08 PROCEDURE — 99214 OFFICE O/P EST MOD 30 MIN: CPT | Mod: S$GLB,,, | Performed by: NURSE PRACTITIONER

## 2019-02-08 PROCEDURE — 82962 GLUCOSE BLOOD TEST: CPT | Mod: S$GLB,,, | Performed by: NURSE PRACTITIONER

## 2019-02-08 PROCEDURE — 99214 PR OFFICE/OUTPT VISIT, EST, LEVL IV, 30-39 MIN: ICD-10-PCS | Mod: S$GLB,,, | Performed by: NURSE PRACTITIONER

## 2019-02-08 PROCEDURE — 99999 PR PBB SHADOW E&M-EST. PATIENT-LVL IV: CPT | Mod: PBBFAC,,, | Performed by: NURSE PRACTITIONER

## 2019-02-08 NOTE — PATIENT INSTRUCTIONS
Noninfectious Gastroenteritis (Ages 6 Years to Adult)    Gastroenteritis can cause nausea, vomiting, diarrhea, and abdominal cramping. This may occur as a result of food sensitivity, inflammation of your gastrointestinal tract, medicines, stress, or other causes not related to infection. Your symptoms will usually last from 1 to 3 days, but can last longer. Antibiotics are not effective, but simple home treatment will be helpful.  Home care  Medicine  · You may use acetaminophen or NSAID medicines like ibuprofen or naproxen to control fever, unless another medicine is prescribed. (Note: If you have chronic liver or kidney disease, or ever had a stomach ulcer or gastrointestinalI bleeding, talk with your healthcare provider before using these medicines.) Aspirin should never be used in anyone under 18 years of age who is ill with a fever. It may cause severe liver damage. Don't increase your NSAID medicines if you are already taking these medicines for another condition (like arthritis). Don't use NSAIDS if you are on aspirin (such as for heart disease, or after a stroke).  · If medicines for diarrhea or vomiting are prescribed, take only as directed.  General care and preventing spread of the illness  · If symptoms are severe, rest at home for the next 24 hours or until you feel better.  · Hand washing with soap and water is the best way to prevent the spread of infection. Wash your hands after touching anyone who is sick.  · Wash your hands after using the toilet and before meals. Clean the toilet after each use.  · Caffeine, tobacco, and alcohol can make your diarrhea, cramping, and pain worse.  Diet  · Water and clear liquids are important so you do not get dehydrated. Drink a small amount at a time.  · Do not force yourself to eat, especially if you have cramps, vomiting, or diarrhea. When you finally decide to start eating, do not eat large amounts at a time, even if you are hungry.  · If you eat, avoid  fatty, greasy, spicy, or fried foods.  · Do not eat dairy products if you have diarrhea; they can make the diarrhea worse.  During the first 24 hours (the first full day), follow the diet below:  · Beverages: Water, clear liquids, soft drinks without caffeine, like ginger ale; mineral water (plain or flavored); decaffeinated tea and coffee.  · Soups: Clear broth, consommé, and bouillon Sports drinks aren't a good choice because they have too much sugar and not enough electrolytes. In this case, commercially available products called oral rehydration solutions are best.  · Desserts: Plain gelatin, popsicles, and fruit juice bars.  During the next 24 hours (the second day), you may add the following to the above if you have improved. If not, continue what you did the first day:  · Hot cereal, plain toast, bread, rolls, crackers  · Plain noodles, rice, mashed potatoes, chicken noodle or rice soup  · Unsweetened canned fruit (avoid pineapple), bananas  · Limit caffeine and chocolate. No spices or seasonings except salt.  During the next 24 hours  · Gradually resume a normal diet, as you feel better and your symptoms improve.  · If at any time your symptoms start getting worse, go back to clear liquids until you feel better.  Food preparation  · If you have diarrhea, you should not prepare food for others. When you  prepare food for yourself, wash your hands before and after.  · Wash your hands after using cutting boards, countertops, and knives that have been in contact with raw food.  · Keep uncooked meats away from cooked and ready-to-eat foods.  Follow-up care  Follow up with your healthcare provider if you are not improving over the next 2 to 3 days, or as advised. If a stool (diarrhea) sample was taken, call for the results as directed.  When to seek medical care  Call your healthcare provider right away if any of these occur:   · Increasing abdominal pain or constant lower right abdominal pain  · Continued  vomiting (unable to keep liquids down)  · Frequent diarrhea (more than 5 times a day)  · Blood in vomit or stool (black or red color)  · Inability to tolerate solid food after a few days.  · Dark urine, reduced urine output  · Weakness, dizziness  · Drowsiness  · Fever of 100.4ºF (38.0ºC) or higher, or as directed by your healthcare provider  · New rash  Call 911  Call 911 if any of these occur:  · Trouble breathing  · Chest pain  · Confusion  · Severe drowsiness or trouble awakening  · Seizure  · Stiff neck  Date Last Reviewed: 11/16/2015  © 9434-4178 [employer]. [address] [place] [address], [place] [address]. All rights reserved. This information is not intended as a substitute for professional medical care. Always follow your healthcare professional's instructions.

## 2019-02-08 NOTE — PROGRESS NOTES
Subjective:       Patient ID: Rob Marx is a 48 y.o. male.    Chief Complaint: Diarrhea (started at 1am)    48 year old presents to Urgent Care with reports of diarrhea and nausea that has been present for about 2 days. Denies any other problems or concerns at this time.       Diarrhea    This is a new problem. The current episode started yesterday. The problem occurs 5 to 10 times per day. The problem has been unchanged. The stool consistency is described as watery. The patient states that diarrhea does not awaken him from sleep. Pertinent negatives include no abdominal pain, chills, fever or vomiting. Nothing aggravates the symptoms. He has tried nothing for the symptoms.     Review of Systems   Constitutional: Negative for appetite change, chills and fever.   HENT: Negative for ear pain, sinus pressure, sore throat and trouble swallowing.    Eyes: Negative for visual disturbance.   Respiratory: Negative for shortness of breath.    Cardiovascular: Negative for chest pain.   Gastrointestinal: Positive for diarrhea. Negative for abdominal pain, nausea and vomiting.   Endocrine: Negative for cold intolerance, polyphagia and polyuria.   Genitourinary: Negative for decreased urine volume and dysuria.   Musculoskeletal: Negative for back pain.   Skin: Negative for rash.   Allergic/Immunologic: Negative for environmental allergies and food allergies.   Neurological: Negative for dizziness, tremors, weakness and numbness.   Hematological: Does not bruise/bleed easily.   Psychiatric/Behavioral: Negative for confusion and hallucinations. The patient is not nervous/anxious and is not hyperactive.    All other systems reviewed and are negative.      Objective:     Physical Exam   Constitutional: He is oriented to person, place, and time. He appears well-developed and well-nourished.   HENT:   Head: Normocephalic.   Right Ear: External ear normal.   Left Ear: External ear normal.   Nose: Nose normal.   Mouth/Throat:  Oropharynx is clear and moist.   Eyes: Conjunctivae and EOM are normal. Pupils are equal, round, and reactive to light.   Neck: Normal range of motion. Neck supple. No JVD present.   Cardiovascular: Normal rate, regular rhythm, normal heart sounds and intact distal pulses.   Pulmonary/Chest: Effort normal and breath sounds normal. He has no wheezes.   Abdominal: Soft. Bowel sounds are normal. There is generalized tenderness.   Lymphadenopathy:     He has no cervical adenopathy.   Neurological: He is alert and oriented to person, place, and time.   Skin: Skin is warm and dry.   Psychiatric: He has a normal mood and affect. His behavior is normal. Judgment and thought content normal.   Nursing note and vitals reviewed.    Assessment:     1. Hyperglycemia    2. Gastroenteritis    3. Diarrhea, unspecified type      Plan:   Rob was seen today for diarrhea.    Diagnoses and all orders for this visit:    Hyperglycemia  -     POCT Glucose, Hand-Held Device    Gastroenteritis    Diarrhea, unspecified type

## 2019-02-08 NOTE — LETTER
February 8, 2019      [facility]  [address] [place] [address]  Phone: [phone]  Fax: [phone]       Patient: Rob Marx   YOB: 1970  Date of Visit: 02/08/2019    To Whom It May Concern:    Ilene Marx  was at [facility] on 02/08/2019. He may return to work/school on 02/09/2019 with no restrictions. If you have any questions or concerns, or if I can be of further assistance, please do not hesitate to contact me.    Sincerely,      RICARDO Pretty

## 2019-03-18 RX ORDER — ESOMEPRAZOLE MAGNESIUM 40 MG/1
CAPSULE, DELAYED RELEASE ORAL
Qty: 30 CAPSULE | Refills: 2 | Status: SHIPPED | OUTPATIENT
Start: 2019-03-18 | End: 2019-06-28 | Stop reason: SDUPTHER

## 2019-03-26 ENCOUNTER — HOSPITAL ENCOUNTER (OUTPATIENT)
Dept: RADIOLOGY | Facility: HOSPITAL | Age: 49
Discharge: HOME | End: 2019-03-26
Attending: PHYSICIAN ASSISTANT
Payer: COMMERCIAL

## 2019-03-26 ENCOUNTER — OFFICE VISIT (OUTPATIENT)
Dept: INTERNAL MEDICINE | Facility: CLINIC | Age: 49
End: 2019-03-26
Payer: COMMERCIAL

## 2019-03-26 VITALS
HEART RATE: 92 BPM | DIASTOLIC BLOOD PRESSURE: 88 MMHG | TEMPERATURE: 99 F | WEIGHT: 184.94 LBS | SYSTOLIC BLOOD PRESSURE: 152 MMHG | HEIGHT: 71 IN | BODY MASS INDEX: 25.89 KG/M2

## 2019-03-26 DIAGNOSIS — R60.0 LOCAL EDEMA: ICD-10-CM

## 2019-03-26 DIAGNOSIS — M89.8X6 TIBIAL PAIN: ICD-10-CM

## 2019-03-26 DIAGNOSIS — M89.8X6 TIBIAL PAIN: Primary | ICD-10-CM

## 2019-03-26 PROCEDURE — 73590 XR TIBIA FIBULA 2 VIEW RIGHT: ICD-10-PCS | Mod: 26,RT,, | Performed by: RADIOLOGY

## 2019-03-26 PROCEDURE — 99999 PR PBB SHADOW E&M-EST. PATIENT-LVL III: ICD-10-PCS | Mod: PBBFAC,,, | Performed by: PHYSICIAN ASSISTANT

## 2019-03-26 PROCEDURE — 99999 PR PBB SHADOW E&M-EST. PATIENT-LVL III: CPT | Mod: PBBFAC,,, | Performed by: PHYSICIAN ASSISTANT

## 2019-03-26 PROCEDURE — 3077F SYST BP >= 140 MM HG: CPT | Mod: CPTII,S$GLB,, | Performed by: PHYSICIAN ASSISTANT

## 2019-03-26 PROCEDURE — 3008F BODY MASS INDEX DOCD: CPT | Mod: CPTII,S$GLB,, | Performed by: PHYSICIAN ASSISTANT

## 2019-03-26 PROCEDURE — 99213 PR OFFICE/OUTPT VISIT, EST, LEVL III, 20-29 MIN: ICD-10-PCS | Mod: S$GLB,,, | Performed by: PHYSICIAN ASSISTANT

## 2019-03-26 PROCEDURE — 99213 OFFICE O/P EST LOW 20 MIN: CPT | Mod: S$GLB,,, | Performed by: PHYSICIAN ASSISTANT

## 2019-03-26 PROCEDURE — 73590 X-RAY EXAM OF LOWER LEG: CPT | Mod: TC,FY,PO,RT

## 2019-03-26 PROCEDURE — 73590 X-RAY EXAM OF LOWER LEG: CPT | Mod: 26,RT,, | Performed by: RADIOLOGY

## 2019-03-26 PROCEDURE — 3079F DIAST BP 80-89 MM HG: CPT | Mod: CPTII,S$GLB,, | Performed by: PHYSICIAN ASSISTANT

## 2019-03-26 PROCEDURE — 3008F PR BODY MASS INDEX (BMI) DOCUMENTED: ICD-10-PCS | Mod: CPTII,S$GLB,, | Performed by: PHYSICIAN ASSISTANT

## 2019-03-26 PROCEDURE — 3077F PR MOST RECENT SYSTOLIC BLOOD PRESSURE >= 140 MM HG: ICD-10-PCS | Mod: CPTII,S$GLB,, | Performed by: PHYSICIAN ASSISTANT

## 2019-03-26 PROCEDURE — 3079F PR MOST RECENT DIASTOLIC BLOOD PRESSURE 80-89 MM HG: ICD-10-PCS | Mod: CPTII,S$GLB,, | Performed by: PHYSICIAN ASSISTANT

## 2019-03-26 RX ORDER — INSULIN DEGLUDEC 200 U/ML
INJECTION, SOLUTION SUBCUTANEOUS
Refills: 2 | COMMUNITY
Start: 2019-03-18 | End: 2019-08-26 | Stop reason: SDUPTHER

## 2019-03-26 RX ORDER — NAPROXEN 375 MG/1
375 TABLET ORAL 2 TIMES DAILY WITH MEALS
Qty: 10 TABLET | Refills: 0 | Status: SHIPPED | OUTPATIENT
Start: 2019-03-26 | End: 2019-03-31

## 2019-03-26 RX ORDER — LISINOPRIL 10 MG/1
TABLET ORAL
COMMUNITY
Start: 2019-01-19 | End: 2019-10-18 | Stop reason: ALTCHOICE

## 2019-03-26 NOTE — PROGRESS NOTES
"Subjective:       Patient ID: Rob Marx is a 48 y.o. male.    Chief Complaint:   HPI    Health Maintenance Due   Topic Date Due    Eye Exam  08/19/1982    TETANUS VACCINE  08/19/1990    Pneumococcal Vaccine (Highest Risk) (1 of 3 - PCV13) 08/19/1991    Influenza Vaccine  08/01/2018       Past Medical History:   Diagnosis Date    Diabetes mellitus type I     Hypertension        Current Outpatient Medications   Medication Sig Dispense Refill    atorvastatin (LIPITOR) 40 MG tablet Take 40 mg by mouth once daily.      blood sugar diagnostic Strp 1 each by Misc.(Non-Drug; Combo Route) route 4 (four) times daily. Insurance preferred 100 strip 11    blood-glucose meter kit Use as instructed. Insurance preferred 1 each 0    candesartan (ATACAND) 16 MG tablet Take 1 tablet (16 mg total) by mouth once daily. 30 tablet 3    esomeprazole (NEXIUM) 40 MG capsule TAKE 1 CAPSULE BY MOUTH ONCE DAILY BEFORE BREAKFAST 30 capsule 2    insulin aspart U-100 (NOVOLOG FLEXPEN U-100 INSULIN) 100 unit/mL InPn pen Inject 12 units in the morning with meals and 20 units in the evening with meals. 15 mL 2    lancets Misc 1 each by Misc.(Non-Drug; Combo Route) route 4 (four) times daily. Insurance preferred 100 each 11    lisinopril 10 MG tablet       pen needle, diabetic 32 gauge x 5/32" Ndle 1 each by Misc.(Non-Drug; Combo Route) route once daily. 100 each 11    propranolol (INDERAL LA) 80 MG 24 hr capsule       sildenafil (REVATIO) 20 mg Tab Take 1 tablet (20 mg total) by mouth 3 (three) times daily. 5 tablet 11    TRESIBA FLEXTOUCH U-200 200 unit/mL (3 mL) InPn   2     No current facility-administered medications for this visit.        Review of Systems    Objective:   BP (!) 152/88   Pulse 92   Temp 99.1 °F (37.3 °C) (Oral)   Ht 5' 11" (1.803 m)   Wt 83.9 kg (184 lb 15.5 oz)   BMI 25.80 kg/m²      Physical Exam   Constitutional: He is oriented to person, place, and time. He appears well-developed and " well-nourished. No distress.   HENT:   Head: Normocephalic and atraumatic.   Eyes: Pupils are equal, round, and reactive to light. EOM are normal.   Neck: Normal range of motion. Neck supple.   Cardiovascular: Normal rate and regular rhythm.   Pulmonary/Chest: Effort normal.   Abdominal: Soft.   Musculoskeletal: He exhibits no edema.        Feet:    Neurological: He is alert and oriented to person, place, and time.   Skin: Capillary refill takes less than 2 seconds.   Psychiatric: He has a normal mood and affect. His behavior is normal.         Lab Results   Component Value Date    WBC 6.18 01/25/2019    HGB 14.9 01/25/2019    HCT 44.4 01/25/2019     01/25/2019    CHOL 217 (H) 01/25/2019    TRIG 186 (H) 01/25/2019    HDL 59 01/25/2019    ALT 23 01/25/2019    AST 24 01/25/2019     01/25/2019    K 4.2 01/25/2019     01/25/2019    CREATININE 1.2 01/25/2019    BUN 14 01/25/2019    CO2 29 01/25/2019    TSH 1.915 01/25/2019    HGBA1C 7.9 (H) 01/25/2019       Assessment:       1. Tibial pain    2. Local edema        Plan:   Tibial pain  -     X-Ray Tibia Fibula 2 View Right; Future; Expected date: 03/26/2019    Local edema    xray clear  Unsure of dx at this point   Seems like local injury   Start NSAIDs, ice, elevate     ? If will need to follow up with MRI

## 2019-03-28 ENCOUNTER — TELEPHONE (OUTPATIENT)
Dept: INTERNAL MEDICINE | Facility: CLINIC | Age: 49
End: 2019-03-28

## 2019-03-28 NOTE — TELEPHONE ENCOUNTER
----- Message from Kathleen Castro sent at 3/28/2019 10:22 AM CDT -----  Contact: Patient  Type:  Test Results    Who Called: Patient  Name of Test (Lab/Mammo/Etc): Lab  Date of Test: 3/26/19  Ordering Provider: Sherie Tovar  Where the test was performed: [facility]  Would the patient rather a call back or a response via [facility]? call  Best Call Back Number: [phone]  Additional Information:

## 2019-03-28 NOTE — TELEPHONE ENCOUNTER
Pt is scheduled to see Podiatry on tomorrow at the [place] location. He is aware of date, time and location.

## 2019-03-28 NOTE — TELEPHONE ENCOUNTER
Informed pt of lab results. Per pt the medication is not helping at all. Pt stated that his foot is swollen and hurts really bad.

## 2019-04-05 ENCOUNTER — LAB VISIT (OUTPATIENT)
Dept: LAB | Facility: HOSPITAL | Age: 49
End: 2019-04-05
Attending: PODIATRIST
Payer: COMMERCIAL

## 2019-04-05 ENCOUNTER — OFFICE VISIT (OUTPATIENT)
Dept: PODIATRY | Facility: CLINIC | Age: 49
End: 2019-04-05
Payer: COMMERCIAL

## 2019-04-05 VITALS
WEIGHT: 191.81 LBS | HEART RATE: 109 BPM | BODY MASS INDEX: 26.75 KG/M2 | SYSTOLIC BLOOD PRESSURE: 158 MMHG | DIASTOLIC BLOOD PRESSURE: 106 MMHG

## 2019-04-05 DIAGNOSIS — M10.471 OTHER SECONDARY ACUTE GOUT OF RIGHT FOOT: ICD-10-CM

## 2019-04-05 DIAGNOSIS — M10.471 OTHER SECONDARY ACUTE GOUT OF RIGHT FOOT: Primary | ICD-10-CM

## 2019-04-05 LAB
CRP SERPL-MCNC: 3 MG/L (ref 0–8.2)
ERYTHROCYTE [SEDIMENTATION RATE] IN BLOOD BY WESTERGREN METHOD: <2 MM/HR (ref 0–23)
URATE SERPL-MCNC: 6.1 MG/DL (ref 3.4–7)

## 2019-04-05 PROCEDURE — 3077F SYST BP >= 140 MM HG: CPT | Mod: CPTII,S$GLB,, | Performed by: PODIATRIST

## 2019-04-05 PROCEDURE — 86140 C-REACTIVE PROTEIN: CPT

## 2019-04-05 PROCEDURE — 3008F BODY MASS INDEX DOCD: CPT | Mod: CPTII,S$GLB,, | Performed by: PODIATRIST

## 2019-04-05 PROCEDURE — 36415 COLL VENOUS BLD VENIPUNCTURE: CPT

## 2019-04-05 PROCEDURE — 3080F PR MOST RECENT DIASTOLIC BLOOD PRESSURE >= 90 MM HG: ICD-10-PCS | Mod: CPTII,S$GLB,, | Performed by: PODIATRIST

## 2019-04-05 PROCEDURE — 84550 ASSAY OF BLOOD/URIC ACID: CPT

## 2019-04-05 PROCEDURE — 3077F PR MOST RECENT SYSTOLIC BLOOD PRESSURE >= 140 MM HG: ICD-10-PCS | Mod: CPTII,S$GLB,, | Performed by: PODIATRIST

## 2019-04-05 PROCEDURE — 99999 PR PBB SHADOW E&M-EST. PATIENT-LVL III: ICD-10-PCS | Mod: PBBFAC,,, | Performed by: PODIATRIST

## 2019-04-05 PROCEDURE — 3080F DIAST BP >= 90 MM HG: CPT | Mod: CPTII,S$GLB,, | Performed by: PODIATRIST

## 2019-04-05 PROCEDURE — 99204 OFFICE O/P NEW MOD 45 MIN: CPT | Mod: S$GLB,,, | Performed by: PODIATRIST

## 2019-04-05 PROCEDURE — 99999 PR PBB SHADOW E&M-EST. PATIENT-LVL III: CPT | Mod: PBBFAC,,, | Performed by: PODIATRIST

## 2019-04-05 PROCEDURE — 3008F PR BODY MASS INDEX (BMI) DOCUMENTED: ICD-10-PCS | Mod: CPTII,S$GLB,, | Performed by: PODIATRIST

## 2019-04-05 PROCEDURE — 85652 RBC SED RATE AUTOMATED: CPT

## 2019-04-05 PROCEDURE — 99204 PR OFFICE/OUTPT VISIT, NEW, LEVL IV, 45-59 MIN: ICD-10-PCS | Mod: S$GLB,,, | Performed by: PODIATRIST

## 2019-04-05 RX ORDER — COLCHICINE 0.6 MG/1
0.6 TABLET ORAL DAILY
Qty: 6 TABLET | Refills: 0 | Status: SHIPPED | OUTPATIENT
Start: 2019-04-05 | End: 2020-09-24 | Stop reason: ALTCHOICE

## 2019-04-05 RX ORDER — INDOMETHACIN 75 MG/1
75 CAPSULE, EXTENDED RELEASE ORAL
Qty: 6 CAPSULE | Refills: 0 | Status: SHIPPED | OUTPATIENT
Start: 2019-04-05 | End: 2019-04-18 | Stop reason: SDUPTHER

## 2019-04-05 NOTE — LETTER
April 5, 2019      [facility]  [address] [facility]  [place] [address]           [facility]  [address]  [place] [address]  Phone: [phone]  Fax: [phone]          Patient: Rob Marx   MR Number: [id number]   YOB: 1970   Date of Visit: 4/5/2019       Dear Stella Tovar:    Thank you for referring Rob Marx to me for evaluation. Attached you will find relevant portions of my assessment and plan of care.    If you have questions, please do not hesitate to call me. I look forward to following Rob Marx along with you.    Sincerely,    Sagar Stone DPM    Enclosure  CC:  No Recipients    If you would like to receive this communication electronically, please contact [email or web address] or [phone] to request more information on [facility] access.    For providers and/or their staff who would like to refer a patient to [facility], please contact us through our one-stop-shop provider referral line, Cj Rice, at [phone].    If you feel you have received this communication in error or would no longer like to receive these types of communications, please e-mail [email or web address]

## 2019-04-05 NOTE — PROGRESS NOTES
"Subjective:     Patient ID: Rob Marx is a 48 y.o. male.    Chief Complaint: Foot Pain (C/O R Foot/Ankle pain. Rates pain 9/10. R foot has some swelling and redness to it. Pt states burning sensation on top of foot. Diabetic PT. Pt wearing tennis shoes wioth socks. NO PCP)    HPI: This 48 year old male presents today complaing of painful swollen right  foot.  Patient states pain and swelling just started all of a sudden 2 weeks ago. Patient denies any injury to the foot. When asked were to indicate where the pain is, patient points to left medial ankle and dorsal midfoot. Patient has no other pedial complaints at this time.         Patient Active Problem List   Diagnosis    Diabetes mellitus type I    Hypertension       Medication List with Changes/Refills   New Medications    COLCHICINE (COLCRYS) 0.6 MG TABLET    Take 1 tablet (0.6 mg total) by mouth once daily. Take one tablet daily. for 6 days    INDOMETHACIN (INDOCIN SR) 75 MG CPSR CR CAPSULE    Take 1 capsule (75 mg total) by mouth daily with breakfast.   Current Medications    ATORVASTATIN (LIPITOR) 40 MG TABLET    Take 40 mg by mouth once daily.    BLOOD SUGAR DIAGNOSTIC STRP    1 each by Misc.(Non-Drug; Combo Route) route 4 (four) times daily. Insurance preferred    BLOOD-GLUCOSE METER KIT    Use as instructed. Insurance preferred    CANDESARTAN (ATACAND) 16 MG TABLET    Take 1 tablet (16 mg total) by mouth once daily.    ESOMEPRAZOLE (NEXIUM) 40 MG CAPSULE    TAKE 1 CAPSULE BY MOUTH ONCE DAILY BEFORE BREAKFAST    INSULIN ASPART U-100 (NOVOLOG FLEXPEN U-100 INSULIN) 100 UNIT/ML INPN PEN    Inject 12 units in the morning with meals and 20 units in the evening with meals.    LANCETS MISC    1 each by Misc.(Non-Drug; Combo Route) route 4 (four) times daily. Insurance preferred    LISINOPRIL 10 MG TABLET        PEN NEEDLE, DIABETIC 32 GAUGE X 5/32" NDLE    1 each by Misc.(Non-Drug; Combo Route) route once daily.    PROPRANOLOL (INDERAL LA) 80 MG 24 " HR CAPSULE        SILDENAFIL (REVATIO) 20 MG TAB    Take 1 tablet (20 mg total) by mouth 3 (three) times daily.    TRESIBA FLEXTOUCH U-200 200 UNIT/ML (3 ML) INPN           Review of patient's allergies indicates:   Allergen Reactions    Benadryl [diphenhydramine hcl] Swelling       Past Surgical History:   Procedure Laterality Date    CYST REMOVAL      age 18       Family History   Problem Relation Age of Onset    Diabetes Paternal Grandmother     Diabetes Paternal Grandfather        Social History     Socioeconomic History    Marital status:      Spouse name: Not on file    Number of children: Not on file    Years of education: Not on file    Highest education level: Not on file   Occupational History    Not on file   Social Needs    Financial resource strain: Not on file    Food insecurity:     Worry: Not on file     Inability: Not on file    Transportation needs:     Medical: Not on file     Non-medical: Not on file   Tobacco Use    Smoking status: Never Smoker    Smokeless tobacco: Current User   Substance and Sexual Activity    Alcohol use: Yes     Frequency: 4 or more times a week    Drug use: No    Sexual activity: Not on file   Lifestyle    Physical activity:     Days per week: Not on file     Minutes per session: Not on file    Stress: Not on file   Relationships    Social connections:     Talks on phone: Not on file     Gets together: Not on file     Attends [employer] service: Not on file     Active member of club or organization: Not on file     Attends meetings of clubs or organizations: Not on file     Relationship status: Not on file   Other Topics Concern    Not on file   Social History Narrative    Not on file       Vitals:    04/05/19 0916   BP: (!) 158/106   Pulse: 109   Weight: 87 kg (191 lb 12.8 oz)   PainSc:   9   PainLoc: Foot       Hemoglobin A1C   Date Value Ref Range Status   01/25/2019 7.9 (H) 4.0 - 5.6 % Final     Comment:     ADA Screening  Guidelines:  5.7-6.4%  Consistent with prediabetes  >or=6.5%  Consistent with diabetes  High levels of fetal hemoglobin interfere with the HbA1C  assay. Heterozygous hemoglobin variants (HbS, HgC, etc)do  not significantly interfere with this assay.   However, presence of multiple variants may affect accuracy.     10/25/2018 9.2 (H) 4.0 - 5.6 % Final     Comment:     ADA Screening Guidelines:  5.7-6.4%  Consistent with prediabetes  >or=6.5%  Consistent with diabetes  High levels of fetal hemoglobin interfere with the HbA1C  assay. Heterozygous hemoglobin variants (HbS, HgC, etc)do  not significantly interfere with this assay.   However, presence of multiple variants may affect accuracy.         Review of Systems   Constitutional: Negative for chills and fever.   Respiratory: Negative for shortness of breath.    Cardiovascular: Negative for chest pain, palpitations, orthopnea, claudication and leg swelling.   Gastrointestinal: Negative for diarrhea, nausea and vomiting.   Musculoskeletal: Positive for joint pain (right midfoot and ankle).   Skin: Negative for rash.   Neurological: Negative for dizziness, tingling, sensory change, focal weakness and weakness.   Psychiatric/Behavioral: Negative.              Objective:      PHYSICAL EXAM: Apperance: Alert and orient in no distress,well developed, and with good attention to grooming and body habits  Patient presents ambulating in tennis shoes.   LOWER EXTREMITIES EXAM:   VASCULAR: Dorsalis pedis pulses 2/4 bilateral and Posterior Tibial pulses 2/4 bilateral. Capillary fill time <4 seconds bilateral. No edema observed bilateral. Varicosities absent bilateral. Skin temperature of the lower extremities is warm to warm, proximal to distal. Hair growth WNL bilateral.  DERMATOLOGICAL: No skin rashes, subcutaneous nodules, lesions, or ulcers observed bilateral. (+) edema, (+) erythema, (+) increased temperature noted to right dorsal midfoot. Webspaces 1,2,3,4 clean, dry and  without evidence of break in skin integrity bilateral.   NEUROLOGICAL: Light touch, sharp-dull, proprioception all present and equal bilaterally.  MUSCULOSKELETAL: Muscle strength is 5/5 for foot inverters, everters, plantarflexors, and dorsiflexors. Muscle tone is normal. Positive painon palpation of right midfoot.      TEST RESULTS: Radiographs of right foot/ankle taken 3/26/19 reveals no acute fracture or dislocation.  Vascular calcifications are noted.  Small plantar calcaneal enthesophyte noted.          Assessment:       Encounter Diagnosis   Name Primary?    Other secondary acute gout of right foot Yes         Plan:   Other secondary acute gout of right foot  -     Sedimentation rate; Future; Expected date: 04/05/2019  -     C-reactive protein; Future; Expected date: 04/05/2019  -     Uric acid; Future; Expected date: 04/05/2019    Other orders  -     colchicine (COLCRYS) 0.6 mg tablet; Take 1 tablet (0.6 mg total) by mouth once daily. Take one tablet daily. for 6 days  Dispense: 6 tablet; Refill: 0  -     indomethacin (INDOCIN SR) 75 mg CpSR CR capsule; Take 1 capsule (75 mg total) by mouth daily with breakfast.  Dispense: 6 capsule; Refill: 0      I counseled the patient on his conditions, regarding findings of my examination, my impressions, and usual treatment plan.   I explained to the patient that etiologies and treatment options for gout including rest, elevation, diet control, NSAID's, long term gout medication, and/or injection therapy.    Prescription written for Colchicine 0.6mg to be taken as prescribed.  Prescribed Indomethacin 75 mg B.I.D. to be taken as needed for inflammation. Patient advised on the possible elevation of blood pressure or heart effects and caution to take pills as needed and to discontinue use if symptoms arise, patient agreed.   Ordered uric acid, crp, and esr testing to be completed today.    Patient to return in 6 weeks or sooner if needed.                   Sagar  Miles, DPM [facility]

## 2019-04-18 ENCOUNTER — TELEPHONE (OUTPATIENT)
Dept: PODIATRY | Facility: CLINIC | Age: 49
End: 2019-04-18

## 2019-04-18 DIAGNOSIS — M10.471 OTHER SECONDARY ACUTE GOUT OF RIGHT FOOT: Primary | ICD-10-CM

## 2019-04-18 RX ORDER — INDOMETHACIN 75 MG/1
75 CAPSULE, EXTENDED RELEASE ORAL
Qty: 14 CAPSULE | Refills: 0 | Status: SHIPPED | OUTPATIENT
Start: 2019-04-18 | End: 2019-05-02

## 2019-04-18 NOTE — TELEPHONE ENCOUNTER
----- Message from Anastasiya Diamond sent at 4/18/2019  9:55 AM CDT -----  Contact: Pt  Pt called to speak to the nurse regarding his care due to a gout flare-up and [phone]. Pt is currently traveling out of town.

## 2019-04-18 NOTE — TELEPHONE ENCOUNTER
Confirmed with Pt an earlier appt at his [facility] and an anti-inflammatory ordered to his pharmacy. Pt agreed!

## 2019-06-28 RX ORDER — ESOMEPRAZOLE MAGNESIUM 40 MG/1
CAPSULE, DELAYED RELEASE ORAL
Qty: 30 CAPSULE | Refills: 2 | Status: SHIPPED | OUTPATIENT
Start: 2019-06-28 | End: 2020-02-20 | Stop reason: SDUPTHER

## 2019-08-26 ENCOUNTER — TELEPHONE (OUTPATIENT)
Dept: DIABETES | Facility: CLINIC | Age: 49
End: 2019-08-26

## 2019-08-26 DIAGNOSIS — E10.8 TYPE 1 DIABETES MELLITUS WITH COMPLICATION: ICD-10-CM

## 2019-08-26 RX ORDER — INSULIN ASPART 100 [IU]/ML
30 INJECTION, SOLUTION INTRAVENOUS; SUBCUTANEOUS
Qty: 9 ML | Refills: 0 | Status: SHIPPED | OUTPATIENT
Start: 2019-08-26 | End: 2019-10-18 | Stop reason: SDUPTHER

## 2019-08-26 RX ORDER — INSULIN DEGLUDEC 200 U/ML
50 INJECTION, SOLUTION SUBCUTANEOUS NIGHTLY
Qty: 1 SYRINGE | Refills: 0 | Status: SHIPPED | OUTPATIENT
Start: 2019-08-26 | End: 2019-10-18 | Stop reason: SDUPTHER

## 2019-08-26 NOTE — TELEPHONE ENCOUNTER
----- Message from Syeda Telles sent at 8/26/2019  4:37 PM CDT -----  Contact: patient   Type:  Sooner Apoointment Request    Caller is requesting a sooner appointment.  Caller declined first available appointment listed below.  Caller will not accept being placed on the waitlist and is requesting a message be sent to doctor.  Name of Caller:Rob Marx   When is the first available appointment?9/6  Symptoms:need insulin  Would the patient rather a call back or a response via [facility]? call  Best Call Back Number:[phone]  Additional Information: pt will be out of insulin in the morning

## 2019-08-26 NOTE — TELEPHONE ENCOUNTER
Returned call. Informed patient's spouse that patient will have to come in for a follow up appointment due to he had not been seen since January. First available date given, but patient's spouse stated that she would have him call back.

## 2019-08-26 NOTE — TELEPHONE ENCOUNTER
Called and spoke with patient. Informed patient that BRENT Greene stated that she will give him enough to last until next appointment,but must follow up in the clinic. Appointment given for 9-6-19. Patient verbalized understanding.

## 2019-08-26 NOTE — TELEPHONE ENCOUNTER
----- Message from Ramona Munson NP sent at 8/26/2019  4:27 PM CDT -----  Contact: Antonieta bcek's wife  appt needed  ----- Message -----  From: Taniya Hendrickson  Sent: 8/26/2019   4:05 PM  To: [facility] Greene Staff    Type:  RX Refill Request    Who Called:Antonieta- Pt 's wife  Refill or New Rx: refill  RX Name and Strength:tresiba  How is the patient currently taking it? (ex. 1XDay): as needed  Is this a 30 day or 90 day RX:90 day if possible  Preferred Pharmacy with phone number: [facility] in [place], [place] [address] [facility]  Local or Mail Order:local  Ordering Provider: arpit  Would the patient rather a call back or a response via [facility]? Call back   Best Call Back Number: [phone]  Additional Information: n/a

## 2019-10-07 ENCOUNTER — TELEPHONE (OUTPATIENT)
Dept: DIABETES | Facility: CLINIC | Age: 49
End: 2019-10-07

## 2019-10-07 NOTE — TELEPHONE ENCOUNTER
Called and rescheduled patient.     ----- Message from Lilia Calhoun sent at 10/7/2019  1:10 PM CDT -----  Contact: Pt  Pt would like nurse to contact him regarding appointment held on tomorrow. Please contact pt at ([phone])

## 2019-10-18 ENCOUNTER — OFFICE VISIT (OUTPATIENT)
Dept: DIABETES | Facility: CLINIC | Age: 49
End: 2019-10-18
Payer: COMMERCIAL

## 2019-10-18 VITALS — WEIGHT: 172.63 LBS | BODY MASS INDEX: 24.08 KG/M2

## 2019-10-18 DIAGNOSIS — E78.5 HYPERLIPIDEMIA, UNSPECIFIED HYPERLIPIDEMIA TYPE: ICD-10-CM

## 2019-10-18 DIAGNOSIS — I10 ESSENTIAL HYPERTENSION: ICD-10-CM

## 2019-10-18 DIAGNOSIS — E10.8 TYPE 1 DIABETES MELLITUS WITH COMPLICATION: Primary | ICD-10-CM

## 2019-10-18 LAB — GLUCOSE SERPL-MCNC: 289 MG/DL (ref 70–110)

## 2019-10-18 PROCEDURE — 99214 PR OFFICE/OUTPT VISIT, EST, LEVL IV, 30-39 MIN: ICD-10-PCS | Mod: S$GLB,,, | Performed by: NURSE PRACTITIONER

## 2019-10-18 PROCEDURE — 99999 PR PBB SHADOW E&M-EST. PATIENT-LVL III: ICD-10-PCS | Mod: PBBFAC,,, | Performed by: NURSE PRACTITIONER

## 2019-10-18 PROCEDURE — 99214 OFFICE O/P EST MOD 30 MIN: CPT | Mod: S$GLB,,, | Performed by: NURSE PRACTITIONER

## 2019-10-18 PROCEDURE — 82962 GLUCOSE BLOOD TEST: CPT | Mod: S$GLB,,, | Performed by: NURSE PRACTITIONER

## 2019-10-18 PROCEDURE — 3008F PR BODY MASS INDEX (BMI) DOCUMENTED: ICD-10-PCS | Mod: CPTII,S$GLB,, | Performed by: NURSE PRACTITIONER

## 2019-10-18 PROCEDURE — 99999 PR PBB SHADOW E&M-EST. PATIENT-LVL III: CPT | Mod: PBBFAC,,, | Performed by: NURSE PRACTITIONER

## 2019-10-18 PROCEDURE — 82962 POCT GLUCOSE, HAND-HELD DEVICE: ICD-10-PCS | Mod: S$GLB,,, | Performed by: NURSE PRACTITIONER

## 2019-10-18 PROCEDURE — 3008F BODY MASS INDEX DOCD: CPT | Mod: CPTII,S$GLB,, | Performed by: NURSE PRACTITIONER

## 2019-10-18 RX ORDER — CANDESARTAN 16 MG/1
16 TABLET ORAL DAILY
Qty: 30 TABLET | Refills: 0 | Status: SHIPPED | OUTPATIENT
Start: 2019-10-18 | End: 2020-02-20 | Stop reason: SDUPTHER

## 2019-10-18 RX ORDER — INSULIN DEGLUDEC 200 U/ML
54 INJECTION, SOLUTION SUBCUTANEOUS NIGHTLY
Qty: 3 SYRINGE | Refills: 2 | Status: SHIPPED | OUTPATIENT
Start: 2019-10-18 | End: 2020-02-09 | Stop reason: SDUPTHER

## 2019-10-18 RX ORDER — INSULIN ASPART 100 [IU]/ML
INJECTION, SOLUTION INTRAVENOUS; SUBCUTANEOUS
Qty: 15 ML | Refills: 1 | Status: SHIPPED | OUTPATIENT
Start: 2019-10-18 | End: 2020-02-20 | Stop reason: SDUPTHER

## 2019-10-18 NOTE — PATIENT INSTRUCTIONS
PATIENT INSTRUCTIONS  - Follow up as scheduled.   - Carb Count: 30-45G per meal and 15G per snack  - Exercise: Goal is 150 minutes or more per week  - Bring meter and blood sugar log to each appointment.     Medication instructions:   - Novolog- continue, make adjustments as discussed   - Tresiba - increase to 54 units       - Blood Sugar Goals:  1. The goal for fasting blood sugars is 80 -130 mg/dl.   2. The goal for the 2 hour after meal blood sugars is below 180 mg/dl.  3. Blood sugars below 70 are considered LOW and you must eat or drink 15G of carbohydrates immediately, then recheck blood sugar after 15 minutes to ensure blood sugar has returned to normal range, (70 or above)

## 2019-10-18 NOTE — PROGRESS NOTES
Subjective:         Patient ID: Rob Marx is a 49 y.o. male.  Patient's current PCP is [facility].       Chief Complaint: Diabetes Mellitus    HPI  Rob Marx is a 48 y.o. White male presenting for a follow up for Type I diabetes. Patient has been diagnosed with diabetes since age 19  and has the following complications from diabetes: HTN, Hyperlipidemia. Blood glucose testing is performed regularly. Fasting 160-170s, PP > 180. The patient reports medication compliance some of the time and diet noncompliance much of the time. He denies recent hospital admissions, denies emergency room visits, reports occassional hypoglycemia at night due to working days and night.          //  Weight: 78.3 kg (172 lb 9.9 oz), Body mass index is 24.08 kg/m².  His blood sugar in clinic today is:   Lab Results   Component Value Date    POCGLU 289 (A) 10/18/2019       Labs reviewed and are noted below.  His most recent A1C is:  Lab Results   Component Value Date    HGBA1C 7.9 (H) 01/25/2019    HGBA1C 9.2 (H) 10/25/2018     Lab Results   Component Value Date    CPEPTIDE <0.08 (L) 01/25/2019     Lab Results   Component Value Date    GLUTAMICACID 0.00 01/25/2019     Glucose   Date Value Ref Range Status   03/26/2019 217 (H) 70 - 110 mg/dL Final     Anion Gap   Date Value Ref Range Status   03/26/2019 13 8 - 16 mmol/L Final     eGFR if    Date Value Ref Range Status   03/26/2019 >60.0 >60 mL/min/1.73 m^2 Final     eGFR if non    Date Value Ref Range Status   03/26/2019 >60.0 >60 mL/min/1.73 m^2 Final     Comment:     Calculation used to obtain the estimated glomerular filtration  rate (eGFR) is the CKD-EPI equation.            CURRENT DM MEDICATIONS:   Diabetes Medications             insulin aspart U-100 (NOVOLOG FLEXPEN U-100 INSULIN) 100 unit/mL (3 mL) InPn pen Inject 30 Units into the skin 2 (two) times daily before meals. (he is taking 10 units for breakfast and 10-20 units  for dinner)    TRESIBA FLEXTOUCH U-200 200 unit/mL (3 mL) InPn Inject 50 Units into the skin every evening.         Diabetes Management Status    Statin: Taking  ACE/ARB: Taking    Screening or Prevention Patient's value Goal Complete/Controlled?   HgA1C Testing and Control   Lab Results   Component Value Date    HGBA1C 7.9 (H) 01/25/2019      Annually/Less than 8% Yes   Lipid profile : 01/25/2019 Annually Yes   LDL control Lab Results   Component Value Date    LDLCALC 120.8 01/25/2019    Annually/Less than 100 mg/dl  No   Nephropathy screening Lab Results   Component Value Date    LABMICR 330.0 01/25/2019     No results found for: PROTEINUA Annually Yes   Blood pressure BP Readings from Last 1 Encounters:   04/05/19 (!) 158/106    Less than 140/90 No   Dilated retinal exam Most Recent Eye Exam Date: Not Found Annually No   Foot exam   : 04/05/2019 Annually Yes     LIFESTYLE:  ACTIVITY LEVEL: Sedentary. EXERCISE: none  MEAL PLANNING: Patient reports number of meals per day to be 2 and number of snacks per day to be 1  BLOOD GLUCOSE TESTING: Patient is testing 3 times per day       Review of Systems   Constitutional: Negative for activity change, appetite change, chills, diaphoresis and fatigue.   Eyes: Negative for visual disturbance.   Respiratory: Negative for cough, chest tightness, shortness of breath and wheezing.    Cardiovascular: Negative for chest pain, palpitations and leg swelling.   Gastrointestinal: Negative for abdominal pain, constipation, diarrhea, nausea and vomiting.   Endocrine: Negative for polydipsia, polyphagia and polyuria.   Neurological: Negative for dizziness, tremors, seizures, syncope, facial asymmetry, speech difficulty, weakness, light-headedness, numbness and headaches.   Psychiatric/Behavioral: Negative for confusion. The patient is not nervous/anxious.          Objective:      Physical Exam   Constitutional: He is oriented to person, place, and time. He appears well-developed and  well-nourished. No distress.   Neck: Normal range of motion.   Cardiovascular: Normal rate.   Pulmonary/Chest: Effort normal.   Musculoskeletal: Normal range of motion.   Neurological: He is alert and oriented to person, place, and time.   Skin: Skin is warm and dry. He is not diaphoretic.   Psychiatric: He has a normal mood and affect. His behavior is normal. Judgment and thought content normal.       Assessment:       1. Type 1 diabetes mellitus with complication    2. Essential hypertension    3. Hyperlipidemia, unspecified hyperlipidemia type        Plan:   Type 1 diabetes mellitus with complication  -     POCT Glucose, Hand-Held Device  -     Hemoglobin A1c; Future; Expected date: 01/18/2020  -     Lipid panel; Future; Expected date: 10/18/2019  -     Comprehensive metabolic panel; Future; Expected date: 10/18/2019  -     TRESIBA FLEXTOUCH U-200 200 unit/mL (3 mL) InPn; Inject 54 Units into the skin every evening.  Dispense: 3 Syringe; Refill: 2  -     insulin aspart U-100 (NOVOLOG FLEXPEN U-100 INSULIN) 100 unit/mL (3 mL) InPn pen; Inject 10-20 units SC 3 times before meals  Dispense: 15 mL; Refill: 1  -     blood-glucose transmitter (DEXCOM G6 TRANSMITTER) Caitie; Use as directed for continuous glucose monitoring- change every 3 months  Dispense: 1 each; Refill: 3  -     blood-glucose sensor (DEXCOM G6 SENSOR) Caitie; Use as directed for continuous glucose monitoring- change every 10 days  Dispense: 3 each; Refill: 11  -     blood-glucose meter,continuous (DEXCOM G6 ) Misc; Use as directed for continuous glucose monitoring  Dispense: 1 each; Refill: 0    Essential hypertension  Comments:  uncontrolled, has not taken BP meds in > 3 month, med refilled, follow up with PCP scheduled   Orders:  -     candesartan (ATACAND) 16 MG tablet; Take 1 tablet (16 mg total) by mouth once daily.  Dispense: 30 tablet; Refill: 0    Hyperlipidemia, unspecified hyperlipidemia type  Comments:  uncontrolled on last labs,  repeat ordered; diet reviewed        PLAN:   - Condition: uncontrolled  due to poor follow up, diet, not taking insulin properly   - Monitor blood glucose 6x daily, fasting and ac dinner or at bedtime. Goals reviewed. Dexcom ordered   - Diet reviewed   - Medication Changes:  Novolog- continue; Increase Tresiba to 54 units   - He is having trouble affording insulin, copay card for Tresiba given   - Discussed the risk of uncontrolled DM  - The patient was explained the above plan and given opportunity to ask questions.  He understands, chooses and consents to this plan and accepts all the risks, which include but are not limited to the risks mentioned above.   - Labs ordered as above  - Eye exam due, to be scheduled by nurse  - Follow up: 2 weeks     A total of 30 minutes was spent in face to face time, of which over 50% was spent in counseling patient on disease process, complications, treatment, and side effects of medications.

## 2019-11-24 RX ORDER — SILDENAFIL CITRATE 20 MG/1
TABLET ORAL
Qty: 5 TABLET | Refills: 11 | Status: SHIPPED | OUTPATIENT
Start: 2019-11-24 | End: 2020-05-14 | Stop reason: SDUPTHER

## 2020-02-08 DIAGNOSIS — E10.8 TYPE 1 DIABETES MELLITUS WITH COMPLICATION: ICD-10-CM

## 2020-02-09 RX ORDER — INSULIN DEGLUDEC 200 U/ML
54 INJECTION, SOLUTION SUBCUTANEOUS NIGHTLY
Qty: 3 SYRINGE | Refills: 0 | Status: SHIPPED | OUTPATIENT
Start: 2020-02-09 | End: 2020-02-20 | Stop reason: SDUPTHER

## 2020-02-20 ENCOUNTER — OFFICE VISIT (OUTPATIENT)
Dept: INTERNAL MEDICINE | Facility: CLINIC | Age: 50
End: 2020-02-20
Payer: COMMERCIAL

## 2020-02-20 ENCOUNTER — OFFICE VISIT (OUTPATIENT)
Dept: PODIATRY | Facility: CLINIC | Age: 50
End: 2020-02-20
Payer: COMMERCIAL

## 2020-02-20 VITALS
DIASTOLIC BLOOD PRESSURE: 110 MMHG | TEMPERATURE: 98 F | WEIGHT: 186.75 LBS | BODY MASS INDEX: 26.15 KG/M2 | SYSTOLIC BLOOD PRESSURE: 160 MMHG | HEIGHT: 71 IN | HEART RATE: 84 BPM

## 2020-02-20 VITALS
WEIGHT: 187.38 LBS | HEIGHT: 71 IN | BODY MASS INDEX: 26.23 KG/M2 | SYSTOLIC BLOOD PRESSURE: 174 MMHG | DIASTOLIC BLOOD PRESSURE: 106 MMHG | HEART RATE: 95 BPM

## 2020-02-20 DIAGNOSIS — R25.1 TREMOR: ICD-10-CM

## 2020-02-20 DIAGNOSIS — K21.9 GASTROESOPHAGEAL REFLUX DISEASE WITHOUT ESOPHAGITIS: ICD-10-CM

## 2020-02-20 DIAGNOSIS — E11.9 COMPREHENSIVE DIABETIC FOOT EXAMINATION, TYPE 2 DM, ENCOUNTER FOR: Primary | ICD-10-CM

## 2020-02-20 DIAGNOSIS — I10 ESSENTIAL HYPERTENSION: ICD-10-CM

## 2020-02-20 DIAGNOSIS — E10.9 TYPE 1 DIABETES MELLITUS WITHOUT COMPLICATION: Primary | ICD-10-CM

## 2020-02-20 DIAGNOSIS — M54.50 CHRONIC BILATERAL LOW BACK PAIN WITHOUT SCIATICA: ICD-10-CM

## 2020-02-20 DIAGNOSIS — G89.29 CHRONIC BILATERAL LOW BACK PAIN WITHOUT SCIATICA: ICD-10-CM

## 2020-02-20 DIAGNOSIS — E10.9 DIABETES MELLITUS TYPE 1, CONTROLLED, WITHOUT COMPLICATIONS: ICD-10-CM

## 2020-02-20 PROBLEM — M1A.0790 CHRONIC IDIOPATHIC GOUT INVOLVING TOE: Status: ACTIVE | Noted: 2020-02-20

## 2020-02-20 PROCEDURE — 3080F DIAST BP >= 90 MM HG: CPT | Mod: CPTII,S$GLB,, | Performed by: INTERNAL MEDICINE

## 2020-02-20 PROCEDURE — 3008F PR BODY MASS INDEX (BMI) DOCUMENTED: ICD-10-PCS | Mod: CPTII,S$GLB,, | Performed by: PODIATRIST

## 2020-02-20 PROCEDURE — 99999 PR PBB SHADOW E&M-EST. PATIENT-LVL IV: CPT | Mod: PBBFAC,,, | Performed by: INTERNAL MEDICINE

## 2020-02-20 PROCEDURE — 3080F PR MOST RECENT DIASTOLIC BLOOD PRESSURE >= 90 MM HG: ICD-10-PCS | Mod: CPTII,S$GLB,, | Performed by: INTERNAL MEDICINE

## 2020-02-20 PROCEDURE — 3080F PR MOST RECENT DIASTOLIC BLOOD PRESSURE >= 90 MM HG: ICD-10-PCS | Mod: CPTII,S$GLB,, | Performed by: PODIATRIST

## 2020-02-20 PROCEDURE — 3008F BODY MASS INDEX DOCD: CPT | Mod: CPTII,S$GLB,, | Performed by: PODIATRIST

## 2020-02-20 PROCEDURE — 99999 PR PBB SHADOW E&M-EST. PATIENT-LVL III: CPT | Mod: PBBFAC,,, | Performed by: PODIATRIST

## 2020-02-20 PROCEDURE — 99999 PR PBB SHADOW E&M-EST. PATIENT-LVL III: ICD-10-PCS | Mod: PBBFAC,,, | Performed by: PODIATRIST

## 2020-02-20 PROCEDURE — 3080F DIAST BP >= 90 MM HG: CPT | Mod: CPTII,S$GLB,, | Performed by: PODIATRIST

## 2020-02-20 PROCEDURE — 99213 PR OFFICE/OUTPT VISIT, EST, LEVL III, 20-29 MIN: ICD-10-PCS | Mod: S$GLB,,, | Performed by: PODIATRIST

## 2020-02-20 PROCEDURE — 99213 OFFICE O/P EST LOW 20 MIN: CPT | Mod: S$GLB,,, | Performed by: PODIATRIST

## 2020-02-20 PROCEDURE — 3077F PR MOST RECENT SYSTOLIC BLOOD PRESSURE >= 140 MM HG: ICD-10-PCS | Mod: CPTII,S$GLB,, | Performed by: INTERNAL MEDICINE

## 2020-02-20 PROCEDURE — 3077F PR MOST RECENT SYSTOLIC BLOOD PRESSURE >= 140 MM HG: ICD-10-PCS | Mod: CPTII,S$GLB,, | Performed by: PODIATRIST

## 2020-02-20 PROCEDURE — 99214 OFFICE O/P EST MOD 30 MIN: CPT | Mod: S$GLB,,, | Performed by: INTERNAL MEDICINE

## 2020-02-20 PROCEDURE — 3077F SYST BP >= 140 MM HG: CPT | Mod: CPTII,S$GLB,, | Performed by: INTERNAL MEDICINE

## 2020-02-20 PROCEDURE — 3008F BODY MASS INDEX DOCD: CPT | Mod: CPTII,S$GLB,, | Performed by: INTERNAL MEDICINE

## 2020-02-20 PROCEDURE — 99999 PR PBB SHADOW E&M-EST. PATIENT-LVL IV: ICD-10-PCS | Mod: PBBFAC,,, | Performed by: INTERNAL MEDICINE

## 2020-02-20 PROCEDURE — 3008F PR BODY MASS INDEX (BMI) DOCUMENTED: ICD-10-PCS | Mod: CPTII,S$GLB,, | Performed by: INTERNAL MEDICINE

## 2020-02-20 PROCEDURE — 99214 PR OFFICE/OUTPT VISIT, EST, LEVL IV, 30-39 MIN: ICD-10-PCS | Mod: S$GLB,,, | Performed by: INTERNAL MEDICINE

## 2020-02-20 PROCEDURE — 3077F SYST BP >= 140 MM HG: CPT | Mod: CPTII,S$GLB,, | Performed by: PODIATRIST

## 2020-02-20 RX ORDER — PROPRANOLOL HYDROCHLORIDE 80 MG/1
80 CAPSULE, EXTENDED RELEASE ORAL DAILY
Qty: 90 CAPSULE | Refills: 3 | Status: SHIPPED | OUTPATIENT
Start: 2020-02-20 | End: 2020-03-17 | Stop reason: SDUPTHER

## 2020-02-20 RX ORDER — ESOMEPRAZOLE MAGNESIUM 40 MG/1
40 CAPSULE, DELAYED RELEASE ORAL DAILY
Qty: 90 CAPSULE | Refills: 3 | Status: SHIPPED | OUTPATIENT
Start: 2020-02-20 | End: 2021-04-08

## 2020-02-20 RX ORDER — INSULIN ASPART 100 [IU]/ML
INJECTION, SOLUTION INTRAVENOUS; SUBCUTANEOUS
Qty: 15 ML | Refills: 11 | Status: SHIPPED | OUTPATIENT
Start: 2020-02-20 | End: 2020-08-26 | Stop reason: SDUPTHER

## 2020-02-20 RX ORDER — CANDESARTAN 16 MG/1
16 TABLET ORAL DAILY
Qty: 30 TABLET | Refills: 0 | Status: SHIPPED | OUTPATIENT
Start: 2020-02-20 | End: 2020-03-11

## 2020-02-20 RX ORDER — ATORVASTATIN CALCIUM 40 MG/1
40 TABLET, FILM COATED ORAL DAILY
Qty: 90 TABLET | Refills: 3 | Status: SHIPPED | OUTPATIENT
Start: 2020-02-20 | End: 2022-03-14 | Stop reason: SDUPTHER

## 2020-02-20 RX ORDER — MELOXICAM 15 MG/1
15 TABLET ORAL DAILY
Qty: 30 TABLET | Refills: 11 | Status: SHIPPED | OUTPATIENT
Start: 2020-02-20 | End: 2020-09-24 | Stop reason: ALTCHOICE

## 2020-02-20 RX ORDER — INSULIN DEGLUDEC 200 U/ML
54 INJECTION, SOLUTION SUBCUTANEOUS NIGHTLY
Qty: 15 ML | Refills: 11 | Status: SHIPPED | OUTPATIENT
Start: 2020-02-20 | End: 2020-02-26

## 2020-02-21 NOTE — PROGRESS NOTES
Subjective:       Patient ID: Rob Marx is a 49 y.o. male.    Chief Complaint: Follow-up (Medication Refill pt rates pain 0/10 boots w/socks diabetic pt PCP Dr. Dang.)      HPI: Rob Marx presents to the office today, under referral by their Primary Care Provider, Rudy Cochran MD, for his annual diabetic foot assessment and risk evalution Patient is a DMI. Patient states no complications due to the disease state. This patient last saw his/her primary care provider on today.  Patient states he is in need of a refill of all of his diabetic medications.  Patient presents this afternoon with his wife.    Hemoglobin A1C   Date Value Ref Range Status   01/25/2019 7.9 (H) 4.0 - 5.6 % Final     Comment:     ADA Screening Guidelines:  5.7-6.4%  Consistent with prediabetes  >or=6.5%  Consistent with diabetes  High levels of fetal hemoglobin interfere with the HbA1C  assay. Heterozygous hemoglobin variants (HbS, HgC, etc)do  not significantly interfere with this assay.   However, presence of multiple variants may affect accuracy.     10/25/2018 9.2 (H) 4.0 - 5.6 % Final     Comment:     ADA Screening Guidelines:  5.7-6.4%  Consistent with prediabetes  >or=6.5%  Consistent with diabetes  High levels of fetal hemoglobin interfere with the HbA1C  assay. Heterozygous hemoglobin variants (HbS, HgC, etc)do  not significantly interfere with this assay.   However, presence of multiple variants may affect accuracy.     .    Review of patient's allergies indicates:   Allergen Reactions    Benadryl [diphenhydramine hcl] Swelling       Past Medical History:   Diagnosis Date    Diabetes mellitus type I     Hypertension        Family History   Problem Relation Age of Onset    Diabetes Paternal Grandmother     Diabetes Paternal Grandfather        Social History     Socioeconomic History    Marital status:      Spouse name: Not on file    Number of children: Not on file    Years of education: Not on  "file    Highest education level: Not on file   Occupational History    Not on file   Social Needs    Financial resource strain: Not on file    Food insecurity:     Worry: Not on file     Inability: Not on file    Transportation needs:     Medical: Not on file     Non-medical: Not on file   Tobacco Use    Smoking status: Never Smoker    Smokeless tobacco: Current User   Substance and Sexual Activity    Alcohol use: Yes     Frequency: 4 or more times a week    Drug use: No    Sexual activity: Not on file   Lifestyle    Physical activity:     Days per week: Not on file     Minutes per session: Not on file    Stress: Not on file   Relationships    Social connections:     Talks on phone: Not on file     Gets together: Not on file     Attends Tenriism service: Not on file     Active member of club or organization: Not on file     Attends meetings of clubs or organizations: Not on file     Relationship status: Not on file   Other Topics Concern    Not on file   Social History Narrative    Not on file       Past Surgical History:   Procedure Laterality Date    CYST REMOVAL      age 18       Review of Systems   Constitutional: Negative for chills, fatigue and fever.   HENT: Negative for hearing loss.    Eyes: Negative for photophobia and visual disturbance.   Respiratory: Negative for cough, chest tightness, shortness of breath and wheezing.    Cardiovascular: Negative for chest pain and palpitations.   Gastrointestinal: Negative for constipation, diarrhea, nausea and vomiting.   Endocrine: Negative for cold intolerance and heat intolerance.   Genitourinary: Negative for flank pain.   Musculoskeletal: Negative for neck pain and neck stiffness.   Skin: Negative for wound.   Neurological: Negative for light-headedness and headaches.   Psychiatric/Behavioral: Negative for sleep disturbance.         Objective:   BP (!) 174/106   Pulse 95   Ht 5' 11" (1.803 m)   Wt 85 kg (187 lb 6.3 oz)   BMI 26.14 kg/m² "     Physical Exam  LOWER EXTREMITY PHYSICAL EXAMINATION    ORTHOPEDIC: Manual Muscle Testing is 5/5 in all planes on the left and right, without pains, with and without resistance. No pains to palpation of the medial or lateral ankle ligaments. No discomfort to palpation of the posterior tibial tendon, peroneal tendon, Achilles tendon or the anterior ankle tendons.  Rectus foot type is noted. No pain upon range of motion of the midfoot or hindfoot joints. No crepitus upon range of motion and midfoot or hindfoot joints. No ankle pathology is noted. Gait pattern is non-antalgic.    VASCULAR: Pulses are palpable to the B/L lower extremity. The right dorsalis pedis pulse is 2/4 and the posterior tibial pulse is 2/4. The left dorsalis pedis pulse is 2/4 and the posterior tibial pulse is 2/4. Hair growth is noted on the dorsal foot and digits. Proximal to distal, warm to warm. Capillary refill time is WNL at less than 3s.    NEUROLOGY: Proprioception is intact. Sensation to light touch is intact. Protective sensation is intact via 5.07 [place] Iman monofilament. Straight leg raise is negative. Negative Tinel's Sign and negative Valleix Sign. No neurological sensations with compression of the area of Haddad's Nerve in the area of the Abductor Hallucis muscle belly. Upon palpation of the interspaces, there are no neurological sensations stated that radiate proximal or distal. Upon compression of the metatarsal heads from medial to lateral, no neurological sensations or symptoms are stated. Deep tendon reflexes to the lower extremity are WNL.    DERMATOLOGY: Skin is supple, moist, dry and intact.     Assessment:     1. Comprehensive diabetic foot examination, type 2 DM, encounter for    2. Diabetes mellitus type 1, controlled, without complications        Plan:     Comprehensive diabetic foot examination, type 2 DM, encounter for    Diabetes mellitus type 1, controlled, without complications      I counseled the patient  on his/her Diabetic Mellitus regarding today's clinical examination and objection findings. We did also discuss recent medication changes, pertinent labs and imaging evaluations and other medical consultation notes and progress notes. Greater than 50% of this visit was spent on counseling and coordination of care. Greater than 20 minutes of this appt. was spent on education about the diabetic foot, in relation to PVD and/or neuropathy, and the prevention of limb loss.     Shoe gear is inspected and wear and proper fit/type. Patient is reminded of the importance of good nutrition and blood sugar control to help prevent podiatric complications of diabetes. Patient instructed on proper foot hygeine. We discussed wearing proper shoe gear, daily foot inspections, never walking without protective shoe gear, never putting sharp instruments to feet.  Patient  will continue to monitor the areas daily, inspect feet, wear protective shoe gear when ambulatory, moisturizer to maintain skin integrity.     Patient's DMI/DMII is managed by Primary Care Provider and/or Endocrinology Advanced Practice Provider. As per the most recent glycohemoglobin level, this patient is at goal.       Protective Sensation (w/ 10 gram monofilament):  Right: Intact  Left: Intact    Visual Inspection:  Normal -  Bilateral    Pedal Pulses:   Right: Present  Left: Present    Posterior tibialis:   Right:Present  Left: Present              Future Appointments   Date Time Provider Department [place]   2/25/2020  8:40 AM [facility]END[facility]   2/25/2020 12:10 PM LABORATORYDI[facility]NE LAB [facility]

## 2020-02-21 NOTE — PROGRESS NOTES
Subjective:       Patient ID: Rob Marx is a 49 y.o. male.    Chief Complaint: Follow-up    HPI Patient is a 49-year-old male presenting today for follow-up on his diabetes, hypertension, essential tremor, GERD.  This the 1st time I am seeing him he was seen previously by diabetes education predominantly.  He has seen TIEN [place] as well.    Patient is type 1 diabetic.  He states he was diagnosed in late childhood.  He is been on insulin.  He indicates his sugars have been adequately controlled.  His last A1c was 7.9 but that was quite sometime ago.  He notes his fasting sugars tend to be around 150 so there probably is some room for improvement but we need to get some updated lab work.  He denies end-organ abnormalities.  He states he does get an eye exam every year at [facility] by his house.    He has had a history of hypertension he is currently off his blood pressure medications.  He was like to get back on them at this point he is not having any signs or symptoms of cardiac decompensation.    He has an essential tremor for which he takes propranolol.  He states that works very well for him and he has had no issues with it.    He has reflux and he is on PPI therapy.  A states that works well.    He has had chronic back issues going on for years.  He currently had sort of an acute exacerbation over the last few weeks.  He states his back old today her hurt him at the end of the day.  He does do physical work so it is likely that he has got some underlying arthritic issues.  He also had some x-rays in the past which does show some degenerative changes.  He is not treating it with anything at this time he is not any physical therapy.  He does not have radiation into his legs.    Review of Systems   Constitutional: Negative for fever and unexpected weight change.   HENT: Negative for hearing loss, postnasal drip and rhinorrhea.    Eyes: Negative for pain and visual disturbance.   Respiratory: Negative  "for cough, shortness of breath and wheezing.    Cardiovascular: Negative for chest pain and palpitations.   Gastrointestinal: Negative for constipation, diarrhea, nausea and vomiting.   Genitourinary: Negative for dysuria and hematuria.   Musculoskeletal: Negative for arthralgias, back pain, myalgias and neck stiffness.   Skin: Negative for pallor and rash.   Neurological: Negative for seizures, syncope and headaches.   Hematological: Negative for adenopathy.   Psychiatric/Behavioral: Negative for dysphoric mood. The patient is not nervous/anxious.        Objective:   BP (!) 160/110   Pulse 84   Temp 98.1 °F (36.7 °C) (Oral)   Ht 5' 11" (1.803 m)   Wt 84.7 kg (186 lb 11.7 oz)   BMI 26.04 kg/m²      Physical Exam   Constitutional: He is oriented to person, place, and time. He appears well-developed and well-nourished. No distress.   HENT:   Head: Normocephalic and atraumatic.   Mouth/Throat: Oropharynx is clear and moist.   Eyes: Pupils are equal, round, and reactive to light. EOM are normal.   Neck: Normal range of motion. Neck supple. No JVD present. No thyromegaly present.   Cardiovascular: Normal rate, regular rhythm, normal heart sounds and intact distal pulses. Exam reveals no gallop and no friction rub.   No murmur heard.  Pulmonary/Chest: Effort normal and breath sounds normal. He has no wheezes. He has no rales.   Abdominal: Soft. Bowel sounds are normal. He exhibits no distension. There is no tenderness. There is no rebound and no guarding.   Musculoskeletal: Normal range of motion. He exhibits no edema.   Lymphadenopathy:     He has no cervical adenopathy.   Neurological: He is alert and oriented to person, place, and time. He has normal reflexes. No cranial nerve deficit.   Skin: Skin is warm and dry. No rash noted.   Psychiatric: He has a normal mood and affect. Judgment normal.   Vitals reviewed.      No visits with results within 2 Week(s) from this visit.   Latest known visit with results is: "   Office Visit on 10/18/2019   Component Date Value    POC Glucose 10/18/2019 289*       Assessment:       1. Type 1 diabetes mellitus without complication    2. Essential hypertension    3. Tremor    4. Essential hypertension    5. Gastroesophageal reflux disease without esophagitis    6. Chronic bilateral low back pain without sciatica        Plan:   No problem-specific Assessment & Plan notes found for this encounter.    Type 1 diabetes mellitus without complication  Comments:  continue current therapy, update a1c.  Continue atorvastatin.  Orders:  -     atorvastatin (LIPITOR) 40 MG tablet; Take 1 tablet (40 mg total) by mouth once daily.  Dispense: 90 tablet; Refill: 3  -     insulin degludec (TRESIBA FLEXTOUCH U-200) 200 unit/mL (3 mL) InPn; 54 Units by abdominal subcutaneous route every evening.  Dispense: 15 mL; Refill: 11  -     insulin aspart U-100 (NOVOLOG FLEXPEN U-100 INSULIN) 100 unit/mL (3 mL) InPn pen; Inject 10-20 units SC 3 times before meals  Dispense: 15 mL; Refill: 11  -     CBC auto differential; Future; Expected date: 02/25/2020  -     Comprehensive metabolic panel; Future; Expected date: 02/25/2020  -     Hemoglobin A1c; Future; Expected date: 02/25/2020  -     Lipid panel; Future; Expected date: 02/25/2020  -     Microalbumin/creatinine urine ratio; Future; Expected date: 02/25/2020    Essential hypertension  Comments:  off meds today, resume meds and schedule follow up to recheck.  Orders:  -     candesartan (ATACAND) 16 MG tablet; Take 1 tablet (16 mg total) by mouth once daily.  Dispense: 30 tablet; Refill: 0    Tremor  Comments:  continue propranolol.   Orders:  -     propranolol (INDERAL [place]) 80 MG 24 hr capsule; Take 1 capsule (80 mg total) by mouth once daily.  Dispense: 90 capsule; Refill: 3    Essential hypertension  Comments:  uncontrolled, has not taken BP meds in > 3 month, med refilled, follow up with PCP scheduled   Orders:  -     candesartan (ATACAND) 16 MG tablet; Take 1  tablet (16 mg total) by mouth once daily.  Dispense: 30 tablet; Refill: 0    Gastroesophageal reflux disease without esophagitis  -     esomeprazole (NEXIUM) 40 MG capsule; Take 1 capsule (40 mg total) by mouth once daily.  Dispense: 90 capsule; Refill: 3    Chronic bilateral low back pain without sciatica  Comments:  Meloxicam 15 mg once daily, refer to PT  Orders:  -     meloxicam (MOBIC) 15 MG tablet; Take 1 tablet (15 mg total) by mouth once daily.  Dispense: 30 tablet; Refill: 11  -     Ambulatory referral/consult to Physical/Occupational Therapy          Follow up in about 3 months (around 5/20/2020).

## 2020-02-25 ENCOUNTER — LAB VISIT (OUTPATIENT)
Dept: LAB | Facility: HOSPITAL | Age: 50
End: 2020-02-25
Attending: INTERNAL MEDICINE
Payer: COMMERCIAL

## 2020-02-25 DIAGNOSIS — E10.9 TYPE 1 DIABETES MELLITUS WITHOUT COMPLICATION: ICD-10-CM

## 2020-02-25 LAB
ALBUMIN SERPL BCP-MCNC: 3.4 G/DL (ref 3.5–5.2)
ALP SERPL-CCNC: 60 U/L (ref 55–135)
ALT SERPL W/O P-5'-P-CCNC: 29 U/L (ref 10–44)
ANION GAP SERPL CALC-SCNC: 8 MMOL/L (ref 8–16)
AST SERPL-CCNC: 28 U/L (ref 10–40)
BASOPHILS # BLD AUTO: 0.06 K/UL (ref 0–0.2)
BASOPHILS NFR BLD: 1.1 % (ref 0–1.9)
BILIRUB SERPL-MCNC: 0.7 MG/DL (ref 0.1–1)
BUN SERPL-MCNC: 12 MG/DL (ref 6–20)
CALCIUM SERPL-MCNC: 8.7 MG/DL (ref 8.7–10.5)
CHLORIDE SERPL-SCNC: 103 MMOL/L (ref 95–110)
CHOLEST SERPL-MCNC: 173 MG/DL (ref 120–199)
CHOLEST/HDLC SERPL: 3.3 {RATIO} (ref 2–5)
CO2 SERPL-SCNC: 28 MMOL/L (ref 23–29)
CREAT SERPL-MCNC: 1.3 MG/DL (ref 0.5–1.4)
DIFFERENTIAL METHOD: ABNORMAL
EOSINOPHIL # BLD AUTO: 0.1 K/UL (ref 0–0.5)
EOSINOPHIL NFR BLD: 2.4 % (ref 0–8)
ERYTHROCYTE [DISTWIDTH] IN BLOOD BY AUTOMATED COUNT: 12.4 % (ref 11.5–14.5)
EST. GFR  (AFRICAN AMERICAN): >60 ML/MIN/1.73 M^2
EST. GFR  (NON AFRICAN AMERICAN): >60 ML/MIN/1.73 M^2
ESTIMATED AVG GLUCOSE: 194 MG/DL (ref 68–131)
GLUCOSE SERPL-MCNC: 105 MG/DL (ref 70–110)
HBA1C MFR BLD HPLC: 8.4 % (ref 4–5.6)
HCT VFR BLD AUTO: 51.5 % (ref 40–54)
HDLC SERPL-MCNC: 53 MG/DL (ref 40–75)
HDLC SERPL: 30.6 % (ref 20–50)
HGB BLD-MCNC: 17.1 G/DL (ref 14–18)
IMM GRANULOCYTES # BLD AUTO: 0.01 K/UL (ref 0–0.04)
IMM GRANULOCYTES NFR BLD AUTO: 0.2 % (ref 0–0.5)
LDLC SERPL CALC-MCNC: 90.2 MG/DL (ref 63–159)
LYMPHOCYTES # BLD AUTO: 2.2 K/UL (ref 1–4.8)
LYMPHOCYTES NFR BLD: 39.7 % (ref 18–48)
MCH RBC QN AUTO: 31.6 PG (ref 27–31)
MCHC RBC AUTO-ENTMCNC: 33.2 G/DL (ref 32–36)
MCV RBC AUTO: 95 FL (ref 82–98)
MONOCYTES # BLD AUTO: 0.5 K/UL (ref 0.3–1)
MONOCYTES NFR BLD: 8.3 % (ref 4–15)
NEUTROPHILS # BLD AUTO: 2.6 K/UL (ref 1.8–7.7)
NEUTROPHILS NFR BLD: 48.3 % (ref 38–73)
NONHDLC SERPL-MCNC: 120 MG/DL
NRBC BLD-RTO: 0 /100 WBC
PLATELET # BLD AUTO: 253 K/UL (ref 150–350)
PMV BLD AUTO: 10.4 FL (ref 9.2–12.9)
POTASSIUM SERPL-SCNC: 4.7 MMOL/L (ref 3.5–5.1)
PROT SERPL-MCNC: 6.6 G/DL (ref 6–8.4)
RBC # BLD AUTO: 5.41 M/UL (ref 4.6–6.2)
SODIUM SERPL-SCNC: 139 MMOL/L (ref 136–145)
TRIGL SERPL-MCNC: 149 MG/DL (ref 30–150)
WBC # BLD AUTO: 5.42 K/UL (ref 3.9–12.7)

## 2020-02-25 PROCEDURE — 83036 HEMOGLOBIN GLYCOSYLATED A1C: CPT

## 2020-02-25 PROCEDURE — 85025 COMPLETE CBC W/AUTO DIFF WBC: CPT

## 2020-02-25 PROCEDURE — 36415 COLL VENOUS BLD VENIPUNCTURE: CPT | Mod: PO

## 2020-02-25 PROCEDURE — 80061 LIPID PANEL: CPT

## 2020-02-25 PROCEDURE — 80053 COMPREHEN METABOLIC PANEL: CPT

## 2020-02-26 ENCOUNTER — PATIENT MESSAGE (OUTPATIENT)
Dept: INTERNAL MEDICINE | Facility: CLINIC | Age: 50
End: 2020-02-26

## 2020-02-26 ENCOUNTER — PATIENT OUTREACH (OUTPATIENT)
Dept: ADMINISTRATIVE | Facility: HOSPITAL | Age: 50
End: 2020-02-26

## 2020-02-26 DIAGNOSIS — E10.9 TYPE 1 DIABETES MELLITUS WITHOUT COMPLICATION: ICD-10-CM

## 2020-02-26 DIAGNOSIS — E10.21 DIABETIC NEPHROPATHY ASSOCIATED WITH TYPE 1 DIABETES MELLITUS: ICD-10-CM

## 2020-02-26 DIAGNOSIS — E10.9 TYPE 1 DIABETES MELLITUS WITHOUT COMPLICATION: Primary | ICD-10-CM

## 2020-02-26 RX ORDER — INSULIN DEGLUDEC 200 U/ML
60 INJECTION, SOLUTION SUBCUTANEOUS NIGHTLY
Qty: 15 ML | Refills: 11 | Status: SHIPPED | OUTPATIENT
Start: 2020-02-26 | End: 2021-03-03

## 2020-02-26 NOTE — TELEPHONE ENCOUNTER
a1c is over 8.  Needs better control of sugars.  Increase tresiba dose to 60 units daily.  Referral to endocrinology placed.    He is also showing damage to his kidneys related to his diabetes.  Referral to nephrology placed.

## 2020-02-27 NOTE — TELEPHONE ENCOUNTER
Patient was informed of his results via phone.  Patient verbalized understanding.  Patient was scheduled for Endocrinology and nephrology.

## 2020-03-10 DIAGNOSIS — I10 ESSENTIAL HYPERTENSION: ICD-10-CM

## 2020-03-11 RX ORDER — CANDESARTAN 16 MG/1
TABLET ORAL
Qty: 90 TABLET | Refills: 3 | Status: SHIPPED | OUTPATIENT
Start: 2020-03-11 | End: 2020-03-17 | Stop reason: SDUPTHER

## 2020-03-17 DIAGNOSIS — R25.1 TREMOR: ICD-10-CM

## 2020-03-17 DIAGNOSIS — I10 ESSENTIAL HYPERTENSION: ICD-10-CM

## 2020-03-17 RX ORDER — CANDESARTAN 16 MG/1
16 TABLET ORAL DAILY
Qty: 90 TABLET | Refills: 3 | Status: SHIPPED | OUTPATIENT
Start: 2020-03-17 | End: 2020-09-24

## 2020-03-17 RX ORDER — PROPRANOLOL HYDROCHLORIDE 80 MG/1
80 CAPSULE, EXTENDED RELEASE ORAL DAILY
Qty: 90 CAPSULE | Refills: 3 | Status: SHIPPED | OUTPATIENT
Start: 2020-03-17 | End: 2021-04-08

## 2020-03-18 ENCOUNTER — TELEPHONE (OUTPATIENT)
Dept: INTERNAL MEDICINE | Facility: CLINIC | Age: 50
End: 2020-03-18

## 2020-03-18 NOTE — TELEPHONE ENCOUNTER
----- Message from Darrell Carreno sent at 3/18/2020 10:50 AM CDT -----  Contact: wife-enriqueta  Requesting call back regarding getting status of pt refill on blood pressure medication. Please call back at [phone].

## 2020-03-30 DIAGNOSIS — E10.21 TYPE 1 DIABETES MELLITUS WITH NEPHROPATHY: Primary | ICD-10-CM

## 2020-03-31 NOTE — TELEPHONE ENCOUNTER
----- Message from Jocy Brandt sent at 3/17/2020 11:47 AM CDT -----  Contact: Antonieta/wife [phone]  Type:  RX Refill Request    Who Called: Antonieta / wife  Refill or New Rx:refill  RX Name and Strength: Blood pressure med (did not know name or strength of medication)  How is the patient currently taking it? (ex. 1XDay):1x day  Is this a 30 day or 90 day RX: 90 day  Preferred Pharmacy with phone number:     [facility] - [place], [place] - [address] [facility]  [address] [facility]  [place] [address]  Phone: [phone] Fax: [phone]    Local or Mail Order:local  Ordering Provider:Sammie  Would the patient rather a call back or a response via [facility]? Call back  Best Call Back Number: [phone]  Additional Information:       Negative

## 2020-04-09 ENCOUNTER — OFFICE VISIT (OUTPATIENT)
Dept: NEPHROLOGY | Facility: CLINIC | Age: 50
End: 2020-04-09
Payer: COMMERCIAL

## 2020-04-09 ENCOUNTER — OFFICE VISIT (OUTPATIENT)
Dept: ENDOCRINOLOGY | Facility: CLINIC | Age: 50
End: 2020-04-09
Payer: COMMERCIAL

## 2020-04-09 ENCOUNTER — PATIENT OUTREACH (OUTPATIENT)
Dept: ADMINISTRATIVE | Facility: OTHER | Age: 50
End: 2020-04-09

## 2020-04-09 DIAGNOSIS — E78.5 DYSLIPIDEMIA: ICD-10-CM

## 2020-04-09 DIAGNOSIS — R73.09 ELEVATED HEMOGLOBIN A1C: ICD-10-CM

## 2020-04-09 DIAGNOSIS — E11.21 DIABETIC NEPHROPATHY ASSOCIATED WITH TYPE 2 DIABETES MELLITUS: ICD-10-CM

## 2020-04-09 DIAGNOSIS — R80.9 MICROALBUMINURIA DUE TO TYPE 2 DIABETES MELLITUS: Primary | ICD-10-CM

## 2020-04-09 DIAGNOSIS — E10.65 UNCONTROLLED TYPE 1 DIABETES MELLITUS WITH HYPERGLYCEMIA: Primary | ICD-10-CM

## 2020-04-09 DIAGNOSIS — E11.29 MICROALBUMINURIA DUE TO TYPE 2 DIABETES MELLITUS: Primary | ICD-10-CM

## 2020-04-09 DIAGNOSIS — Z71.89 ENCOUNTER FOR MEDICATION REVIEW AND COUNSELING: ICD-10-CM

## 2020-04-09 DIAGNOSIS — N11.9 CHRONIC INTERSTITIAL NEPHRITIS: ICD-10-CM

## 2020-04-09 DIAGNOSIS — I10 ESSENTIAL HYPERTENSION: ICD-10-CM

## 2020-04-09 DIAGNOSIS — N14.0 ANALGESIC NEPHROPATHY: ICD-10-CM

## 2020-04-09 DIAGNOSIS — E10.9 TYPE 1 DIABETES MELLITUS WITHOUT COMPLICATION: ICD-10-CM

## 2020-04-09 DIAGNOSIS — E10.21 DIABETIC NEPHROPATHY ASSOCIATED WITH TYPE 1 DIABETES MELLITUS: ICD-10-CM

## 2020-04-09 PROBLEM — E11.65 UNCONTROLLED TYPE 2 DIABETES MELLITUS WITH HYPERGLYCEMIA: Status: ACTIVE | Noted: 2020-04-09

## 2020-04-09 PROCEDURE — 99204 OFFICE O/P NEW MOD 45 MIN: CPT | Mod: 95,,, | Performed by: INTERNAL MEDICINE

## 2020-04-09 PROCEDURE — 3052F PR MOST RECENT HEMOGLOBIN A1C LEVEL 8.0 - < 9.0%: ICD-10-PCS | Mod: CPTII,,, | Performed by: INTERNAL MEDICINE

## 2020-04-09 PROCEDURE — 3052F HG A1C>EQUAL 8.0%<EQUAL 9.0%: CPT | Mod: CPTII,,, | Performed by: INTERNAL MEDICINE

## 2020-04-09 PROCEDURE — 99203 OFFICE O/P NEW LOW 30 MIN: CPT | Mod: 95,,, | Performed by: INTERNAL MEDICINE

## 2020-04-09 PROCEDURE — 99204 PR OFFICE/OUTPT VISIT, NEW, LEVL IV, 45-59 MIN: ICD-10-PCS | Mod: 95,,, | Performed by: INTERNAL MEDICINE

## 2020-04-09 PROCEDURE — 99203 PR OFFICE/OUTPT VISIT, NEW, LEVL III, 30-44 MIN: ICD-10-PCS | Mod: 95,,, | Performed by: INTERNAL MEDICINE

## 2020-04-09 NOTE — LETTER
April 9, 2020      Rudy Cochran MD  [address] [facility]  [address]  [place] [address]           [facility]  [address] [facility] [place] [address]  Phone: [phone]  Fax: [phone]          Patient: Rob Marx   MR Number: [id number]   YOB: 1970   Date of Visit: 4/9/2020       Dear Dr. Rudy Cochran:    Thank you for referring Rob Marx to me for evaluation. Attached you will find relevant portions of my assessment and plan of care.    If you have questions, please do not hesitate to call me. I look forward to following Rob Marx along with you.    Sincerely,    Julia Henderson MD    Enclosure  CC:  No Recipients    If you would like to receive this communication electronically, please contact [email or web address] or [phone] to request more information on [employer] access.    For providers and/or their staff who would like to refer a patient to [facility], please contact us through our one-stop-shop provider referral line, [facility], at [phone].    If you feel you have received this communication in error or would no longer like to receive these types of communications, please e-mail [email or web address]

## 2020-04-09 NOTE — LETTER
April 9, 2020      Rudy Cochran MD  [address] beds at the [facility]  [address]  [place] [address]           [facility]  [address] [facility] [place] [address]  Phone: [phone]  Fax: [phone]          Patient: Rob Marx   MR Number: [id number]   YOB: 1970   Date of Visit: 4/9/2020       Dear Dr. Rudy Cochran:    Thank you for referring Rob Marx to me for evaluation. Attached you will find relevant portions of my assessment and plan of care.    If you have questions, please do not hesitate to call me. I look forward to following Rob Marx along with you.    Sincerely,    Danisha Palacio MD    Enclosure  CC:  No Recipients    If you would like to receive this communication electronically, please contact [email or web address] or [phone] to request more information on frintit Link access.    For providers and/or their staff who would like to refer a patient to [facility], please contact us through our one-stop-shop provider referral line, [facility], at [phone].    If you feel you have received this communication in error or would no longer like to receive these types of communications, please e-mail [email or web address]

## 2020-04-09 NOTE — PROGRESS NOTES
Rob Marx is a 49 y.o. male for whom nephrology consult has been requested to evaluate and give opinion.   Renal clinic consult note  Visit was made virtually due to coronavirus by video  Date of visit: 4/9/20  Reason for consult: microalbuminuria and DM and its effect of the kidneys  Referring physician: Dr. Rudy Cochran    HPI: Thank you for referring the pt to us. Pt was visited. Pt is a 50 y/o male with a h/o of DM x 30 years and HTN who has microalbuminuria. Pt currently is feeling well, no new or acute c/o's, no discomfort, no SOB, no leg swelling. He has mild obesity. Life style was discussed, specially with respect to lowering glycemic index and avoiding unhealthy foods. Labs and meds reviewed. Noted HGA1 C is high. Noted he takes meloxicam every day.     PAST MEDICAL HISTORY:  Microalbuminuria, Diabetes mellitus type I and Hypertension.    PAST SURGICAL HISTORY:  He  has a past surgical history that includes Cyst Removal.    SOCIAL HISTORY:  He  reports that he has never smoked. He uses smokeless tobacco. He reports that he drinks alcohol. He reports that he does not use drugs.    FAMILY MEDICAL HISTORY:  His family history includes Diabetes in his paternal grandfather and paternal grandmother.    Review of patient's allergies indicates:   Allergen Reactions    Benadryl [diphenhydramine hcl] Swelling           Prior to Admission medications    Medication Sig Start Date End Date Taking? Authorizing Provider   atorvastatin (LIPITOR) 40 MG tablet Take 1 tablet (40 mg total) by mouth once daily. 2/20/20   Rudy Cochran MD   blood sugar diagnostic Strp 1 each by Misc.(Non-Drug; Combo Route) route 4 (four) times daily. Insurance preferred 1/29/19   Ramona Munson NP   blood-glucose meter kit Use as instructed. Insurance preferred 1/29/19   Ramona Munson NP   blood-glucose meter,continuous (DEXCOM G6 ) Misc Use as directed for continuous glucose monitoring 10/18/19   Ramona Munson NP  "  blood-glucose sensor (DEXCOM G6 SENSOR) Caitie Use as directed for continuous glucose monitoring- change every 10 days 10/18/19   Ramona Munson NP   blood-glucose transmitter (DEXCOM G6 TRANSMITTER) Caitie Use as directed for continuous glucose monitoring- change every 3 months 10/18/19   Ramona Munson NP   candesartan (ATACAND) 16 MG tablet Take 1 tablet (16 mg total) by mouth once daily. 3/17/20   Rudy Cochran MD   colchicine (COLCRYS) 0.6 mg tablet Take 1 tablet (0.6 mg total) by mouth once daily. Take one tablet daily. for 6 days 4/5/19 4/11/19  Sagar Stone DPM   esomeprazole (NEXIUM) 40 MG capsule Take 1 capsule (40 mg total) by mouth once daily. 2/20/20   Rudy Cochran MD   insulin aspart U-100 (NOVOLOG FLEXPEN U-100 INSULIN) 100 unit/mL (3 mL) InPn pen Inject 10-20 units SC 3 times before meals 2/20/20   Rudy Cochran MD   insulin degludec (TRESIBA FLEXTOUCH U-200) 200 unit/mL (3 mL) InPn 60 Units by abdominal subcutaneous route every evening. 2/26/20   Rudy Cochran MD   lancets Misc 1 each by Misc.(Non-Drug; Combo Route) route 4 (four) times daily. Insurance preferred 1/29/19   Ramona Munson NP   meloxicam (MOBIC) 15 MG tablet Take 1 tablet (15 mg total) by mouth once daily. 2/20/20   Rudy Cochran MD   pen needle, diabetic 32 gauge x 5/32" Ndle 1 each by Misc.(Non-Drug; Combo Route) route once daily. 12/10/18   Ramona Munson NP   propranoloL (INDERAL [place]) 80 MG 24 hr capsule Take 1 capsule (80 mg total) by mouth once daily. 3/17/20   Rudy Cochran MD   sildenafil (REVATIO) 20 mg Tab TAKE 1 TABLET BY MOUTH THREE TIMES DAILY 11/24/19   Rudy Cochran MD        REVIEW OF SYSTEMS:  Patient has no fever, fatigue, visual changes, chest pain, edema, cough, dyspnea, nausea, vomiting, constipation, diarrhea, arthralgias, pruritis, dizziness, weakness, depression, confusion.      PHYSICAL EXAM:   vitals were not taken for this visit.   Gen: WDWN male in no apparent " distress  Psych: Normal mood and affect  Skin: No rashes or ulcers  Eyes: Normal conjunctiva and lids, PERRLA  ENT: Normal hearing with no oropharyngeal lesions  Neck: No JVD  Chest: Clear with no rales, rhonchi, wheezing with normal effort  CV: Regular with no murmurs, gallops or rubs  Abd: Soft, nontender, no distension, positive bowel sounds  Ext: No cyanosis, clubbing or edema     Labs reviewed  BMP  Lab Results   Component Value Date     02/25/2020    K 4.7 02/25/2020     02/25/2020    CO2 28 02/25/2020    BUN 12 02/25/2020    CREATININE 1.3 02/25/2020    CALCIUM 8.7 02/25/2020    ANIONGAP 8 02/25/2020    ESTGFRAFRICA >60.0 02/25/2020    EGFRNONAA >60.0 02/25/2020     Lab Results   Component Value Date    WBC 5.42 02/25/2020    HGB 17.1 02/25/2020    HCT 51.5 02/25/2020    MCV 95 02/25/2020     02/25/2020     Microalb/Cr ratio 239  HgA1C 8.4%      IMPRESSION AND RECOMMENDATIONS:  48 y/o male with metabolic syndrome presents for microalbuminuria:    1. Renal: s Cr at this point is stable, quite close to normal and unchanged  Has microalbuminuria: likely due to diabetic nephropathy  Noted microalbuminuria was previously worse (400, now 239)  On candesartan, an ARB. Continue  K normal    2. DM: uncontrolled. Elevated HgA1C  Discussed with pt    3. Life style: reviewed: physical activity encouraged.  Diety discussed  Avoiding sugars and carbohydrates encouraged    4. Med review: was done.  Noted on meloxicam daily: advised pt about NSAIds cumulative dose renal damage, specially a strong NSAID like meloxicam. Risk for analgesic nephropathy. Take only when absolutely needed.  Noted on nexium: advised pt about reports of renal damage with chronic use (chronic interstitial nephritis): take only on an as needed basis.    Plans and recommendations:  As discussed above  Opportunity for questions provided  Total time spent 45 minutes including time needed to review the records, the   patient evaluation,  this documentation, direct discussion with the patient,   more than 50% of the time was spent on coordination of care and counseling.    Level IV visit.  RTC 3 months with repeat labs    Danisha Palacio MD

## 2020-04-09 NOTE — PROGRESS NOTES
The patient location is:  Patient outside/ work  The chief complaint leading to consultation is:  Diabetes  Visit type: Virtual visit with synchronous audio and video  Total time spent with patient: 29 min    Referral was by:  Dr. Rudy Cochran    Reason for referral:    PCP: Rudy Cochran MD     Patient ID: Rob Marx is a 49 y.o. male.    Chief Complaint:    HPI    Lab Results   Component Value Date    HGBA1C 8.4 (H) 02/25/2020    HGBA1C 7.9 (H) 01/25/2019    HGBA1C 9.2 (H) 10/25/2018     DM diagnosed: AT AGE 20 Y  WEIGHT 218 TO 89  Oral medication: NONE  Insulin:   Tresiba 60 units q am ( increased from 50 in Feb per pt)  Novolog 10 units w breakfast                 12 units w supper  Frequency of CBGS: qd  CBGs: about 200  No glucose sensor  Hypoglycemia about once a week, mostly at 10 am  Waking up at 4:30 to go to work  Eye exam within last year: 10 m ago, in 2019 ? No problems; just nearsided  Kidney problem: abn lab  Pain or numbness in feet:  Occ, Burning sensation usually when it is hot outside   Taking blood pressure medication: yes  Taking cholesterol medication: yes  Following diet for diabetes: yes  Exercise: yes, walking 3 miles at least a day, not going to Gym  No complaints of  dysphagia, n/v, cp, sob, abd pain, rash, or edema.         Dipping tobacco  Partial since childhood, after a trauma    Past Medical History:   Diagnosis Date    Diabetes mellitus type I     Hypertension        Past Surgical History:   Procedure Laterality Date    CYST REMOVAL      age 18       Social History     Socioeconomic History    Marital status:      Spouse name: Not on file    Number of children: Not on file    Years of education: Not on file    Highest education level: Not on file   Occupational History    Not on file   Social Needs    Financial resource strain: Not on file    Food insecurity:     Worry: Not on file     Inability: Not on file    Transportation needs:     Medical:  Not on file     Non-medical: Not on file   Tobacco Use    Smoking status: Never Smoker    Smokeless tobacco: Current User   Substance and Sexual Activity    Alcohol use: Yes     Frequency: 4 or more times a week    Drug use: No    Sexual activity: Not on file   Lifestyle    Physical activity:     Days per week: Not on file     Minutes per session: Not on file    Stress: Not on file   Relationships    Social connections:     Talks on phone: Not on file     Gets together: Not on file     Attends Muslim service: Not on file     Active member of club or organization: Not on file     Attends meetings of clubs or organizations: Not on file     Relationship status: Not on file   Other Topics Concern    Not on file   Social History Narrative    Not on file       ROS:   Included in HPI  + Diabetes   Hypoglycemia occurred about once a week  No kidney problems  Burning sensation in feet occur sometimes  No heart problem  No complaints of chest pain or shortness breath  ROS otherwise neg except for what is mentioned in the PMH, PSH and HPI    PE:  Alert and oriented  No acute distress  No acne  No Proptosis   Scattered spots on tongue sec to tobacco dipping, + teeth  Nose nl  No goitre by inspection  No stridor and no shortness of breath  No tremors      Lab Review:     Lab Results   Component Value Date    CHOL 173 02/25/2020    TRIG 149 02/25/2020    HDL 53 02/25/2020    CHOLHDL 30.6 02/25/2020    TOTALCHOLEST 3.3 02/25/2020    NONHDLCHOL 120 02/25/2020     BMP  Lab Results   Component Value Date     02/25/2020    K 4.7 02/25/2020     02/25/2020    CO2 28 02/25/2020    BUN 12 02/25/2020    CREATININE 1.3 02/25/2020    CALCIUM 8.7 02/25/2020    ANIONGAP 8 02/25/2020    ESTGFRAFRICA >60.0 02/25/2020    EGFRNONAA >60.0 02/25/2020     Lab Results   Component Value Date    CREATRANDUR 51.0 02/25/2020    MICALBCREAT 239.2 (H) 02/25/2020       A/P  Uncontrolled type 1 diabetes mellitus with  hyperglycemia  Elevated hemoglobin A1c  Patient to continue taking for now the same insulin regimen  Diabetic nephropathy associated with type 1 diabetes mellitus  Mild chronic kidney disease  Microalbuminuria  Dyslipidemia    To check blood sugar by fingerstick 4 times a day and  CBG log to be sent to me next week    Freestyle glucose sensor vs Dexcom sensor discussed.  DEXCOM approval will be requested.  Plan for downloading of Dexcom data after use x few days.    Patient understands and agrees on the plan.

## 2020-04-14 ENCOUNTER — TELEPHONE (OUTPATIENT)
Dept: ENDOCRINOLOGY | Facility: CLINIC | Age: 50
End: 2020-04-14

## 2020-04-14 ENCOUNTER — PATIENT MESSAGE (OUTPATIENT)
Dept: ENDOCRINOLOGY | Facility: CLINIC | Age: 50
End: 2020-04-14

## 2020-04-14 NOTE — TELEPHONE ENCOUNTER
Returned call asked to call back or send message in my chart      ----- Message from Donna Maldonado sent at 4/14/2020 12:36 PM CDT -----  Contact: pt wife  Pt wife called in regards to pump and can be reached at [phone]        Thanks,  Donna Maldonado

## 2020-05-13 ENCOUNTER — TELEPHONE (OUTPATIENT)
Dept: INTERNAL MEDICINE | Facility: CLINIC | Age: 50
End: 2020-05-13

## 2020-05-14 DIAGNOSIS — N52.9 ERECTILE DYSFUNCTION, UNSPECIFIED ERECTILE DYSFUNCTION TYPE: Primary | ICD-10-CM

## 2020-05-14 RX ORDER — SILDENAFIL CITRATE 20 MG/1
20 TABLET ORAL 3 TIMES DAILY
Qty: 5 TABLET | Refills: 11 | Status: SHIPPED | OUTPATIENT
Start: 2020-05-14 | End: 2020-10-27

## 2020-07-26 ENCOUNTER — PATIENT OUTREACH (OUTPATIENT)
Dept: ADMINISTRATIVE | Facility: OTHER | Age: 50
End: 2020-07-26

## 2020-08-21 ENCOUNTER — TELEPHONE (OUTPATIENT)
Dept: INTERNAL MEDICINE | Facility: CLINIC | Age: 50
End: 2020-08-21

## 2020-08-21 NOTE — TELEPHONE ENCOUNTER
----- Message from Marci Harper sent at 8/20/2020  6:27 PM CDT -----  Regarding: Sooner Appt  Contact: Patient Spouse Antonieta  Type:  Sooner Apoointment Request    Caller is requesting a sooner appointment.  Caller declined first available appointment listed below.  Caller will not accept being placed on the waitlist and is requesting a message be sent to doctor.  Name of Caller:Patient Spouse Antonieta   When is the first available appointment? 10/01/2020   Symptoms: medication refill   Would the patient rather a call back or a response via [facility]? Call back   Best Call Back Number: [phone]  Additional Information: n/a patient was inquiring if he can get med refill snow until he can be seen by physician

## 2020-08-26 DIAGNOSIS — E10.9 TYPE 1 DIABETES MELLITUS WITHOUT COMPLICATION: ICD-10-CM

## 2020-08-26 RX ORDER — INSULIN ASPART 100 [IU]/ML
INJECTION, SOLUTION INTRAVENOUS; SUBCUTANEOUS
Qty: 15 ML | Refills: 11 | Status: SHIPPED | OUTPATIENT
Start: 2020-08-26 | End: 2021-09-16

## 2020-08-26 NOTE — TELEPHONE ENCOUNTER
----- Message from Anita Hanson sent at 8/26/2020 10:37 AM CDT -----  .Type:  RX Refill Request    Who Called:  Patient wife ( Antonieta)   Refill or New Rx: refill   RX Name and Strength: insulin aspart U-100 (NOVOLOG FLEXPEN U-100 INSULIN) 100 unit/mL (3 mL) InPn pen  How is the patient currently taking it? (ex. 1XDay):  Is this a 30 day or 90 day RX:  Preferred Pharmacy with phone number:   [facility] - [place], [place] - [address] [facility]  [address] [facility]  [place] [address]  Phone: [phone] Fax: [phone]          Local or Mail Order: local  Ordering Provider: dr. Cochran   Would the patient rather a call back or a response via [facility]?  call  Best Call Back Number: [phone]  Additional Information: n/a

## 2020-09-09 ENCOUNTER — PATIENT OUTREACH (OUTPATIENT)
Dept: ADMINISTRATIVE | Facility: HOSPITAL | Age: 50
End: 2020-09-09

## 2020-09-09 NOTE — PROGRESS NOTES
Working eye exam report; per 02- note pt goes to [facility] in [place]. I faxed eye exam request to [facility].

## 2020-09-11 DIAGNOSIS — E11.9 TYPE 2 DIABETES MELLITUS WITHOUT COMPLICATION: ICD-10-CM

## 2020-09-24 ENCOUNTER — LAB VISIT (OUTPATIENT)
Dept: LAB | Facility: HOSPITAL | Age: 50
End: 2020-09-24
Attending: PHYSICIAN ASSISTANT
Payer: COMMERCIAL

## 2020-09-24 ENCOUNTER — OFFICE VISIT (OUTPATIENT)
Dept: INTERNAL MEDICINE | Facility: CLINIC | Age: 50
End: 2020-09-24
Payer: COMMERCIAL

## 2020-09-24 VITALS
TEMPERATURE: 97 F | HEART RATE: 88 BPM | WEIGHT: 186.06 LBS | DIASTOLIC BLOOD PRESSURE: 110 MMHG | HEIGHT: 71 IN | BODY MASS INDEX: 26.05 KG/M2 | SYSTOLIC BLOOD PRESSURE: 170 MMHG

## 2020-09-24 DIAGNOSIS — E10.8 TYPE 1 DIABETES MELLITUS WITH COMPLICATION: ICD-10-CM

## 2020-09-24 DIAGNOSIS — E11.9 TYPE 2 DIABETES MELLITUS WITHOUT COMPLICATION: ICD-10-CM

## 2020-09-24 DIAGNOSIS — I10 UNCONTROLLED STAGE 2 HYPERTENSION: Primary | ICD-10-CM

## 2020-09-24 PROCEDURE — 99999 PR PBB SHADOW E&M-EST. PATIENT-LVL IV: CPT | Mod: PBBFAC,,, | Performed by: PHYSICIAN ASSISTANT

## 2020-09-24 PROCEDURE — 3008F PR BODY MASS INDEX (BMI) DOCUMENTED: ICD-10-PCS | Mod: CPTII,S$GLB,, | Performed by: PHYSICIAN ASSISTANT

## 2020-09-24 PROCEDURE — 99999 PR PBB SHADOW E&M-EST. PATIENT-LVL IV: ICD-10-PCS | Mod: PBBFAC,,, | Performed by: PHYSICIAN ASSISTANT

## 2020-09-24 PROCEDURE — 3008F BODY MASS INDEX DOCD: CPT | Mod: CPTII,S$GLB,, | Performed by: PHYSICIAN ASSISTANT

## 2020-09-24 PROCEDURE — 3077F SYST BP >= 140 MM HG: CPT | Mod: CPTII,S$GLB,, | Performed by: PHYSICIAN ASSISTANT

## 2020-09-24 PROCEDURE — 3077F PR MOST RECENT SYSTOLIC BLOOD PRESSURE >= 140 MM HG: ICD-10-PCS | Mod: CPTII,S$GLB,, | Performed by: PHYSICIAN ASSISTANT

## 2020-09-24 PROCEDURE — 3080F PR MOST RECENT DIASTOLIC BLOOD PRESSURE >= 90 MM HG: ICD-10-PCS | Mod: CPTII,S$GLB,, | Performed by: PHYSICIAN ASSISTANT

## 2020-09-24 PROCEDURE — 3052F PR MOST RECENT HEMOGLOBIN A1C LEVEL 8.0 - < 9.0%: ICD-10-PCS | Mod: CPTII,S$GLB,, | Performed by: PHYSICIAN ASSISTANT

## 2020-09-24 PROCEDURE — 3052F HG A1C>EQUAL 8.0%<EQUAL 9.0%: CPT | Mod: CPTII,S$GLB,, | Performed by: PHYSICIAN ASSISTANT

## 2020-09-24 PROCEDURE — 36415 COLL VENOUS BLD VENIPUNCTURE: CPT | Mod: PO

## 2020-09-24 PROCEDURE — 3080F DIAST BP >= 90 MM HG: CPT | Mod: CPTII,S$GLB,, | Performed by: PHYSICIAN ASSISTANT

## 2020-09-24 PROCEDURE — 99214 OFFICE O/P EST MOD 30 MIN: CPT | Mod: S$GLB,,, | Performed by: PHYSICIAN ASSISTANT

## 2020-09-24 PROCEDURE — 99214 PR OFFICE/OUTPT VISIT, EST, LEVL IV, 30-39 MIN: ICD-10-PCS | Mod: S$GLB,,, | Performed by: PHYSICIAN ASSISTANT

## 2020-09-24 PROCEDURE — 83036 HEMOGLOBIN GLYCOSYLATED A1C: CPT

## 2020-09-24 RX ORDER — BENAZEPRIL HYDROCHLORIDE 20 MG/1
20 TABLET ORAL DAILY
Qty: 30 TABLET | Refills: 11 | Status: SHIPPED | OUTPATIENT
Start: 2020-09-24 | End: 2020-10-22

## 2020-09-24 RX ORDER — PEN NEEDLE, DIABETIC 30 GX3/16"
1 NEEDLE, DISPOSABLE MISCELLANEOUS DAILY
Qty: 100 EACH | Refills: 11 | Status: SHIPPED | OUTPATIENT
Start: 2020-09-24 | End: 2022-04-22 | Stop reason: SDUPTHER

## 2020-09-24 NOTE — PROGRESS NOTES
"Subjective:       Patient ID: Rob Marx is a 50 y.o. male.    Chief Complaint: Follow-up    HPI  States he is taking 1/2 tablet of candesartan due to it making him dizzy      So his BP is pretty high today     Sugars have been uncontrolled as well   Following up with DM     despite his BP being very high, he states he feels great     Health Maintenance Due   Topic Date Due    Hepatitis C Screening  1970    Eye Exam  08/19/1982    HIV Screening  08/19/1987    TETANUS VACCINE  08/19/1990    Pneumococcal Vaccine (Medium Risk) (1 of 1 - PPSV23) 08/19/1991    Influenza Vaccine (1) 08/01/2020    Hemoglobin A1c  08/25/2020       Past Medical History:   Diagnosis Date    Diabetes mellitus type I     Hypertension        Current Outpatient Medications   Medication Sig Dispense Refill    atorvastatin (LIPITOR) 40 MG tablet Take 1 tablet (40 mg total) by mouth once daily. 90 tablet 3    blood sugar diagnostic Strp 1 each by Misc.(Non-Drug; Combo Route) route 4 (four) times daily. Insurance preferred 100 strip 11    blood-glucose meter kit Use as instructed. Insurance preferred 1 each 0    esomeprazole (NEXIUM) 40 MG capsule Take 1 capsule (40 mg total) by mouth once daily. 90 capsule 3    insulin aspart U-100 (NOVOLOG FLEXPEN U-100 INSULIN) 100 unit/mL (3 mL) InPn pen Inject 10-20 units SC 3 times before meals 15 mL 11    insulin degludec (TRESIBA FLEXTOUCH U-200) 200 unit/mL (3 mL) InPn 60 Units by abdominal subcutaneous route every evening. 15 mL 11    lancets Misc 1 each by Misc.(Non-Drug; Combo Route) route 4 (four) times daily. Insurance preferred 100 each 11    pen needle, diabetic 32 gauge x 5/32" Ndle 1 each by Misc.(Non-Drug; Combo Route) route once daily. 100 each 11    propranoloL (INDERAL [place]) 80 MG 24 hr capsule Take 1 capsule (80 mg total) by mouth once daily. 90 capsule 3    sildenafil (REVATIO) 20 mg Tab Take 1 tablet (20 mg total) by mouth 3 (three) times daily. 5 tablet 11 " "   benazepriL (LOTENSIN) 20 MG tablet Take 1 tablet (20 mg total) by mouth once daily. 30 tablet 11     No current facility-administered medications for this visit.        Review of Systems   Constitutional: Negative for fatigue, fever and unexpected weight change.   HENT: Negative for sore throat and trouble swallowing.    Respiratory: Negative for cough and shortness of breath.    Cardiovascular: Negative for chest pain.   Gastrointestinal: Negative for abdominal pain.   Skin: Negative for color change, pallor and rash.   Neurological: Positive for dizziness (when sugar gets low). Negative for light-headedness.   Hematological: Negative for adenopathy. Does not bruise/bleed easily.   All other systems reviewed and are negative.      Objective:   BP (!) 170/110   Pulse 88   Temp 97.3 °F (36.3 °C) (Temporal)   Ht 5' 11" (1.803 m)   Wt 84.4 kg (186 lb 1.1 oz)   BMI 25.95 kg/m²      Physical Exam  Constitutional:       Appearance: Normal appearance. He is normal weight.   HENT:      Head: Normocephalic and atraumatic.      Right Ear: Tympanic membrane, ear canal and external ear normal.      Left Ear: Tympanic membrane, ear canal and external ear normal.      Nose: Nose normal.      Mouth/Throat:      Mouth: Mucous membranes are moist.   Neck:      Musculoskeletal: Normal range of motion and neck supple.   Cardiovascular:      Rate and Rhythm: Normal rate and regular rhythm.      Pulses: Normal pulses.      Heart sounds: Normal heart sounds.   Pulmonary:      Effort: Pulmonary effort is normal.      Breath sounds: Normal breath sounds.   Skin:     General: Skin is warm.      Capillary Refill: Capillary refill takes less than 2 seconds.   Neurological:      General: No focal deficit present.      Mental Status: He is alert and oriented to person, place, and time.   Psychiatric:         Mood and Affect: Mood normal.         Behavior: Behavior normal.         Thought Content: Thought content normal.           Lab " "Results   Component Value Date    WBC 5.42 02/25/2020    HGB 17.1 02/25/2020    HCT 51.5 02/25/2020     02/25/2020    CHOL 173 02/25/2020    TRIG 149 02/25/2020    HDL 53 02/25/2020    ALT 29 02/25/2020    AST 28 02/25/2020     02/25/2020    K 4.7 02/25/2020     02/25/2020    CREATININE 1.3 02/25/2020    BUN 12 02/25/2020    CO2 28 02/25/2020    TSH 0.650 03/26/2019    HGBA1C 8.4 (H) 02/25/2020       Assessment:       1. Uncontrolled stage 2 hypertension    2. Type 1 diabetes mellitus with complication        Plan:   Uncontrolled stage 2 hypertension  Start benazepril 20mg then up to 40mg   ER if not working   Type 1 diabetes mellitus with complication  -     blood sugar diagnostic Strp; 1 each by Misc.(Non-Drug; Combo Route) route 4 (four) times daily. Insurance preferred  Dispense: 100 strip; Refill: 11  -     pen needle, diabetic 32 gauge x 5/32" Ndle; 1 each by Misc.(Non-Drug; Combo Route) route once daily.  Dispense: 100 each; Refill: 11    Other orders  -     benazepriL (LOTENSIN) 20 MG tablet; Take 1 tablet (20 mg total) by mouth once daily.  Dispense: 30 tablet; Refill: 11         1 week follow up due to very high BP  Then has follow up with DM management       "

## 2020-09-25 DIAGNOSIS — E11.9 TYPE 2 DIABETES MELLITUS WITHOUT COMPLICATION: ICD-10-CM

## 2020-09-25 LAB
ESTIMATED AVG GLUCOSE: 180 MG/DL (ref 68–131)
HBA1C MFR BLD HPLC: 7.9 % (ref 4–5.6)

## 2020-09-25 NOTE — PROGRESS NOTES
Here are the results of your recent labs.  We will review them in detail at your follow up visit.    Sincerely,    Rudy Cochran M.D.        If you would like to review your experience with Dr. Cochran or [facility], please follow the link below:    [email or web address]

## 2020-10-01 NOTE — PROGRESS NOTES
I called [facility] in [place] 1x and I spoke with Ruthann who informed me she will fax eye exam.

## 2020-10-06 ENCOUNTER — PATIENT MESSAGE (OUTPATIENT)
Dept: ADMINISTRATIVE | Facility: HOSPITAL | Age: 50
End: 2020-10-06

## 2020-10-22 ENCOUNTER — OFFICE VISIT (OUTPATIENT)
Dept: INTERNAL MEDICINE | Facility: CLINIC | Age: 50
End: 2020-10-22
Payer: COMMERCIAL

## 2020-10-22 DIAGNOSIS — E10.65 UNCONTROLLED TYPE 1 DIABETES MELLITUS WITH HYPERGLYCEMIA: ICD-10-CM

## 2020-10-22 DIAGNOSIS — I10 ESSENTIAL HYPERTENSION: Primary | ICD-10-CM

## 2020-10-22 PROCEDURE — 3074F SYST BP LT 130 MM HG: CPT | Mod: CPTII,S$GLB,, | Performed by: PHYSICIAN ASSISTANT

## 2020-10-22 PROCEDURE — 99999 PR PBB SHADOW E&M-EST. PATIENT-LVL IV: ICD-10-PCS | Mod: PBBFAC,,, | Performed by: PHYSICIAN ASSISTANT

## 2020-10-22 PROCEDURE — 3080F DIAST BP >= 90 MM HG: CPT | Mod: CPTII,S$GLB,, | Performed by: PHYSICIAN ASSISTANT

## 2020-10-22 PROCEDURE — 3080F PR MOST RECENT DIASTOLIC BLOOD PRESSURE >= 90 MM HG: ICD-10-PCS | Mod: CPTII,S$GLB,, | Performed by: PHYSICIAN ASSISTANT

## 2020-10-22 PROCEDURE — 3051F HG A1C>EQUAL 7.0%<8.0%: CPT | Mod: CPTII,S$GLB,, | Performed by: PHYSICIAN ASSISTANT

## 2020-10-22 PROCEDURE — 3008F BODY MASS INDEX DOCD: CPT | Mod: CPTII,S$GLB,, | Performed by: PHYSICIAN ASSISTANT

## 2020-10-22 PROCEDURE — 99999 PR PBB SHADOW E&M-EST. PATIENT-LVL IV: CPT | Mod: PBBFAC,,, | Performed by: PHYSICIAN ASSISTANT

## 2020-10-22 PROCEDURE — 3051F PR MOST RECENT HEMOGLOBIN A1C LEVEL 7.0 - < 8.0%: ICD-10-PCS | Mod: CPTII,S$GLB,, | Performed by: PHYSICIAN ASSISTANT

## 2020-10-22 PROCEDURE — 3008F PR BODY MASS INDEX (BMI) DOCUMENTED: ICD-10-PCS | Mod: CPTII,S$GLB,, | Performed by: PHYSICIAN ASSISTANT

## 2020-10-22 PROCEDURE — 99213 PR OFFICE/OUTPT VISIT, EST, LEVL III, 20-29 MIN: ICD-10-PCS | Mod: S$GLB,,, | Performed by: PHYSICIAN ASSISTANT

## 2020-10-22 PROCEDURE — 3074F PR MOST RECENT SYSTOLIC BLOOD PRESSURE < 130 MM HG: ICD-10-PCS | Mod: CPTII,S$GLB,, | Performed by: PHYSICIAN ASSISTANT

## 2020-10-22 PROCEDURE — 99213 OFFICE O/P EST LOW 20 MIN: CPT | Mod: S$GLB,,, | Performed by: PHYSICIAN ASSISTANT

## 2020-10-22 RX ORDER — BENAZEPRIL HYDROCHLORIDE 40 MG/1
40 TABLET ORAL DAILY
Qty: 90 TABLET | Refills: 3 | Status: SHIPPED | OUTPATIENT
Start: 2020-10-22 | End: 2021-10-29

## 2020-10-22 NOTE — LETTER
October 22, 2020      [facility]  [address] [place] [address]  Phone: [phone]  Fax: [phone]       Patient: Rob Marx   YOB: 1970  Date of Visit: 10/22/2020    To Whom It May Concern:    Ilene Marx  was at [facility] on 10/22/2020. She may return to work/school on 10/26/2020 with no restrictions. If you have any questions or concerns, or if I can be of further assistance, please do not hesitate to contact me.    Sincerely,    SHABNAM Coughlin/RADHA Mcgill

## 2020-10-23 VITALS
DIASTOLIC BLOOD PRESSURE: 100 MMHG | HEIGHT: 71 IN | BODY MASS INDEX: 25.52 KG/M2 | WEIGHT: 182.31 LBS | HEART RATE: 92 BPM | TEMPERATURE: 98 F | SYSTOLIC BLOOD PRESSURE: 126 MMHG

## 2020-10-23 NOTE — PROGRESS NOTES
"Subjective:       Patient ID: Rob Marx is a 50 y.o. male.    Chief Complaint: Follow-up and Hypertension    HPI     Patient comes in today for follow up   Has DM1 and hypertension     BP has not been well controlled.     Brings in logs today >75% ARE>140/90    Very stressed at work   Numbers looked good yesterday and he was off of work   Today diastolic around 100     Does get a HA when pressure is high   Health Maintenance Due   Topic Date Due    Hepatitis C Screening  1970    Eye Exam  08/19/1982    HIV Screening  08/19/1987    TETANUS VACCINE  08/19/1990    Pneumococcal Vaccine (Medium Risk) (1 of 1 - PPSV23) 08/19/1991    Influenza Vaccine (1) 08/01/2020       Past Medical History:   Diagnosis Date    Diabetes mellitus type I     Hypertension        Current Outpatient Medications   Medication Sig Dispense Refill    atorvastatin (LIPITOR) 40 MG tablet Take 1 tablet (40 mg total) by mouth once daily. 90 tablet 3    blood sugar diagnostic Strp 1 each by Misc.(Non-Drug; Combo Route) route 4 (four) times daily. Insurance preferred 100 strip 11    blood-glucose meter kit Use as instructed. Insurance preferred 1 each 0    esomeprazole (NEXIUM) 40 MG capsule Take 1 capsule (40 mg total) by mouth once daily. 90 capsule 3    insulin aspart U-100 (NOVOLOG FLEXPEN U-100 INSULIN) 100 unit/mL (3 mL) InPn pen Inject 10-20 units SC 3 times before meals 15 mL 11    insulin degludec (TRESIBA FLEXTOUCH U-200) 200 unit/mL (3 mL) InPn 60 Units by abdominal subcutaneous route every evening. 15 mL 11    lancets Misc 1 each by Misc.(Non-Drug; Combo Route) route 4 (four) times daily. Insurance preferred 100 each 11    pen needle, diabetic 32 gauge x 5/32" Ndle 1 each by Misc.(Non-Drug; Combo Route) route once daily. 100 each 11    propranoloL (INDERAL [place]) 80 MG 24 hr capsule Take 1 capsule (80 mg total) by mouth once daily. 90 capsule 3    sildenafil (REVATIO) 20 mg Tab Take 1 tablet (20 mg total) by " "mouth 3 (three) times daily. 5 tablet 11    benazepriL (LOTENSIN) 40 MG tablet Take 1 tablet (40 mg total) by mouth once daily. 90 tablet 3     No current facility-administered medications for this visit.        Review of Systems    Objective:   BP (!) 126/100   Pulse 92   Temp 98.2 °F (36.8 °C) (Temporal)   Ht 5' 11" (1.803 m)   Wt 82.7 kg (182 lb 5.1 oz)   BMI 25.43 kg/m²      Physical Exam  Constitutional:       Appearance: Normal appearance. He is normal weight.   HENT:      Head: Normocephalic and atraumatic.   Cardiovascular:      Rate and Rhythm: Normal rate and regular rhythm.   Skin:     Capillary Refill: Capillary refill takes less than 2 seconds.   Neurological:      General: No focal deficit present.      Mental Status: He is alert and oriented to person, place, and time.   Psychiatric:         Mood and Affect: Mood normal.         Behavior: Behavior normal.         Thought Content: Thought content normal.         Judgment: Judgment normal.           Lab Results   Component Value Date    WBC 5.42 02/25/2020    HGB 17.1 02/25/2020    HCT 51.5 02/25/2020     02/25/2020    CHOL 173 02/25/2020    TRIG 149 02/25/2020    HDL 53 02/25/2020    ALT 29 02/25/2020    AST 28 02/25/2020     02/25/2020    K 4.7 02/25/2020     02/25/2020    CREATININE 1.3 02/25/2020    BUN 12 02/25/2020    CO2 28 02/25/2020    TSH 0.650 03/26/2019    HGBA1C 7.9 (H) 09/24/2020       Assessment:       1. Essential hypertension    2. Uncontrolled type 1 diabetes mellitus with hyperglycemia        Plan:   Essential hypertension    Uncontrolled type 1 diabetes mellitus with hyperglycemia    Other orders  -     benazepriL (LOTENSIN) 40 MG tablet; Take 1 tablet (40 mg total) by mouth once daily.  Dispense: 90 tablet; Refill: 3      Upping medication   He has some normal numbers in the log so he needs to track pressure and symtpoms while taking a higher dose  Request a couple of days off work while we up dose which I " think is reasonable.   Ok to go back to work Monday     a1c Dec

## 2021-01-15 ENCOUNTER — HOSPITAL ENCOUNTER (OUTPATIENT)
Dept: RADIOLOGY | Facility: HOSPITAL | Age: 51
Discharge: HOME | End: 2021-01-15
Attending: PHYSICIAN ASSISTANT
Payer: COMMERCIAL

## 2021-01-15 ENCOUNTER — OFFICE VISIT (OUTPATIENT)
Dept: INTERNAL MEDICINE | Facility: CLINIC | Age: 51
End: 2021-01-15
Payer: COMMERCIAL

## 2021-01-15 VITALS
DIASTOLIC BLOOD PRESSURE: 96 MMHG | WEIGHT: 183.88 LBS | HEIGHT: 71 IN | TEMPERATURE: 99 F | HEART RATE: 70 BPM | SYSTOLIC BLOOD PRESSURE: 152 MMHG | BODY MASS INDEX: 25.74 KG/M2

## 2021-01-15 DIAGNOSIS — M79.641 BILATERAL HAND PAIN: Primary | ICD-10-CM

## 2021-01-15 DIAGNOSIS — M79.642 BILATERAL HAND PAIN: Primary | ICD-10-CM

## 2021-01-15 DIAGNOSIS — M79.642 BILATERAL HAND PAIN: ICD-10-CM

## 2021-01-15 DIAGNOSIS — E10.65 UNCONTROLLED TYPE 1 DIABETES MELLITUS WITH HYPERGLYCEMIA: ICD-10-CM

## 2021-01-15 DIAGNOSIS — M79.641 BILATERAL HAND PAIN: ICD-10-CM

## 2021-01-15 PROCEDURE — 3008F PR BODY MASS INDEX (BMI) DOCUMENTED: ICD-10-PCS | Mod: CPTII,S$GLB,, | Performed by: PHYSICIAN ASSISTANT

## 2021-01-15 PROCEDURE — 99213 PR OFFICE/OUTPT VISIT, EST, LEVL III, 20-29 MIN: ICD-10-PCS | Mod: S$GLB,,, | Performed by: PHYSICIAN ASSISTANT

## 2021-01-15 PROCEDURE — 3008F BODY MASS INDEX DOCD: CPT | Mod: CPTII,S$GLB,, | Performed by: PHYSICIAN ASSISTANT

## 2021-01-15 PROCEDURE — 73130 X-RAY EXAM OF HAND: CPT | Mod: 26,,, | Performed by: RADIOLOGY

## 2021-01-15 PROCEDURE — 1126F AMNT PAIN NOTED NONE PRSNT: CPT | Mod: S$GLB,,, | Performed by: PHYSICIAN ASSISTANT

## 2021-01-15 PROCEDURE — 3077F PR MOST RECENT SYSTOLIC BLOOD PRESSURE >= 140 MM HG: ICD-10-PCS | Mod: CPTII,S$GLB,, | Performed by: PHYSICIAN ASSISTANT

## 2021-01-15 PROCEDURE — 99213 OFFICE O/P EST LOW 20 MIN: CPT | Mod: S$GLB,,, | Performed by: PHYSICIAN ASSISTANT

## 2021-01-15 PROCEDURE — 3051F HG A1C>EQUAL 7.0%<8.0%: CPT | Mod: CPTII,S$GLB,, | Performed by: PHYSICIAN ASSISTANT

## 2021-01-15 PROCEDURE — 73130 XR HAND COMPLETE 3 VIEWS BILATERAL: ICD-10-PCS | Mod: 26,,, | Performed by: RADIOLOGY

## 2021-01-15 PROCEDURE — 99999 PR PBB SHADOW E&M-EST. PATIENT-LVL IV: CPT | Mod: PBBFAC,,, | Performed by: PHYSICIAN ASSISTANT

## 2021-01-15 PROCEDURE — 3080F PR MOST RECENT DIASTOLIC BLOOD PRESSURE >= 90 MM HG: ICD-10-PCS | Mod: CPTII,S$GLB,, | Performed by: PHYSICIAN ASSISTANT

## 2021-01-15 PROCEDURE — 1126F PR PAIN SEVERITY QUANTIFIED, NO PAIN PRESENT: ICD-10-PCS | Mod: S$GLB,,, | Performed by: PHYSICIAN ASSISTANT

## 2021-01-15 PROCEDURE — 73130 X-RAY EXAM OF HAND: CPT | Mod: TC,50,FY,PO

## 2021-01-15 PROCEDURE — 3080F DIAST BP >= 90 MM HG: CPT | Mod: CPTII,S$GLB,, | Performed by: PHYSICIAN ASSISTANT

## 2021-01-15 PROCEDURE — 3077F SYST BP >= 140 MM HG: CPT | Mod: CPTII,S$GLB,, | Performed by: PHYSICIAN ASSISTANT

## 2021-01-15 PROCEDURE — 99999 PR PBB SHADOW E&M-EST. PATIENT-LVL IV: ICD-10-PCS | Mod: PBBFAC,,, | Performed by: PHYSICIAN ASSISTANT

## 2021-01-15 PROCEDURE — 3051F PR MOST RECENT HEMOGLOBIN A1C LEVEL 7.0 - < 8.0%: ICD-10-PCS | Mod: CPTII,S$GLB,, | Performed by: PHYSICIAN ASSISTANT

## 2021-01-17 ENCOUNTER — PATIENT MESSAGE (OUTPATIENT)
Dept: INTERNAL MEDICINE | Facility: CLINIC | Age: 51
End: 2021-01-17

## 2021-03-03 DIAGNOSIS — E10.9 TYPE 1 DIABETES MELLITUS WITHOUT COMPLICATION: ICD-10-CM

## 2021-03-03 RX ORDER — INSULIN DEGLUDEC 200 U/ML
INJECTION, SOLUTION SUBCUTANEOUS
Qty: 9 ML | Refills: 0 | Status: SHIPPED | OUTPATIENT
Start: 2021-03-03 | End: 2021-03-03 | Stop reason: SDUPTHER

## 2021-03-04 RX ORDER — INSULIN DEGLUDEC 200 U/ML
INJECTION, SOLUTION SUBCUTANEOUS
Qty: 9 ML | Refills: 0 | Status: SHIPPED | OUTPATIENT
Start: 2021-03-04 | End: 2021-05-06

## 2021-03-11 ENCOUNTER — OFFICE VISIT (OUTPATIENT)
Dept: INTERNAL MEDICINE | Facility: CLINIC | Age: 51
End: 2021-03-11
Payer: COMMERCIAL

## 2021-03-11 VITALS
TEMPERATURE: 99 F | SYSTOLIC BLOOD PRESSURE: 136 MMHG | HEIGHT: 71 IN | DIASTOLIC BLOOD PRESSURE: 92 MMHG | WEIGHT: 182.56 LBS | BODY MASS INDEX: 25.56 KG/M2 | HEART RATE: 86 BPM

## 2021-03-11 DIAGNOSIS — M79.642 HAND PAIN, LEFT: ICD-10-CM

## 2021-03-11 DIAGNOSIS — E10.21 DIABETIC NEPHROPATHY ASSOCIATED WITH TYPE 1 DIABETES MELLITUS: ICD-10-CM

## 2021-03-11 DIAGNOSIS — E10.65 UNCONTROLLED TYPE 1 DIABETES MELLITUS WITH HYPERGLYCEMIA: Primary | ICD-10-CM

## 2021-03-11 PROCEDURE — 99999 PR PBB SHADOW E&M-EST. PATIENT-LVL V: CPT | Mod: PBBFAC,,, | Performed by: PHYSICIAN ASSISTANT

## 2021-03-11 PROCEDURE — 99214 PR OFFICE/OUTPT VISIT, EST, LEVL IV, 30-39 MIN: ICD-10-PCS | Mod: S$GLB,,, | Performed by: PHYSICIAN ASSISTANT

## 2021-03-11 PROCEDURE — 81001 URINALYSIS AUTO W/SCOPE: CPT | Performed by: PHYSICIAN ASSISTANT

## 2021-03-11 PROCEDURE — 1125F AMNT PAIN NOTED PAIN PRSNT: CPT | Mod: S$GLB,,, | Performed by: PHYSICIAN ASSISTANT

## 2021-03-11 PROCEDURE — 1125F PR PAIN SEVERITY QUANTIFIED, PAIN PRESENT: ICD-10-PCS | Mod: S$GLB,,, | Performed by: PHYSICIAN ASSISTANT

## 2021-03-11 PROCEDURE — 99999 PR PBB SHADOW E&M-EST. PATIENT-LVL V: ICD-10-PCS | Mod: PBBFAC,,, | Performed by: PHYSICIAN ASSISTANT

## 2021-03-11 PROCEDURE — 3075F SYST BP GE 130 - 139MM HG: CPT | Mod: CPTII,S$GLB,, | Performed by: PHYSICIAN ASSISTANT

## 2021-03-11 PROCEDURE — 3075F PR MOST RECENT SYSTOLIC BLOOD PRESS GE 130-139MM HG: ICD-10-PCS | Mod: CPTII,S$GLB,, | Performed by: PHYSICIAN ASSISTANT

## 2021-03-11 PROCEDURE — 3008F BODY MASS INDEX DOCD: CPT | Mod: CPTII,S$GLB,, | Performed by: PHYSICIAN ASSISTANT

## 2021-03-11 PROCEDURE — 3051F HG A1C>EQUAL 7.0%<8.0%: CPT | Mod: CPTII,S$GLB,, | Performed by: PHYSICIAN ASSISTANT

## 2021-03-11 PROCEDURE — 3008F PR BODY MASS INDEX (BMI) DOCUMENTED: ICD-10-PCS | Mod: CPTII,S$GLB,, | Performed by: PHYSICIAN ASSISTANT

## 2021-03-11 PROCEDURE — 99214 OFFICE O/P EST MOD 30 MIN: CPT | Mod: S$GLB,,, | Performed by: PHYSICIAN ASSISTANT

## 2021-03-11 PROCEDURE — 3080F PR MOST RECENT DIASTOLIC BLOOD PRESSURE >= 90 MM HG: ICD-10-PCS | Mod: CPTII,S$GLB,, | Performed by: PHYSICIAN ASSISTANT

## 2021-03-11 PROCEDURE — 3051F PR MOST RECENT HEMOGLOBIN A1C LEVEL 7.0 - < 8.0%: ICD-10-PCS | Mod: CPTII,S$GLB,, | Performed by: PHYSICIAN ASSISTANT

## 2021-03-11 PROCEDURE — 3080F DIAST BP >= 90 MM HG: CPT | Mod: CPTII,S$GLB,, | Performed by: PHYSICIAN ASSISTANT

## 2021-03-12 ENCOUNTER — TELEPHONE (OUTPATIENT)
Dept: ORTHOPEDICS | Facility: CLINIC | Age: 51
End: 2021-03-12

## 2021-03-12 ENCOUNTER — TELEPHONE (OUTPATIENT)
Dept: DIABETES | Facility: CLINIC | Age: 51
End: 2021-03-12

## 2021-03-12 LAB
BACTERIA #/AREA URNS AUTO: NORMAL /HPF
BILIRUB UR QL STRIP: NEGATIVE
CLARITY UR REFRACT.AUTO: CLEAR
COLOR UR AUTO: YELLOW
GLUCOSE UR QL STRIP: NEGATIVE
HGB UR QL STRIP: NEGATIVE
HYALINE CASTS UR QL AUTO: 0 /LPF
KETONES UR QL STRIP: NEGATIVE
LEUKOCYTE ESTERASE UR QL STRIP: NEGATIVE
MICROSCOPIC COMMENT: NORMAL
NITRITE UR QL STRIP: NEGATIVE
PH UR STRIP: 5 [PH] (ref 5–8)
PROT UR QL STRIP: ABNORMAL
RBC #/AREA URNS AUTO: 0 /HPF (ref 0–4)
SP GR UR STRIP: 1.01 (ref 1–1.03)
SQUAMOUS #/AREA URNS AUTO: 0 /HPF
URN SPEC COLLECT METH UR: ABNORMAL
WBC #/AREA URNS AUTO: 1 /HPF (ref 0–5)
YEAST UR QL AUTO: NORMAL

## 2021-03-15 ENCOUNTER — TELEPHONE (OUTPATIENT)
Dept: ORTHOPEDICS | Facility: CLINIC | Age: 51
End: 2021-03-15

## 2021-03-16 ENCOUNTER — TELEPHONE (OUTPATIENT)
Dept: ORTHOPEDICS | Facility: CLINIC | Age: 51
End: 2021-03-16

## 2021-03-26 ENCOUNTER — TELEPHONE (OUTPATIENT)
Dept: DIABETES | Facility: CLINIC | Age: 51
End: 2021-03-26

## 2021-03-31 ENCOUNTER — TELEPHONE (OUTPATIENT)
Dept: DIABETES | Facility: CLINIC | Age: 51
End: 2021-03-31

## 2021-04-07 ENCOUNTER — PATIENT OUTREACH (OUTPATIENT)
Dept: ADMINISTRATIVE | Facility: HOSPITAL | Age: 51
End: 2021-04-07

## 2021-04-08 DIAGNOSIS — K21.9 GASTROESOPHAGEAL REFLUX DISEASE WITHOUT ESOPHAGITIS: ICD-10-CM

## 2021-04-08 DIAGNOSIS — R25.1 TREMOR: ICD-10-CM

## 2021-04-08 RX ORDER — PROPRANOLOL HYDROCHLORIDE 80 MG/1
CAPSULE, EXTENDED RELEASE ORAL
Qty: 90 CAPSULE | Refills: 0 | Status: SHIPPED | OUTPATIENT
Start: 2021-04-08 | End: 2021-07-27

## 2021-04-08 RX ORDER — ESOMEPRAZOLE MAGNESIUM 40 MG/1
CAPSULE, DELAYED RELEASE ORAL
Qty: 90 CAPSULE | Refills: 0 | Status: SHIPPED | OUTPATIENT
Start: 2021-04-08 | End: 2021-06-30

## 2021-04-28 ENCOUNTER — PATIENT MESSAGE (OUTPATIENT)
Dept: RESEARCH | Facility: HOSPITAL | Age: 51
End: 2021-04-28

## 2021-06-29 DIAGNOSIS — K21.9 GASTROESOPHAGEAL REFLUX DISEASE WITHOUT ESOPHAGITIS: ICD-10-CM

## 2021-06-30 RX ORDER — ESOMEPRAZOLE MAGNESIUM 40 MG/1
CAPSULE, DELAYED RELEASE ORAL
Qty: 90 CAPSULE | Refills: 0 | Status: SHIPPED | OUTPATIENT
Start: 2021-06-30 | End: 2021-08-15

## 2021-07-13 ENCOUNTER — OFFICE VISIT (OUTPATIENT)
Dept: INTERNAL MEDICINE | Facility: CLINIC | Age: 51
End: 2021-07-13
Payer: COMMERCIAL

## 2021-07-13 ENCOUNTER — LAB VISIT (OUTPATIENT)
Dept: LAB | Facility: HOSPITAL | Age: 51
End: 2021-07-13
Attending: PHYSICIAN ASSISTANT
Payer: COMMERCIAL

## 2021-07-13 VITALS
TEMPERATURE: 98 F | HEIGHT: 71 IN | HEART RATE: 70 BPM | SYSTOLIC BLOOD PRESSURE: 138 MMHG | WEIGHT: 181.44 LBS | BODY MASS INDEX: 25.4 KG/M2 | DIASTOLIC BLOOD PRESSURE: 84 MMHG

## 2021-07-13 DIAGNOSIS — E10.8 TYPE 1 DIABETES MELLITUS WITH COMPLICATION: Primary | ICD-10-CM

## 2021-07-13 DIAGNOSIS — E10.8 TYPE 1 DIABETES MELLITUS WITH COMPLICATION: ICD-10-CM

## 2021-07-13 DIAGNOSIS — R10.9 LEFT FLANK PAIN: ICD-10-CM

## 2021-07-13 LAB
BACTERIA #/AREA URNS AUTO: NORMAL /HPF
BASOPHILS # BLD AUTO: 0.04 K/UL (ref 0–0.2)
BASOPHILS NFR BLD: 0.6 % (ref 0–1.9)
BILIRUB UR QL STRIP: NEGATIVE
CLARITY UR REFRACT.AUTO: CLEAR
COLOR UR AUTO: YELLOW
DIFFERENTIAL METHOD: ABNORMAL
EOSINOPHIL # BLD AUTO: 0.1 K/UL (ref 0–0.5)
EOSINOPHIL NFR BLD: 1.9 % (ref 0–8)
ERYTHROCYTE [DISTWIDTH] IN BLOOD BY AUTOMATED COUNT: 13.9 % (ref 11.5–14.5)
GLUCOSE UR QL STRIP: ABNORMAL
HCT VFR BLD AUTO: 49.1 % (ref 40–54)
HGB BLD-MCNC: 16.7 G/DL (ref 14–18)
HGB UR QL STRIP: NEGATIVE
HYALINE CASTS UR QL AUTO: 0 /LPF
IMM GRANULOCYTES # BLD AUTO: 0.01 K/UL (ref 0–0.04)
IMM GRANULOCYTES NFR BLD AUTO: 0.1 % (ref 0–0.5)
KETONES UR QL STRIP: NEGATIVE
LEUKOCYTE ESTERASE UR QL STRIP: NEGATIVE
LYMPHOCYTES # BLD AUTO: 1.9 K/UL (ref 1–4.8)
LYMPHOCYTES NFR BLD: 28 % (ref 18–48)
MCH RBC QN AUTO: 31.8 PG (ref 27–31)
MCHC RBC AUTO-ENTMCNC: 34 G/DL (ref 32–36)
MCV RBC AUTO: 94 FL (ref 82–98)
MICROSCOPIC COMMENT: NORMAL
MONOCYTES # BLD AUTO: 0.5 K/UL (ref 0.3–1)
MONOCYTES NFR BLD: 7.1 % (ref 4–15)
NEUTROPHILS # BLD AUTO: 4.3 K/UL (ref 1.8–7.7)
NEUTROPHILS NFR BLD: 62.3 % (ref 38–73)
NITRITE UR QL STRIP: NEGATIVE
NRBC BLD-RTO: 0 /100 WBC
PH UR STRIP: 5 [PH] (ref 5–8)
PLATELET # BLD AUTO: 213 K/UL (ref 150–450)
PMV BLD AUTO: 10.7 FL (ref 9.2–12.9)
PROT UR QL STRIP: ABNORMAL
RBC # BLD AUTO: 5.25 M/UL (ref 4.6–6.2)
RBC #/AREA URNS AUTO: 0 /HPF (ref 0–4)
SP GR UR STRIP: 1.02 (ref 1–1.03)
URN SPEC COLLECT METH UR: ABNORMAL
WBC # BLD AUTO: 6.86 K/UL (ref 3.9–12.7)
WBC #/AREA URNS AUTO: 0 /HPF (ref 0–5)
YEAST UR QL AUTO: NORMAL

## 2021-07-13 PROCEDURE — 90471 IMMUNIZATION ADMIN: CPT | Mod: S$GLB,,, | Performed by: PHYSICIAN ASSISTANT

## 2021-07-13 PROCEDURE — 3008F PR BODY MASS INDEX (BMI) DOCUMENTED: ICD-10-PCS | Mod: CPTII,S$GLB,, | Performed by: PHYSICIAN ASSISTANT

## 2021-07-13 PROCEDURE — 90471 PNEUMOCOCCAL POLYSACCHARIDE VACCINE 23-VALENT =>2YO SQ IM: ICD-10-PCS | Mod: S$GLB,,, | Performed by: PHYSICIAN ASSISTANT

## 2021-07-13 PROCEDURE — 80053 COMPREHEN METABOLIC PANEL: CPT | Performed by: PHYSICIAN ASSISTANT

## 2021-07-13 PROCEDURE — 99999 PR PBB SHADOW E&M-EST. PATIENT-LVL IV: ICD-10-PCS | Mod: PBBFAC,,, | Performed by: PHYSICIAN ASSISTANT

## 2021-07-13 PROCEDURE — 3051F HG A1C>EQUAL 7.0%<8.0%: CPT | Mod: CPTII,S$GLB,, | Performed by: PHYSICIAN ASSISTANT

## 2021-07-13 PROCEDURE — 82043 UR ALBUMIN QUANTITATIVE: CPT | Performed by: PHYSICIAN ASSISTANT

## 2021-07-13 PROCEDURE — 99214 PR OFFICE/OUTPT VISIT, EST, LEVL IV, 30-39 MIN: ICD-10-PCS | Mod: 25,S$GLB,, | Performed by: PHYSICIAN ASSISTANT

## 2021-07-13 PROCEDURE — 81001 URINALYSIS AUTO W/SCOPE: CPT | Performed by: PHYSICIAN ASSISTANT

## 2021-07-13 PROCEDURE — 99214 OFFICE O/P EST MOD 30 MIN: CPT | Mod: 25,S$GLB,, | Performed by: PHYSICIAN ASSISTANT

## 2021-07-13 PROCEDURE — 85025 COMPLETE CBC W/AUTO DIFF WBC: CPT | Performed by: PHYSICIAN ASSISTANT

## 2021-07-13 PROCEDURE — 83036 HEMOGLOBIN GLYCOSYLATED A1C: CPT | Performed by: PHYSICIAN ASSISTANT

## 2021-07-13 PROCEDURE — 3051F PR MOST RECENT HEMOGLOBIN A1C LEVEL 7.0 - < 8.0%: ICD-10-PCS | Mod: CPTII,S$GLB,, | Performed by: PHYSICIAN ASSISTANT

## 2021-07-13 PROCEDURE — 90732 PPSV23 VACC 2 YRS+ SUBQ/IM: CPT | Mod: S$GLB,,, | Performed by: PHYSICIAN ASSISTANT

## 2021-07-13 PROCEDURE — 3008F BODY MASS INDEX DOCD: CPT | Mod: CPTII,S$GLB,, | Performed by: PHYSICIAN ASSISTANT

## 2021-07-13 PROCEDURE — 1125F AMNT PAIN NOTED PAIN PRSNT: CPT | Mod: S$GLB,,, | Performed by: PHYSICIAN ASSISTANT

## 2021-07-13 PROCEDURE — 90732 PNEUMOCOCCAL POLYSACCHARIDE VACCINE 23-VALENT =>2YO SQ IM: ICD-10-PCS | Mod: S$GLB,,, | Performed by: PHYSICIAN ASSISTANT

## 2021-07-13 PROCEDURE — 99999 PR PBB SHADOW E&M-EST. PATIENT-LVL IV: CPT | Mod: PBBFAC,,, | Performed by: PHYSICIAN ASSISTANT

## 2021-07-13 PROCEDURE — 82570 ASSAY OF URINE CREATININE: CPT | Performed by: PHYSICIAN ASSISTANT

## 2021-07-13 PROCEDURE — 1125F PR PAIN SEVERITY QUANTIFIED, PAIN PRESENT: ICD-10-PCS | Mod: S$GLB,,, | Performed by: PHYSICIAN ASSISTANT

## 2021-07-13 PROCEDURE — 36415 COLL VENOUS BLD VENIPUNCTURE: CPT | Mod: PO | Performed by: PHYSICIAN ASSISTANT

## 2021-07-13 RX ORDER — FLASH GLUCOSE SENSOR
KIT MISCELLANEOUS
Qty: 1 KIT | Refills: 6 | Status: SHIPPED | OUTPATIENT
Start: 2021-07-13 | End: 2022-03-14

## 2021-07-13 RX ORDER — FLASH GLUCOSE SCANNING READER
EACH MISCELLANEOUS
Qty: 1 EACH | Refills: 6 | Status: SHIPPED | OUTPATIENT
Start: 2021-07-13 | End: 2022-03-14

## 2021-07-14 LAB
ALBUMIN SERPL BCP-MCNC: 4 G/DL (ref 3.5–5.2)
ALBUMIN/CREAT UR: 122.4 UG/MG (ref 0–30)
ALP SERPL-CCNC: 85 U/L (ref 55–135)
ALT SERPL W/O P-5'-P-CCNC: 22 U/L (ref 10–44)
ANION GAP SERPL CALC-SCNC: 14 MMOL/L (ref 8–16)
AST SERPL-CCNC: 19 U/L (ref 10–40)
BILIRUB SERPL-MCNC: 0.7 MG/DL (ref 0.1–1)
BUN SERPL-MCNC: 16 MG/DL (ref 6–20)
CALCIUM SERPL-MCNC: 9.9 MG/DL (ref 8.7–10.5)
CHLORIDE SERPL-SCNC: 102 MMOL/L (ref 95–110)
CO2 SERPL-SCNC: 20 MMOL/L (ref 23–29)
CREAT SERPL-MCNC: 1.5 MG/DL (ref 0.5–1.4)
CREAT UR-MCNC: 134 MG/DL (ref 23–375)
EST. GFR  (AFRICAN AMERICAN): >60 ML/MIN/1.73 M^2
EST. GFR  (NON AFRICAN AMERICAN): 54.3 ML/MIN/1.73 M^2
ESTIMATED AVG GLUCOSE: 186 MG/DL (ref 68–131)
GLUCOSE SERPL-MCNC: 219 MG/DL (ref 70–110)
HBA1C MFR BLD: 8.1 % (ref 4–5.6)
MICROALBUMIN UR DL<=1MG/L-MCNC: 164 UG/ML
POTASSIUM SERPL-SCNC: 5.2 MMOL/L (ref 3.5–5.1)
PROT SERPL-MCNC: 7.2 G/DL (ref 6–8.4)
SODIUM SERPL-SCNC: 136 MMOL/L (ref 136–145)

## 2021-07-27 DIAGNOSIS — N52.9 ERECTILE DYSFUNCTION, UNSPECIFIED ERECTILE DYSFUNCTION TYPE: ICD-10-CM

## 2021-07-27 RX ORDER — SILDENAFIL CITRATE 20 MG/1
20 TABLET ORAL 3 TIMES DAILY
Qty: 10 TABLET | Refills: 11 | Status: SHIPPED | OUTPATIENT
Start: 2021-07-27 | End: 2021-10-29 | Stop reason: SDUPTHER

## 2021-08-03 ENCOUNTER — OFFICE VISIT (OUTPATIENT)
Dept: OPHTHALMOLOGY | Facility: CLINIC | Age: 51
End: 2021-08-03
Payer: COMMERCIAL

## 2021-08-03 DIAGNOSIS — E10.3592 TYPE 1 DIABETES MELLITUS WITH PROLIFERATIVE RETINOPATHY OF LEFT EYE WITHOUT MACULAR EDEMA: Primary | ICD-10-CM

## 2021-08-03 DIAGNOSIS — H52.13 MYOPIA OF BOTH EYES: ICD-10-CM

## 2021-08-03 DIAGNOSIS — E10.3391 TYPE 1 DIABETES MELLITUS WITH MODERATE NONPROLIFERATIVE RETINOPATHY OF RIGHT EYE WITHOUT MACULAR EDEMA: ICD-10-CM

## 2021-08-03 DIAGNOSIS — E10.8 TYPE 1 DIABETES MELLITUS WITH COMPLICATION: ICD-10-CM

## 2021-08-03 PROCEDURE — 92015 PR REFRACTION: ICD-10-PCS | Mod: S$GLB,,, | Performed by: OPTOMETRIST

## 2021-08-03 PROCEDURE — 92134 OCT, RETINA - OU - BOTH EYES: ICD-10-PCS | Mod: S$GLB,,, | Performed by: OPTOMETRIST

## 2021-08-03 PROCEDURE — 92134 CPTRZ OPH DX IMG PST SGM RTA: CPT | Mod: S$GLB,,, | Performed by: OPTOMETRIST

## 2021-08-03 PROCEDURE — 1159F PR MEDICATION LIST DOCUMENTED IN MEDICAL RECORD: ICD-10-PCS | Mod: CPTII,S$GLB,, | Performed by: OPTOMETRIST

## 2021-08-03 PROCEDURE — 3052F HG A1C>EQUAL 8.0%<EQUAL 9.0%: CPT | Mod: CPTII,S$GLB,, | Performed by: OPTOMETRIST

## 2021-08-03 PROCEDURE — 99999 PR PBB SHADOW E&M-EST. PATIENT-LVL III: ICD-10-PCS | Mod: PBBFAC,,, | Performed by: OPTOMETRIST

## 2021-08-03 PROCEDURE — 3052F PR MOST RECENT HEMOGLOBIN A1C LEVEL 8.0 - < 9.0%: ICD-10-PCS | Mod: CPTII,S$GLB,, | Performed by: OPTOMETRIST

## 2021-08-03 PROCEDURE — 92004 PR EYE EXAM, NEW PATIENT,COMPREHESV: ICD-10-PCS | Mod: S$GLB,,, | Performed by: OPTOMETRIST

## 2021-08-03 PROCEDURE — 92015 DETERMINE REFRACTIVE STATE: CPT | Mod: S$GLB,,, | Performed by: OPTOMETRIST

## 2021-08-03 PROCEDURE — 92004 COMPRE OPH EXAM NEW PT 1/>: CPT | Mod: S$GLB,,, | Performed by: OPTOMETRIST

## 2021-08-03 PROCEDURE — 99999 PR PBB SHADOW E&M-EST. PATIENT-LVL III: CPT | Mod: PBBFAC,,, | Performed by: OPTOMETRIST

## 2021-08-03 PROCEDURE — 1159F MED LIST DOCD IN RCRD: CPT | Mod: CPTII,S$GLB,, | Performed by: OPTOMETRIST

## 2021-08-15 DIAGNOSIS — K21.9 GASTROESOPHAGEAL REFLUX DISEASE WITHOUT ESOPHAGITIS: ICD-10-CM

## 2021-08-15 RX ORDER — ESOMEPRAZOLE MAGNESIUM 40 MG/1
CAPSULE, DELAYED RELEASE ORAL
Qty: 90 CAPSULE | Refills: 0 | Status: SHIPPED | OUTPATIENT
Start: 2021-08-15 | End: 2021-12-19

## 2021-09-12 ENCOUNTER — PATIENT OUTREACH (OUTPATIENT)
Dept: ADMINISTRATIVE | Facility: OTHER | Age: 51
End: 2021-09-12

## 2021-09-13 DIAGNOSIS — N52.9 ERECTILE DYSFUNCTION, UNSPECIFIED ERECTILE DYSFUNCTION TYPE: ICD-10-CM

## 2021-09-15 DIAGNOSIS — E10.9 TYPE 1 DIABETES MELLITUS WITHOUT COMPLICATION: ICD-10-CM

## 2021-09-16 RX ORDER — INSULIN ASPART 100 [IU]/ML
INJECTION, SOLUTION INTRAVENOUS; SUBCUTANEOUS
Qty: 15 ML | Refills: 0 | Status: SHIPPED | OUTPATIENT
Start: 2021-09-16 | End: 2022-01-07

## 2021-09-30 ENCOUNTER — TELEPHONE (OUTPATIENT)
Dept: INTERNAL MEDICINE | Facility: CLINIC | Age: 51
End: 2021-09-30

## 2021-10-29 RX ORDER — BENAZEPRIL HYDROCHLORIDE 40 MG/1
TABLET ORAL
Qty: 90 TABLET | Refills: 0 | Status: SHIPPED | OUTPATIENT
Start: 2021-10-29 | End: 2022-02-23

## 2021-10-29 RX ORDER — SILDENAFIL CITRATE 20 MG/1
20 TABLET ORAL 3 TIMES DAILY
Qty: 30 TABLET | Refills: 11 | Status: SHIPPED | OUTPATIENT
Start: 2021-10-29 | End: 2022-01-13 | Stop reason: SDUPTHER

## 2021-11-05 ENCOUNTER — TELEPHONE (OUTPATIENT)
Dept: ORTHOPEDICS | Facility: CLINIC | Age: 51
End: 2021-11-05
Payer: COMMERCIAL

## 2021-11-05 DIAGNOSIS — M79.641 BILATERAL HAND PAIN: Primary | ICD-10-CM

## 2021-11-05 DIAGNOSIS — M79.642 BILATERAL HAND PAIN: Primary | ICD-10-CM

## 2021-11-20 ENCOUNTER — PATIENT OUTREACH (OUTPATIENT)
Dept: ADMINISTRATIVE | Facility: OTHER | Age: 51
End: 2021-11-20
Payer: COMMERCIAL

## 2021-11-23 ENCOUNTER — OFFICE VISIT (OUTPATIENT)
Dept: ORTHOPEDICS | Facility: CLINIC | Age: 51
End: 2021-11-23
Payer: COMMERCIAL

## 2021-11-23 ENCOUNTER — HOSPITAL ENCOUNTER (OUTPATIENT)
Dept: RADIOLOGY | Facility: HOSPITAL | Age: 51
Discharge: HOME | End: 2021-11-23
Attending: ORTHOPAEDIC SURGERY
Payer: COMMERCIAL

## 2021-11-23 VITALS
BODY MASS INDEX: 25.34 KG/M2 | DIASTOLIC BLOOD PRESSURE: 108 MMHG | HEART RATE: 100 BPM | HEIGHT: 71 IN | WEIGHT: 181 LBS | SYSTOLIC BLOOD PRESSURE: 173 MMHG

## 2021-11-23 DIAGNOSIS — M65.30 TRIGGER FINGER, ACQUIRED: Primary | ICD-10-CM

## 2021-11-23 DIAGNOSIS — M79.642 BILATERAL HAND PAIN: ICD-10-CM

## 2021-11-23 DIAGNOSIS — M79.641 BILATERAL HAND PAIN: ICD-10-CM

## 2021-11-23 PROCEDURE — 99203 PR OFFICE/OUTPT VISIT, NEW, LEVL III, 30-44 MIN: ICD-10-PCS | Mod: 25,S$GLB,, | Performed by: ORTHOPAEDIC SURGERY

## 2021-11-23 PROCEDURE — 99203 OFFICE O/P NEW LOW 30 MIN: CPT | Mod: 25,S$GLB,, | Performed by: ORTHOPAEDIC SURGERY

## 2021-11-23 PROCEDURE — 99999 PR PBB SHADOW E&M-EST. PATIENT-LVL III: ICD-10-PCS | Mod: PBBFAC,,, | Performed by: ORTHOPAEDIC SURGERY

## 2021-11-23 PROCEDURE — 73130 XR HAND COMPLETE 3 VIEWS BILATERAL: ICD-10-PCS | Mod: 26,,, | Performed by: RADIOLOGY

## 2021-11-23 PROCEDURE — 4010F PR ACE/ARB THEARPY RXD/TAKEN: ICD-10-PCS | Mod: CPTII,S$GLB,, | Performed by: ORTHOPAEDIC SURGERY

## 2021-11-23 PROCEDURE — 73130 X-RAY EXAM OF HAND: CPT | Mod: 26,,, | Performed by: RADIOLOGY

## 2021-11-23 PROCEDURE — 3066F PR DOCUMENTATION OF TREATMENT FOR NEPHROPATHY: ICD-10-PCS | Mod: CPTII,S$GLB,, | Performed by: ORTHOPAEDIC SURGERY

## 2021-11-23 PROCEDURE — 99999 PR PBB SHADOW E&M-EST. PATIENT-LVL III: CPT | Mod: PBBFAC,,, | Performed by: ORTHOPAEDIC SURGERY

## 2021-11-23 PROCEDURE — 3066F NEPHROPATHY DOC TX: CPT | Mod: CPTII,S$GLB,, | Performed by: ORTHOPAEDIC SURGERY

## 2021-11-23 PROCEDURE — 20550 NJX 1 TENDON SHEATH/LIGAMENT: CPT | Mod: F2,S$GLB,, | Performed by: ORTHOPAEDIC SURGERY

## 2021-11-23 PROCEDURE — 3060F PR POS MICROALBUMINURIA RESULT DOCUMENTED/REVIEW: ICD-10-PCS | Mod: CPTII,S$GLB,, | Performed by: ORTHOPAEDIC SURGERY

## 2021-11-23 PROCEDURE — 3060F POS MICROALBUMINURIA REV: CPT | Mod: CPTII,S$GLB,, | Performed by: ORTHOPAEDIC SURGERY

## 2021-11-23 PROCEDURE — 20550 TENDON SHEATH: ICD-10-PCS | Mod: F2,S$GLB,, | Performed by: ORTHOPAEDIC SURGERY

## 2021-11-23 PROCEDURE — 73130 X-RAY EXAM OF HAND: CPT | Mod: TC,50

## 2021-11-23 PROCEDURE — 4010F ACE/ARB THERAPY RXD/TAKEN: CPT | Mod: CPTII,S$GLB,, | Performed by: ORTHOPAEDIC SURGERY

## 2021-11-23 RX ORDER — TRIAMCINOLONE ACETONIDE 40 MG/ML
40 INJECTION, SUSPENSION INTRA-ARTICULAR; INTRAMUSCULAR
Status: DISCONTINUED | OUTPATIENT
Start: 2021-11-23 | End: 2021-11-23 | Stop reason: HOSPADM

## 2021-11-23 RX ADMIN — TRIAMCINOLONE ACETONIDE 40 MG: 40 INJECTION, SUSPENSION INTRA-ARTICULAR; INTRAMUSCULAR at 04:11

## 2021-11-30 DIAGNOSIS — E10.9 TYPE 1 DIABETES MELLITUS WITHOUT COMPLICATION: ICD-10-CM

## 2021-12-01 ENCOUNTER — PATIENT OUTREACH (OUTPATIENT)
Dept: ADMINISTRATIVE | Facility: HOSPITAL | Age: 51
End: 2021-12-01
Payer: COMMERCIAL

## 2021-12-02 RX ORDER — INSULIN DEGLUDEC 200 U/ML
INJECTION, SOLUTION SUBCUTANEOUS
Qty: 3 PEN | Refills: 0 | Status: SHIPPED | OUTPATIENT
Start: 2021-12-02 | End: 2022-03-14 | Stop reason: SDUPTHER

## 2021-12-17 DIAGNOSIS — K21.9 GASTROESOPHAGEAL REFLUX DISEASE WITHOUT ESOPHAGITIS: ICD-10-CM

## 2021-12-19 RX ORDER — ESOMEPRAZOLE MAGNESIUM 40 MG/1
CAPSULE, DELAYED RELEASE ORAL
Qty: 90 CAPSULE | Refills: 0 | Status: SHIPPED | OUTPATIENT
Start: 2021-12-19 | End: 2022-02-23

## 2022-01-06 DIAGNOSIS — E10.9 TYPE 1 DIABETES MELLITUS WITHOUT COMPLICATION: ICD-10-CM

## 2022-01-06 NOTE — TELEPHONE ENCOUNTER
No new care gaps identified.  Powered by [employer] by [employer]. Reference number: [id number].   1/06/2022 11:55:18 AM CST

## 2022-01-07 RX ORDER — INSULIN ASPART 100 [IU]/ML
INJECTION, SOLUTION INTRAVENOUS; SUBCUTANEOUS
Qty: 15 ML | Refills: 0 | Status: SHIPPED | OUTPATIENT
Start: 2022-01-07 | End: 2022-03-14 | Stop reason: SDUPTHER

## 2022-01-07 NOTE — TELEPHONE ENCOUNTER
Patient overdue for follow up.  Refill is given but the patient needs an appointment with Dr. Cochran or Stella Tovar for follow up.

## 2022-01-13 DIAGNOSIS — N52.9 ERECTILE DYSFUNCTION, UNSPECIFIED ERECTILE DYSFUNCTION TYPE: ICD-10-CM

## 2022-01-13 NOTE — TELEPHONE ENCOUNTER
No new care gaps identified.  Powered by [employer] by [employer]. Reference number: [id number].   1/13/2022 4:05:08 PM CST

## 2022-01-13 NOTE — TELEPHONE ENCOUNTER
----- Message from Jililan Higgins sent at 1/13/2022  3:16 PM CST -----  Contact: Rob  .Type:  Patient Returning Call    Who Called: Rob  Who Left Message for Patient: unknown  Does the patient know what this is regarding?: unknown  Would the patient rather a call back or a response via [facility]?  call  Best Call Back Number: [phone]  Additional Information: reports missing a call and is requesting another call back  ThanksYIN

## 2022-01-14 RX ORDER — SILDENAFIL CITRATE 20 MG/1
20 TABLET ORAL 3 TIMES DAILY
Qty: 30 TABLET | Refills: 11 | Status: SHIPPED | OUTPATIENT
Start: 2022-01-14

## 2022-02-22 DIAGNOSIS — K21.9 GASTROESOPHAGEAL REFLUX DISEASE WITHOUT ESOPHAGITIS: ICD-10-CM

## 2022-02-23 RX ORDER — BENAZEPRIL HYDROCHLORIDE 40 MG/1
TABLET ORAL
Qty: 90 TABLET | Refills: 0 | Status: SHIPPED | OUTPATIENT
Start: 2022-02-23 | End: 2022-03-14 | Stop reason: SDUPTHER

## 2022-02-23 RX ORDER — ESOMEPRAZOLE MAGNESIUM 40 MG/1
CAPSULE, DELAYED RELEASE ORAL
Qty: 90 CAPSULE | Refills: 0 | Status: SHIPPED | OUTPATIENT
Start: 2022-02-23 | End: 2022-05-09

## 2022-02-23 NOTE — TELEPHONE ENCOUNTER
Care Due:                  Date            Visit Type   Department     Provider  --------------------------------------------------------------------------------    Last Visit: None Found      None         None Found                              EP -                              PRIMARY      [facility] INTERNAL  Next Visit: 03-      CARE ([facility])   MEDICINE       Rudy Cochran                                                            Last  Test          Frequency    Reason                     Performed    Due Date  --------------------------------------------------------------------------------    HBA1C.......  6 months...  NOVOLOG, TRESIBA.........  07-   01-    Powered by [employer] by [employer]. Reference number: [id number].   2/22/2022 6:25:37 PM CST

## 2022-03-14 ENCOUNTER — OFFICE VISIT (OUTPATIENT)
Dept: INTERNAL MEDICINE | Facility: CLINIC | Age: 52
End: 2022-03-14
Payer: COMMERCIAL

## 2022-03-14 VITALS
OXYGEN SATURATION: 97 % | SYSTOLIC BLOOD PRESSURE: 130 MMHG | HEART RATE: 93 BPM | DIASTOLIC BLOOD PRESSURE: 78 MMHG | WEIGHT: 177 LBS | BODY MASS INDEX: 24.69 KG/M2

## 2022-03-14 DIAGNOSIS — E11.59 HYPERTENSION ASSOCIATED WITH DIABETES: ICD-10-CM

## 2022-03-14 DIAGNOSIS — E10.3592 TYPE 1 DIABETES MELLITUS WITH PROLIFERATIVE RETINOPATHY OF LEFT EYE WITHOUT MACULAR EDEMA: ICD-10-CM

## 2022-03-14 DIAGNOSIS — I15.2 HYPERTENSION ASSOCIATED WITH DIABETES: ICD-10-CM

## 2022-03-14 DIAGNOSIS — E10.65 UNCONTROLLED TYPE 1 DIABETES MELLITUS WITH HYPERGLYCEMIA: Primary | ICD-10-CM

## 2022-03-14 PROBLEM — R73.09 ELEVATED HEMOGLOBIN A1C: Status: RESOLVED | Noted: 2020-04-09 | Resolved: 2022-03-14

## 2022-03-14 PROCEDURE — 3075F SYST BP GE 130 - 139MM HG: CPT | Mod: CPTII,S$GLB,, | Performed by: INTERNAL MEDICINE

## 2022-03-14 PROCEDURE — 3075F PR MOST RECENT SYSTOLIC BLOOD PRESS GE 130-139MM HG: ICD-10-PCS | Mod: CPTII,S$GLB,, | Performed by: INTERNAL MEDICINE

## 2022-03-14 PROCEDURE — 99999 PR PBB SHADOW E&M-EST. PATIENT-LVL IV: ICD-10-PCS | Mod: PBBFAC,,, | Performed by: INTERNAL MEDICINE

## 2022-03-14 PROCEDURE — 1159F MED LIST DOCD IN RCRD: CPT | Mod: CPTII,S$GLB,, | Performed by: INTERNAL MEDICINE

## 2022-03-14 PROCEDURE — 3052F PR MOST RECENT HEMOGLOBIN A1C LEVEL 8.0 - < 9.0%: ICD-10-PCS | Mod: CPTII,S$GLB,, | Performed by: INTERNAL MEDICINE

## 2022-03-14 PROCEDURE — 1160F PR REVIEW ALL MEDS BY PRESCRIBER/CLIN PHARMACIST DOCUMENTED: ICD-10-PCS | Mod: CPTII,S$GLB,, | Performed by: INTERNAL MEDICINE

## 2022-03-14 PROCEDURE — 3052F HG A1C>EQUAL 8.0%<EQUAL 9.0%: CPT | Mod: CPTII,S$GLB,, | Performed by: INTERNAL MEDICINE

## 2022-03-14 PROCEDURE — 3078F DIAST BP <80 MM HG: CPT | Mod: CPTII,S$GLB,, | Performed by: INTERNAL MEDICINE

## 2022-03-14 PROCEDURE — 1159F PR MEDICATION LIST DOCUMENTED IN MEDICAL RECORD: ICD-10-PCS | Mod: CPTII,S$GLB,, | Performed by: INTERNAL MEDICINE

## 2022-03-14 PROCEDURE — 1160F RVW MEDS BY RX/DR IN RCRD: CPT | Mod: CPTII,S$GLB,, | Performed by: INTERNAL MEDICINE

## 2022-03-14 PROCEDURE — 3078F PR MOST RECENT DIASTOLIC BLOOD PRESSURE < 80 MM HG: ICD-10-PCS | Mod: CPTII,S$GLB,, | Performed by: INTERNAL MEDICINE

## 2022-03-14 PROCEDURE — 4010F PR ACE/ARB THEARPY RXD/TAKEN: ICD-10-PCS | Mod: CPTII,S$GLB,, | Performed by: INTERNAL MEDICINE

## 2022-03-14 PROCEDURE — 3008F BODY MASS INDEX DOCD: CPT | Mod: CPTII,S$GLB,, | Performed by: INTERNAL MEDICINE

## 2022-03-14 PROCEDURE — 99214 OFFICE O/P EST MOD 30 MIN: CPT | Mod: S$GLB,,, | Performed by: INTERNAL MEDICINE

## 2022-03-14 PROCEDURE — 99999 PR PBB SHADOW E&M-EST. PATIENT-LVL IV: CPT | Mod: PBBFAC,,, | Performed by: INTERNAL MEDICINE

## 2022-03-14 PROCEDURE — 99214 PR OFFICE/OUTPT VISIT, EST, LEVL IV, 30-39 MIN: ICD-10-PCS | Mod: S$GLB,,, | Performed by: INTERNAL MEDICINE

## 2022-03-14 PROCEDURE — 3008F PR BODY MASS INDEX (BMI) DOCUMENTED: ICD-10-PCS | Mod: CPTII,S$GLB,, | Performed by: INTERNAL MEDICINE

## 2022-03-14 PROCEDURE — 4010F ACE/ARB THERAPY RXD/TAKEN: CPT | Mod: CPTII,S$GLB,, | Performed by: INTERNAL MEDICINE

## 2022-03-14 RX ORDER — BENAZEPRIL HYDROCHLORIDE 40 MG/1
40 TABLET ORAL DAILY
Qty: 90 TABLET | Refills: 3 | Status: SHIPPED | OUTPATIENT
Start: 2022-03-14 | End: 2022-05-23

## 2022-03-14 RX ORDER — INSULIN ASPART 100 [IU]/ML
INJECTION, SOLUTION INTRAVENOUS; SUBCUTANEOUS
Qty: 15 ML | Refills: 11 | Status: SHIPPED | OUTPATIENT
Start: 2022-03-14 | End: 2022-08-31 | Stop reason: SDUPTHER

## 2022-03-14 RX ORDER — INSULIN DEGLUDEC 200 U/ML
54 INJECTION, SOLUTION SUBCUTANEOUS DAILY
Qty: 3 PEN | Refills: 11 | Status: SHIPPED | OUTPATIENT
Start: 2022-03-14 | End: 2022-04-22 | Stop reason: SDUPTHER

## 2022-03-14 RX ORDER — ATORVASTATIN CALCIUM 40 MG/1
40 TABLET, FILM COATED ORAL DAILY
Qty: 90 TABLET | Refills: 3 | Status: SHIPPED | OUTPATIENT
Start: 2022-03-14 | End: 2022-08-31 | Stop reason: SDUPTHER

## 2022-03-14 NOTE — PATIENT INSTRUCTIONS
Patient Education       Guide to Eating When You Have Diabetes   About this topic   This guide will help you control your blood sugar along with exercise and the drugs you are taking. You want to have a balanced amount of carbohydrates, fats, and protein in your meal. Following these diet guidelines may also help control your blood pressure and cholesterol.     What will the results be?   When you follow these diet guidelines, your blood sugar may be easier to keep within the goal blood sugar ranges. Ask your doctor for your goal blood sugar ranges.  What changes to diet are needed?   You need to balance how much carbohydrate, fat, and protein is in your meals. You also need to control the amount or portion size of the food you eat. Eat meals at about the same time every day. Do not skip a meal. Talk to your dietitian about making a personal meal plan for you.     Who should use this diet?   This diet is helpful to people with high blood sugar or diabetes.   What foods are good to eat?   Whole grains like:  1/3 cup (80 grams) brown rice  1/3 cup (80 grams) wild rice  1/3 cup (80 grams) whole wheat pasta  1 slice whole wheat or whole grain bread  3/4 cup (180 grams) high-fiber cereal  1/2 cup (120 grams) cooked oatmeal  1/2 (120 grams) English muffin  Fruits and vegetables like:  1/2 cup (120 grams) sweet potatoes  1/2 cup (120 grams) cooked vegetables, like squash, green beans, cauliflower, carrots, and cabbage  1 cup (240 grams) raw vegetables or salad greens  1 small apples or oranges  1/2 cup (120 grams) unsweetened fruit juice  Proteins like:  1 ounce (30 grams) lean beef or pork  1 ounce (30 grams) chicken, skin removed  1 ounce (30 grams) turkey, skin removed  1 ounce (30 grams) fish  1 ounce (30 grams) low-fat cheese or lunch meat  1/2 cup (120 grams) cooked beans ? black, kidney, chickpeas, or lentils  1 whole egg  What foods should be limited or avoided?   High fat or processed foods  like:  Lindsay  Sausage  Hot dogs  Processed snacks  Fats and oils like:  Margarine  Salad dressings  Foods that are high in salt like:  Table salt  Salted breads, rolls, crackers  Commercially-prepared potatoes and vegetable mixes  Canned vegetables and juices  Canned soups  Smoked, cured, or salted meats  Starches that are not whole grain like:  White rice  White potatoes  French fries  Pasta  White bread  Sugary cereals  Instant oatmeal  Baked goods, pastries  Croissants  What can be done to prevent this health problem?   Type 1 diabetes is a lifelong problem and you cannot prevent it. Type 2 diabetes can be prevented or delayed with lifestyle changes. You can still lead a normal life. Diabetes can be managed through diet, exercise, and drugs. Family members and friends can help you practice good health behaviors.  When do I need to call the doctor?   Blood sugar level is above 240 for more than a day  Blood sugar level drops to less than 40  Abnormal urine test results  Trouble breathing  Very sleepy  Throw up more than once  Many loose stools  Questions about your diet plan  Helpful tips   Try to eat smaller portions of a healthy balanced diet throughout the day. Take time to plan your meals and snacks.  Where can I learn more?   American Diabetes Association  https://www.cdc.gov/diabetes/managing/eat-well/meal-plan-method.html   HelpGuide.org  http://www.helpguide.org/home-pages/healthy-eating.htm   HelpGuide.org  http://www.helpguide.org/articles/diet-weight-loss/diabetes-diet-and-food-tips.htm   Last Reviewed Date   2021-07-21  Consumer Information Use and Disclaimer   This information is not specific medical advice and does not replace information you receive from your health care provider. This is only a brief summary of general information. It does NOT include all information about conditions, illnesses, injuries, tests, procedures, treatments, therapies, discharge instructions or life-style choices that  may apply to you. You must talk with your health care provider for complete information about your health and treatment options. This information should not be used to decide whether or not to accept your health care providers advice, instructions or recommendations. Only your health care provider has the knowledge and training to provide advice that is right for you.   Copyright   Copyright © 2021 [employer] and its affiliates and/or licensors. All rights reserved.

## 2022-03-14 NOTE — PROGRESS NOTES
Subjective:       Patient ID: Rob Marx is a 51 y.o. male.    Chief Complaint: Medication Refill and Sinus Problem (Pt c/o pressure in his head and eyes after having covid)    HPI Patient is a 51-year-old male presenting today following up on his diabetes, hypertension.  Patient has type 1 diabetes with hyperglycemia and retinopathy.  He has not been in for follow-up in some time.  He indicates he is taking his medications as prescribed.  He is checking his sugars.  His last A1c that we have was showing poor control he has not had 1 recently.    We discussed today the importance of regular follow-up to try to manage his sugars and make sure that he does not get out of control and he expresses understanding of that.  He indicates he will try to do better.    He saw Dr. Barnhart for his eye exam few months ago.  Dr. Barnhart recommended follow-up with Venkatesh Callaway MD due to the retinopathy findings on exam.  The patient never scheduled with Venkatesh Callaway MD.  We will put a referral in to try to get him in with Venkatesh Callaway MD as soon as we can.    Review of Systems   Constitutional: Negative for fever and unexpected weight change.   Respiratory: Negative for cough, shortness of breath and wheezing.    Cardiovascular: Negative for chest pain and palpitations.   Gastrointestinal: Negative for constipation, diarrhea, nausea and vomiting.   Genitourinary: Negative for dysuria and hematuria.       Objective:   /78   Pulse 93   Wt 80.3 kg (177 lb 0.5 oz)   SpO2 97%   BMI 24.69 kg/m²      Physical Exam  Vitals reviewed.   Constitutional:       Appearance: He is well-developed.   HENT:      Head: Normocephalic and atraumatic.      Right Ear: Tympanic membrane, ear canal and external ear normal.      Left Ear: Tympanic membrane, ear canal and external ear normal.   Eyes:      Pupils: Pupils are equal, round, and reactive to light.   Neck:      Thyroid: No thyromegaly.   Cardiovascular:      Rate and Rhythm:  Normal rate and regular rhythm.      Heart sounds: Normal heart sounds. No murmur heard.    No friction rub. No gallop.   Pulmonary:      Effort: Pulmonary effort is normal.      Breath sounds: Normal breath sounds. No wheezing or rales.   Abdominal:      General: Bowel sounds are normal. There is no distension.      Palpations: Abdomen is soft.      Tenderness: There is no abdominal tenderness.   Musculoskeletal:      Cervical back: Neck supple.   Psychiatric:         Mood and Affect: Mood normal.             Assessment:       1. Uncontrolled type 1 diabetes mellitus with hyperglycemia    2. Hypertension associated with diabetes    3. 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    Uncontrolled type 1 diabetes mellitus with hyperglycemia  -     CBC Auto Differential; Future; Expected date: 03/18/2022  -     Comprehensive Metabolic Panel; Future; Expected date: 03/18/2022  -     Lipid Panel; Future; Expected date: 03/18/2022  -     Microalbumin/Creatinine Ratio, Urine; Future; Expected date: 03/18/2022  -     PSA, Screening; Future; Expected date: 03/18/2022  -     Hemoglobin A1C; Future; Expected date: 03/18/2022  -     Hemoglobin A1C; Future; Expected date: 06/12/2022  -     NOVOLOG FLEXPEN U-100 INSULIN 100 unit/mL (3 mL) InPn pen; INJECT 10 TO 20 UNITS SUBCUTANEOUSLY THREE TIMES DAILY BEFORE MEALS  Dispense: 15 mL; Refill: 11  -     insulin degludec (TRESIBA FLEXTOUCH U-200) 200 unit/mL (3 mL) insulin pen; Inject 54 Units into the skin once daily.  Dispense: 3 pen; Refill: 11  -     atorvastatin (LIPITOR) 40 MG tablet; Take 1 tablet (40 mg total) by mouth once daily.  Dispense: 90 tablet; Refill: 3  -     benazepriL (LOTENSIN) 40 MG tablet; Take 1 tablet (40 mg total) by mouth once daily.  Dispense: 90 tablet; Refill: 3    Hypertension associated with diabetes  -     CBC Auto  Differential; Future; Expected date: 03/18/2022  -     Comprehensive Metabolic Panel; Future; Expected date: 03/18/2022  -     Lipid Panel; Future; Expected date: 03/18/2022  -     Microalbumin/Creatinine Ratio, Urine; Future; Expected date: 03/18/2022  -     PSA, Screening; Future; Expected date: 03/18/2022  -     Hemoglobin A1C; Future; Expected date: 03/18/2022  -     NOVOLOG FLEXPEN U-100 INSULIN 100 unit/mL (3 mL) InPn pen; INJECT 10 TO 20 UNITS SUBCUTANEOUSLY THREE TIMES DAILY BEFORE MEALS  Dispense: 15 mL; Refill: 11  -     insulin degludec (TRESIBA FLEXTOUCH U-200) 200 unit/mL (3 mL) insulin pen; Inject 54 Units into the skin once daily.  Dispense: 3 pen; Refill: 11  -     atorvastatin (LIPITOR) 40 MG tablet; Take 1 tablet (40 mg total) by mouth once daily.  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  Orders:  -     Ambulatory referral/consult to Ophthalmology; Future; Expected date: 03/21/2022  -     CBC Auto Differential; Future; Expected date: 03/18/2022  -     Comprehensive Metabolic Panel; Future; Expected date: 03/18/2022  -     Lipid Panel; Future; Expected date: 03/18/2022  -     Microalbumin/Creatinine Ratio, Urine; Future; Expected date: 03/18/2022  -     PSA, Screening; Future; Expected date: 03/18/2022  -     Hemoglobin A1C; Future; Expected date: 03/18/2022    Patient relates that since his COVID infection he has had issues with allergies.  We recommended over-the-counter antihistamines and Flonase as an initial treatment for this issue.  He does not see significant improvement with this regimen we will set him up to see Allergy or ENT for further evaluation.      Follow up in about 3 months (around 6/14/2022).

## 2022-03-18 ENCOUNTER — PATIENT MESSAGE (OUTPATIENT)
Dept: ADMINISTRATIVE | Facility: HOSPITAL | Age: 52
End: 2022-03-18
Payer: COMMERCIAL

## 2022-03-18 ENCOUNTER — LAB VISIT (OUTPATIENT)
Dept: LAB | Facility: HOSPITAL | Age: 52
End: 2022-03-18
Attending: INTERNAL MEDICINE
Payer: COMMERCIAL

## 2022-03-18 DIAGNOSIS — E11.59 HYPERTENSION ASSOCIATED WITH DIABETES: ICD-10-CM

## 2022-03-18 DIAGNOSIS — E10.65 UNCONTROLLED TYPE 1 DIABETES MELLITUS WITH HYPERGLYCEMIA: ICD-10-CM

## 2022-03-18 DIAGNOSIS — E10.3592 TYPE 1 DIABETES MELLITUS WITH PROLIFERATIVE RETINOPATHY OF LEFT EYE WITHOUT MACULAR EDEMA: ICD-10-CM

## 2022-03-18 DIAGNOSIS — I15.2 HYPERTENSION ASSOCIATED WITH DIABETES: ICD-10-CM

## 2022-03-18 PROCEDURE — 80061 LIPID PANEL: CPT | Performed by: INTERNAL MEDICINE

## 2022-03-18 PROCEDURE — 36415 COLL VENOUS BLD VENIPUNCTURE: CPT | Mod: PO | Performed by: INTERNAL MEDICINE

## 2022-03-18 PROCEDURE — 80053 COMPREHEN METABOLIC PANEL: CPT | Performed by: INTERNAL MEDICINE

## 2022-03-18 PROCEDURE — 84153 ASSAY OF PSA TOTAL: CPT | Performed by: INTERNAL MEDICINE

## 2022-03-18 PROCEDURE — 83036 HEMOGLOBIN GLYCOSYLATED A1C: CPT | Performed by: INTERNAL MEDICINE

## 2022-03-18 PROCEDURE — 85025 COMPLETE CBC W/AUTO DIFF WBC: CPT | Performed by: INTERNAL MEDICINE

## 2022-03-19 LAB
ALBUMIN SERPL BCP-MCNC: 3.8 G/DL (ref 3.5–5.2)
ALP SERPL-CCNC: 92 U/L (ref 55–135)
ALT SERPL W/O P-5'-P-CCNC: 24 U/L (ref 10–44)
ANION GAP SERPL CALC-SCNC: 15 MMOL/L (ref 8–16)
AST SERPL-CCNC: 28 U/L (ref 10–40)
BASOPHILS # BLD AUTO: 0.05 K/UL (ref 0–0.2)
BASOPHILS NFR BLD: 0.9 % (ref 0–1.9)
BILIRUB SERPL-MCNC: 0.5 MG/DL (ref 0.1–1)
BUN SERPL-MCNC: 13 MG/DL (ref 6–20)
CALCIUM SERPL-MCNC: 10.2 MG/DL (ref 8.7–10.5)
CHLORIDE SERPL-SCNC: 100 MMOL/L (ref 95–110)
CHOLEST SERPL-MCNC: 230 MG/DL (ref 120–199)
CHOLEST/HDLC SERPL: 4.6 {RATIO} (ref 2–5)
CO2 SERPL-SCNC: 24 MMOL/L (ref 23–29)
COMPLEXED PSA SERPL-MCNC: 0.53 NG/ML (ref 0–4)
CREAT SERPL-MCNC: 1.1 MG/DL (ref 0.5–1.4)
DIFFERENTIAL METHOD: ABNORMAL
EOSINOPHIL # BLD AUTO: 0.2 K/UL (ref 0–0.5)
EOSINOPHIL NFR BLD: 2.6 % (ref 0–8)
ERYTHROCYTE [DISTWIDTH] IN BLOOD BY AUTOMATED COUNT: 12.6 % (ref 11.5–14.5)
EST. GFR  (AFRICAN AMERICAN): >60 ML/MIN/1.73 M^2
EST. GFR  (NON AFRICAN AMERICAN): >60 ML/MIN/1.73 M^2
ESTIMATED AVG GLUCOSE: 194 MG/DL (ref 68–131)
GLUCOSE SERPL-MCNC: 236 MG/DL (ref 70–110)
HBA1C MFR BLD: 8.4 % (ref 4–5.6)
HCT VFR BLD AUTO: 52.5 % (ref 40–54)
HDLC SERPL-MCNC: 50 MG/DL (ref 40–75)
HDLC SERPL: 21.7 % (ref 20–50)
HGB BLD-MCNC: 17.4 G/DL (ref 14–18)
IMM GRANULOCYTES # BLD AUTO: 0.01 K/UL (ref 0–0.04)
IMM GRANULOCYTES NFR BLD AUTO: 0.2 % (ref 0–0.5)
LDLC SERPL CALC-MCNC: 144.4 MG/DL (ref 63–159)
LYMPHOCYTES # BLD AUTO: 1.7 K/UL (ref 1–4.8)
LYMPHOCYTES NFR BLD: 29.4 % (ref 18–48)
MCH RBC QN AUTO: 32 PG (ref 27–31)
MCHC RBC AUTO-ENTMCNC: 33.1 G/DL (ref 32–36)
MCV RBC AUTO: 97 FL (ref 82–98)
MONOCYTES # BLD AUTO: 0.4 K/UL (ref 0.3–1)
MONOCYTES NFR BLD: 7.6 % (ref 4–15)
NEUTROPHILS # BLD AUTO: 3.5 K/UL (ref 1.8–7.7)
NEUTROPHILS NFR BLD: 59.3 % (ref 38–73)
NONHDLC SERPL-MCNC: 180 MG/DL
NRBC BLD-RTO: 0 /100 WBC
PLATELET # BLD AUTO: 343 K/UL (ref 150–450)
PMV BLD AUTO: 10.4 FL (ref 9.2–12.9)
POTASSIUM SERPL-SCNC: 4.1 MMOL/L (ref 3.5–5.1)
PROT SERPL-MCNC: 8 G/DL (ref 6–8.4)
RBC # BLD AUTO: 5.44 M/UL (ref 4.6–6.2)
SODIUM SERPL-SCNC: 139 MMOL/L (ref 136–145)
TRIGL SERPL-MCNC: 178 MG/DL (ref 30–150)
WBC # BLD AUTO: 5.81 K/UL (ref 3.9–12.7)

## 2022-03-21 ENCOUNTER — PATIENT MESSAGE (OUTPATIENT)
Dept: INTERNAL MEDICINE | Facility: CLINIC | Age: 52
End: 2022-03-21
Payer: COMMERCIAL

## 2022-03-21 DIAGNOSIS — E10.65 UNCONTROLLED TYPE 1 DIABETES MELLITUS WITH HYPERGLYCEMIA: ICD-10-CM

## 2022-03-21 DIAGNOSIS — E10.3592 TYPE 1 DIABETES MELLITUS WITH PROLIFERATIVE RETINOPATHY OF LEFT EYE WITHOUT MACULAR EDEMA: Primary | ICD-10-CM

## 2022-03-21 NOTE — TELEPHONE ENCOUNTER
Notified pt of results and recommendations. I was not able to pull up the schedule for endocrinology. I told pt I was going to look into it and call him back. He requested that we call him tomorrow. He was at work today. We do not have endocrinology with [facility] in the [facility], only [place] and [facility]. Will call pt tomorrow to find out if he would like to go to either one of those areas or schedule a virtual visit.

## 2022-03-22 NOTE — TELEPHONE ENCOUNTER
Tried calling patient, and patient didn't answer. Patient doesn't have a voicemail set up. Called patient's wife, and she stated that she is out of town right now and to try calling him again.

## 2022-03-23 NOTE — TELEPHONE ENCOUNTER
Spoke with patient's wife, and informed her that we been trying to get in contact with her 's regarding his lab results. Wife stated that she will tell patient to get us a call back once he makes it home.

## 2022-03-25 ENCOUNTER — TELEPHONE (OUTPATIENT)
Dept: INTERNAL MEDICINE | Facility: CLINIC | Age: 52
End: 2022-03-25
Payer: COMMERCIAL

## 2022-03-25 NOTE — TELEPHONE ENCOUNTER
----- Message from Marguerite Montes sent at 3/25/2022  2:21 PM CDT -----  Pt is calling in regards to getting an apt scheduled with an endocrinologist. Please call [phone]

## 2022-04-22 ENCOUNTER — TELEPHONE (OUTPATIENT)
Dept: INTERNAL MEDICINE | Facility: CLINIC | Age: 52
End: 2022-04-22
Payer: COMMERCIAL

## 2022-04-22 DIAGNOSIS — E10.8 TYPE 1 DIABETES MELLITUS WITH COMPLICATION: ICD-10-CM

## 2022-04-22 DIAGNOSIS — I15.2 HYPERTENSION ASSOCIATED WITH DIABETES: ICD-10-CM

## 2022-04-22 DIAGNOSIS — E11.59 HYPERTENSION ASSOCIATED WITH DIABETES: ICD-10-CM

## 2022-04-22 DIAGNOSIS — E10.65 UNCONTROLLED TYPE 1 DIABETES MELLITUS WITH HYPERGLYCEMIA: ICD-10-CM

## 2022-04-22 RX ORDER — AMLODIPINE BESYLATE 5 MG/1
5 TABLET ORAL DAILY
Qty: 30 TABLET | Refills: 11 | Status: SHIPPED | OUTPATIENT
Start: 2022-04-22 | End: 2022-08-31 | Stop reason: SDUPTHER

## 2022-04-22 RX ORDER — PEN NEEDLE, DIABETIC 30 GX3/16"
1 NEEDLE, DISPOSABLE MISCELLANEOUS DAILY
Qty: 100 EACH | Refills: 0 | Status: SHIPPED | OUTPATIENT
Start: 2022-04-22 | End: 2023-05-10 | Stop reason: SDUPTHER

## 2022-04-22 RX ORDER — INSULIN DEGLUDEC 200 U/ML
54 INJECTION, SOLUTION SUBCUTANEOUS DAILY
Qty: 9 PEN | Refills: 0 | Status: SHIPPED | OUTPATIENT
Start: 2022-04-22 | End: 2022-08-16

## 2022-04-22 NOTE — TELEPHONE ENCOUNTER
The propranolol would help with the tremor but it also helps with bp.  Have him check his medications and send us an accurate list of what he is taking.    For the BP will add amlodipine 5 mg once daily.  Needs a visit for a bp check in 10-14 days
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
## 2022-04-22 NOTE — TELEPHONE ENCOUNTER
Spoke with patient, and informed him that Propranolol is for tremors ( Shaking ) also, and that Dr. Cochran sent in Amplodipine 5 MG. Told Saeed Rob to send us a accurate medication list. Made BP check nurse visit.

## 2022-04-22 NOTE — TELEPHONE ENCOUNTER
Refill Authorization Note   Rob Araseli  is requesting a refill authorization.  Brief Assessment and Rationale for Refill:  Approve     Medication Therapy Plan:       Medication Reconciliation Completed: No   Comments:     No Care Gaps recommended.     Note composed:12:40 PM 04/22/2022

## 2022-04-22 NOTE — TELEPHONE ENCOUNTER
----- Message from Marci Licona sent at 4/22/2022  9:16 AM CDT -----  Pt would like a call back in regards to getting a appointment to consult about a different blood pressure medication, pt stated it is not strong enough. Please call him at .[phone]
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
## 2022-04-22 NOTE — TELEPHONE ENCOUNTER
No new care gaps identified.  Powered by [employer] by [employer]. Reference number: [id number].   4/22/2022 9:22:00 AM CDT

## 2022-04-22 NOTE — TELEPHONE ENCOUNTER
Called Mr. Rivas back, and he states that he's only been taking Benazepril 40 MG. He states Mrs. Douglas May put him on it a while back, and he's been taking only that for BP. He then went on to say he think Mrs. Douglas put him on Propranolol 80 MG, and he didn't like the way it made him feel. He stated that it was throwing his equilibrium off so he stopped taking it after 6 days. Mr. Rivas also stated that he takes a medication for shaking, but he doesn't know the name of it.

## 2022-04-22 NOTE — TELEPHONE ENCOUNTER
"----- Message from Marci Licona sent at 4/22/2022  9:00 AM CDT -----  .Type:  RX Refill Request    Who Called: Rob  Refill or New Rx:Refill  RX Name and Strength:insulin degludec (TRESIBA FLEXTOUCH U-200) 200 unit/mL (3 mL) insulin pen  How is the patient currently taking it? (ex. 1XDay):1x a day  Is this a 30 day or 90 day RX:   Preferred Pharmacy with phone number:.  [facility]  [place], [place]  [address]  [address] [facility] [address]  Phone: [phone] Fax: [phone]  Local or Mail Order:Local  Ordering Provider:Dr. Cochran  Would the patient rather a call back or a response via [facility]? Call back  Best Call Back Number:.[phone]    Additional Information:         .Type:  RX Refill Request    Who Called: Rob  Refill or New Rx:Refill  RX Name and Strength:pen needle, diabetic 32 gauge x 5/32" Ndle  How is the patient currently taking it? (ex. 1XDay):1x a day  Is this a 30 day or 90 day RX:   Preferred Pharmacy with phone number:.  [facility]  [place], [place]  [address]  [address] [facility] [address]  Phone: [phone] Fax: [phone]  Local or Mail Order:Local  Ordering Provider:Dr. Cochran  Would the patient rather a call back or a response via [facility]? Call back  Best Call Back Number:.[phone]    Additional Information:         "

## 2022-05-06 DIAGNOSIS — K21.9 GASTROESOPHAGEAL REFLUX DISEASE WITHOUT ESOPHAGITIS: ICD-10-CM

## 2022-05-06 NOTE — TELEPHONE ENCOUNTER
No new care gaps identified.  [facility] Embedded Care Gaps. Reference number: [id number]. 5/06/2022   5:00:08 PM CDT

## 2022-05-07 NOTE — TELEPHONE ENCOUNTER
Refill Routing Note   Medication(s) are not appropriate for processing by [facility] for the following reason(s):      - Required indication for medication not on problem list (GI/GERD)    [facility] action(s):  Defer          Medication reconciliation completed: No     Appointments  past 12m or future 3m with PCP    Date Provider   Last Visit   3/14/2022 Rudy Cochran MD   Next Visit   6/15/2022 Rudy Cochran MD   ED visits in past 90 days: 0        Note composed:7:06 PM 05/06/2022

## 2022-05-09 RX ORDER — ESOMEPRAZOLE MAGNESIUM 40 MG/1
CAPSULE, DELAYED RELEASE ORAL
Qty: 90 CAPSULE | Refills: 0 | Status: SHIPPED | OUTPATIENT
Start: 2022-05-09 | End: 2022-08-23

## 2022-05-22 DIAGNOSIS — E10.65 UNCONTROLLED TYPE 1 DIABETES MELLITUS WITH HYPERGLYCEMIA: ICD-10-CM

## 2022-05-22 NOTE — TELEPHONE ENCOUNTER
No new care gaps identified.  [facility] Embedded Care Gaps. Reference number: [id number]. 5/22/2022   3:38:53 PM CDT

## 2022-05-23 RX ORDER — BENAZEPRIL HYDROCHLORIDE 40 MG/1
TABLET ORAL
Qty: 90 TABLET | Refills: 3 | Status: SHIPPED | OUTPATIENT
Start: 2022-05-23 | End: 2022-08-31 | Stop reason: SDUPTHER

## 2022-05-23 NOTE — TELEPHONE ENCOUNTER
Refill Authorization Note   Rob Araseli  is requesting a refill authorization.  Brief Assessment and Rationale for Refill:  Approve     Medication Therapy Plan:       Medication Reconciliation Completed: No   Comments:     No Care Gaps recommended.     Note composed:10:30 AM 05/23/2022

## 2022-06-14 ENCOUNTER — TELEPHONE (OUTPATIENT)
Dept: INTERNAL MEDICINE | Facility: CLINIC | Age: 52
End: 2022-06-14
Payer: COMMERCIAL

## 2022-06-14 NOTE — TELEPHONE ENCOUNTER
Called pt to remind him that his 06/15 3pm appt w/  will be at the [facility] location.    LVM.

## 2022-08-16 DIAGNOSIS — E11.59 HYPERTENSION ASSOCIATED WITH DIABETES: ICD-10-CM

## 2022-08-16 DIAGNOSIS — I15.2 HYPERTENSION ASSOCIATED WITH DIABETES: ICD-10-CM

## 2022-08-16 DIAGNOSIS — E10.65 UNCONTROLLED TYPE 1 DIABETES MELLITUS WITH HYPERGLYCEMIA: ICD-10-CM

## 2022-08-16 RX ORDER — INSULIN DEGLUDEC 200 U/ML
INJECTION, SOLUTION SUBCUTANEOUS
Qty: 45 PEN | Refills: 0 | Status: SHIPPED | OUTPATIENT
Start: 2022-08-16 | End: 2022-08-31 | Stop reason: SDUPTHER

## 2022-08-16 NOTE — TELEPHONE ENCOUNTER
No new care gaps identified.  [facility] Embedded Care Gaps. Reference number: [id number]. 8/16/2022   10:57:17 AM ZOHRAT

## 2022-08-17 NOTE — TELEPHONE ENCOUNTER
Refill Decision Note   Rob Araseli  is requesting a refill authorization.  Brief Assessment and Rationale for Refill:  Approve     Medication Therapy Plan:       Medication Reconciliation Completed: No   Comments:     No Care Gaps recommended.     Note composed:7:01 PM 08/16/2022

## 2022-08-23 DIAGNOSIS — K21.9 GASTROESOPHAGEAL REFLUX DISEASE WITHOUT ESOPHAGITIS: ICD-10-CM

## 2022-08-23 RX ORDER — ESOMEPRAZOLE MAGNESIUM 40 MG/1
CAPSULE, DELAYED RELEASE ORAL
Qty: 90 CAPSULE | Refills: 1 | Status: SHIPPED | OUTPATIENT
Start: 2022-08-23 | End: 2022-08-31 | Stop reason: SDUPTHER

## 2022-08-23 NOTE — TELEPHONE ENCOUNTER
Refill Decision Note   Rob Araseli  is requesting a refill authorization.  Brief Assessment and Rationale for Refill:  Approve     Medication Therapy Plan:       Medication Reconciliation Completed: No   Comments:     No Care Gaps recommended.     Note composed:5:29 PM 08/23/2022

## 2022-08-23 NOTE — TELEPHONE ENCOUNTER
No new care gaps identified.  [facility] Embedded Care Gaps. Reference number: [id number]. 8/23/2022   5:19:23 PM CDT

## 2022-08-24 ENCOUNTER — TELEPHONE (OUTPATIENT)
Dept: PRIMARY CARE CLINIC | Facility: CLINIC | Age: 52
End: 2022-08-24
Payer: COMMERCIAL

## 2022-08-24 DIAGNOSIS — E10.3592 TYPE 1 DIABETES MELLITUS WITH PROLIFERATIVE RETINOPATHY OF LEFT EYE WITHOUT MACULAR EDEMA: Primary | ICD-10-CM

## 2022-08-24 NOTE — TELEPHONE ENCOUNTER
----- Message from Bing Carrillo sent at 8/24/2022  3:44 PM CDT -----  type: Lab    Caller is requesting to schedule their Lab appointment prior to annual appointment.  Order is not listed in EPIC.  Please enter order and contact patient to schedule.    Name of Caller: Wife    Preferred Date and Time of Labs: Mornings of 26th or 27th of August     Date of Annual Physical Appointment: 8/31      Where would they like the lab performed? [facility]     Would the patient rather a call back or a response via [facility]? Callback     Best Call Back Number: [phone] or [employer] @[phone]    Additional Information:Please call back to advise on labs.

## 2022-08-26 ENCOUNTER — LAB VISIT (OUTPATIENT)
Dept: LAB | Facility: HOSPITAL | Age: 52
End: 2022-08-26
Attending: INTERNAL MEDICINE
Payer: COMMERCIAL

## 2022-08-26 DIAGNOSIS — E10.3592 TYPE 1 DIABETES MELLITUS WITH PROLIFERATIVE RETINOPATHY OF LEFT EYE WITHOUT MACULAR EDEMA: ICD-10-CM

## 2022-08-26 LAB
ESTIMATED AVG GLUCOSE: 186 MG/DL (ref 68–131)
HBA1C MFR BLD: 8.1 % (ref 4–5.6)

## 2022-08-26 PROCEDURE — 83036 HEMOGLOBIN GLYCOSYLATED A1C: CPT | Performed by: INTERNAL MEDICINE

## 2022-08-26 PROCEDURE — 36415 COLL VENOUS BLD VENIPUNCTURE: CPT | Mod: PO | Performed by: INTERNAL MEDICINE

## 2022-08-29 ENCOUNTER — TELEPHONE (OUTPATIENT)
Dept: INTERNAL MEDICINE | Facility: CLINIC | Age: 52
End: 2022-08-29
Payer: COMMERCIAL

## 2022-08-29 NOTE — TELEPHONE ENCOUNTER
----- Message from Lashell Herbert sent at 8/29/2022 10:27 AM CDT -----  Contact: Antonieta  The patient spouse is requesting an excuse for work in regards to the lab visit ordered by Dr. Cochran on 08/26/2022 please. She prefers to  or send via his work email address: [email or web address]       Please call Antonieta [phone]    Thanks

## 2022-08-31 ENCOUNTER — OFFICE VISIT (OUTPATIENT)
Dept: PRIMARY CARE CLINIC | Facility: CLINIC | Age: 52
End: 2022-08-31
Payer: COMMERCIAL

## 2022-08-31 VITALS
TEMPERATURE: 98 F | DIASTOLIC BLOOD PRESSURE: 86 MMHG | HEIGHT: 71 IN | WEIGHT: 174.19 LBS | BODY MASS INDEX: 24.39 KG/M2 | SYSTOLIC BLOOD PRESSURE: 128 MMHG | HEART RATE: 88 BPM

## 2022-08-31 DIAGNOSIS — K42.9 UMBILICAL HERNIA WITHOUT OBSTRUCTION AND WITHOUT GANGRENE: ICD-10-CM

## 2022-08-31 DIAGNOSIS — E10.3592 TYPE 1 DIABETES MELLITUS WITH PROLIFERATIVE RETINOPATHY OF LEFT EYE WITHOUT MACULAR EDEMA: Primary | ICD-10-CM

## 2022-08-31 DIAGNOSIS — I15.2 HYPERTENSION ASSOCIATED WITH DIABETES: ICD-10-CM

## 2022-08-31 DIAGNOSIS — E11.59 HYPERTENSION ASSOCIATED WITH DIABETES: ICD-10-CM

## 2022-08-31 DIAGNOSIS — K21.9 GASTROESOPHAGEAL REFLUX DISEASE WITHOUT ESOPHAGITIS: ICD-10-CM

## 2022-08-31 DIAGNOSIS — R25.1 TREMOR: ICD-10-CM

## 2022-08-31 PROCEDURE — 3008F PR BODY MASS INDEX (BMI) DOCUMENTED: ICD-10-PCS | Mod: CPTII,S$GLB,, | Performed by: INTERNAL MEDICINE

## 2022-08-31 PROCEDURE — 4010F ACE/ARB THERAPY RXD/TAKEN: CPT | Mod: CPTII,S$GLB,, | Performed by: INTERNAL MEDICINE

## 2022-08-31 PROCEDURE — 4010F PR ACE/ARB THEARPY RXD/TAKEN: ICD-10-PCS | Mod: CPTII,S$GLB,, | Performed by: INTERNAL MEDICINE

## 2022-08-31 PROCEDURE — 1159F PR MEDICATION LIST DOCUMENTED IN MEDICAL RECORD: ICD-10-PCS | Mod: CPTII,S$GLB,, | Performed by: INTERNAL MEDICINE

## 2022-08-31 PROCEDURE — 3052F HG A1C>EQUAL 8.0%<EQUAL 9.0%: CPT | Mod: CPTII,S$GLB,, | Performed by: INTERNAL MEDICINE

## 2022-08-31 PROCEDURE — 3060F POS MICROALBUMINURIA REV: CPT | Mod: CPTII,S$GLB,, | Performed by: INTERNAL MEDICINE

## 2022-08-31 PROCEDURE — 3074F SYST BP LT 130 MM HG: CPT | Mod: CPTII,S$GLB,, | Performed by: INTERNAL MEDICINE

## 2022-08-31 PROCEDURE — 99999 PR PBB SHADOW E&M-EST. PATIENT-LVL V: CPT | Mod: PBBFAC,,, | Performed by: INTERNAL MEDICINE

## 2022-08-31 PROCEDURE — 3008F BODY MASS INDEX DOCD: CPT | Mod: CPTII,S$GLB,, | Performed by: INTERNAL MEDICINE

## 2022-08-31 PROCEDURE — 99214 PR OFFICE/OUTPT VISIT, EST, LEVL IV, 30-39 MIN: ICD-10-PCS | Mod: S$GLB,,, | Performed by: INTERNAL MEDICINE

## 2022-08-31 PROCEDURE — 3066F NEPHROPATHY DOC TX: CPT | Mod: CPTII,S$GLB,, | Performed by: INTERNAL MEDICINE

## 2022-08-31 PROCEDURE — 3052F PR MOST RECENT HEMOGLOBIN A1C LEVEL 8.0 - < 9.0%: ICD-10-PCS | Mod: CPTII,S$GLB,, | Performed by: INTERNAL MEDICINE

## 2022-08-31 PROCEDURE — 3066F PR DOCUMENTATION OF TREATMENT FOR NEPHROPATHY: ICD-10-PCS | Mod: CPTII,S$GLB,, | Performed by: INTERNAL MEDICINE

## 2022-08-31 PROCEDURE — 1160F PR REVIEW ALL MEDS BY PRESCRIBER/CLIN PHARMACIST DOCUMENTED: ICD-10-PCS | Mod: CPTII,S$GLB,, | Performed by: INTERNAL MEDICINE

## 2022-08-31 PROCEDURE — 3079F DIAST BP 80-89 MM HG: CPT | Mod: CPTII,S$GLB,, | Performed by: INTERNAL MEDICINE

## 2022-08-31 PROCEDURE — 99214 OFFICE O/P EST MOD 30 MIN: CPT | Mod: S$GLB,,, | Performed by: INTERNAL MEDICINE

## 2022-08-31 PROCEDURE — 1160F RVW MEDS BY RX/DR IN RCRD: CPT | Mod: CPTII,S$GLB,, | Performed by: INTERNAL MEDICINE

## 2022-08-31 PROCEDURE — 3060F PR POS MICROALBUMINURIA RESULT DOCUMENTED/REVIEW: ICD-10-PCS | Mod: CPTII,S$GLB,, | Performed by: INTERNAL MEDICINE

## 2022-08-31 PROCEDURE — 3079F PR MOST RECENT DIASTOLIC BLOOD PRESSURE 80-89 MM HG: ICD-10-PCS | Mod: CPTII,S$GLB,, | Performed by: INTERNAL MEDICINE

## 2022-08-31 PROCEDURE — 99999 PR PBB SHADOW E&M-EST. PATIENT-LVL V: ICD-10-PCS | Mod: PBBFAC,,, | Performed by: INTERNAL MEDICINE

## 2022-08-31 PROCEDURE — 3074F PR MOST RECENT SYSTOLIC BLOOD PRESSURE < 130 MM HG: ICD-10-PCS | Mod: CPTII,S$GLB,, | Performed by: INTERNAL MEDICINE

## 2022-08-31 PROCEDURE — 1159F MED LIST DOCD IN RCRD: CPT | Mod: CPTII,S$GLB,, | Performed by: INTERNAL MEDICINE

## 2022-08-31 RX ORDER — AMLODIPINE BESYLATE 5 MG/1
5 TABLET ORAL DAILY
Qty: 90 TABLET | Refills: 3 | Status: SHIPPED | OUTPATIENT
Start: 2022-08-31 | End: 2023-06-23

## 2022-08-31 RX ORDER — INSULIN ASPART 100 [IU]/ML
INJECTION, SOLUTION INTRAVENOUS; SUBCUTANEOUS
Qty: 15 ML | Refills: 11 | Status: SHIPPED | OUTPATIENT
Start: 2022-08-31 | End: 2022-12-07 | Stop reason: ALTCHOICE

## 2022-08-31 RX ORDER — ESOMEPRAZOLE MAGNESIUM 40 MG/1
40 CAPSULE, DELAYED RELEASE ORAL DAILY
Qty: 90 CAPSULE | Refills: 3 | Status: SHIPPED | OUTPATIENT
Start: 2022-08-31

## 2022-08-31 RX ORDER — ATORVASTATIN CALCIUM 40 MG/1
40 TABLET, FILM COATED ORAL DAILY
Qty: 90 TABLET | Refills: 3 | Status: SHIPPED | OUTPATIENT
Start: 2022-08-31

## 2022-08-31 RX ORDER — PROPRANOLOL HYDROCHLORIDE 80 MG/1
80 CAPSULE, EXTENDED RELEASE ORAL DAILY
Qty: 90 CAPSULE | Refills: 3 | Status: SHIPPED | OUTPATIENT
Start: 2022-08-31 | End: 2023-06-23

## 2022-08-31 RX ORDER — BENAZEPRIL HYDROCHLORIDE 40 MG/1
40 TABLET ORAL DAILY
Qty: 90 TABLET | Refills: 3 | Status: SHIPPED | OUTPATIENT
Start: 2022-08-31

## 2022-08-31 RX ORDER — INSULIN DEGLUDEC 200 U/ML
60 INJECTION, SOLUTION SUBCUTANEOUS DAILY
Qty: 15 PEN | Refills: 3 | Status: SHIPPED | OUTPATIENT
Start: 2022-08-31 | End: 2022-10-17

## 2022-08-31 NOTE — LETTER
August 31, 2022    Rob Marx  [id number]  [place] [id number]             [facility]  Primary Care  [address]  [place] [address]  Phone: [phone]  Fax: [phone]   August 31, 2022     Patient: Rob Marx   YOB: 1970   Date of Visit: 8/31/2022       To Whom it May Concern:    Rob Marx was seen in my clinic on 8/31/2022. He may return to work on 08/31/2022.    Please excuse him from any  work missed.    If you have any questions or concerns, please don't hesitate to call.    Sincerely,         Rudy Cochran MD

## 2022-08-31 NOTE — LETTER
August 31, 2022    Rob Marx  [id number]  [place] [address]             [facility]  Primary Care  [address]  [place] [address]  Phone: [phone]  Fax: [phone]   August 31, 2022     Patient: Rob Marx   YOB: 1970   Date of Visit: 08/26/2022       To Whom it May Concern:    Rob Marx was seen in my clinic on 08/26/2022.     Please excuse him from any work missed.    If you have any questions or concerns, please don't hesitate to call.    Sincerely,         Rudy Cochran MD

## 2022-08-31 NOTE — PROGRESS NOTES
"Subjective:       Patient ID: Rob Marx is a 52 y.o. male.    Chief Complaint: Follow up    HPI 1.  DM is improved but still needs improvement.  Checking sugars once daily.  Using with meals insulin without checks.  Struggles to check sugars throughout the day.      2. Hyperension is controlled.  no issues with meds.  tolerating everything well.    3. notes a change around his belly button.  some fullness. no pain.      4. GERD is stable.  Continues on meds. and is stable.    Review of Systems   Constitutional:  Negative for fever and unexpected weight change.   Respiratory:  Negative for cough, shortness of breath and wheezing.    Cardiovascular:  Negative for chest pain and palpitations.   Gastrointestinal:  Negative for constipation, diarrhea, nausea and vomiting.        Small bulge at umbilicus   Genitourinary:  Negative for dysuria and hematuria.     Objective:   /86   Pulse 88   Temp 97.7 °F (36.5 °C)   Ht 5' 11" (1.803 m)   Wt 79 kg (174 lb 2.6 oz)   BMI 24.29 kg/m²      Physical Exam  Vitals reviewed.   Constitutional:       Appearance: He is well-developed.   HENT:      Head: Normocephalic and atraumatic.      Right Ear: Tympanic membrane, ear canal and external ear normal.      Left Ear: Tympanic membrane, ear canal and external ear normal.   Eyes:      Pupils: Pupils are equal, round, and reactive to light.   Neck:      Thyroid: No thyromegaly.   Cardiovascular:      Rate and Rhythm: Normal rate and regular rhythm.      Heart sounds: Normal heart sounds. No murmur heard.    No friction rub. No gallop.   Pulmonary:      Effort: Pulmonary effort is normal.      Breath sounds: Normal breath sounds. No wheezing or rales.   Abdominal:      General: Bowel sounds are normal. There is no distension.      Palpations: Abdomen is soft.      Tenderness: There is no abdominal tenderness.      Comments: Easily reducible umbilical hernia.  Small.   Musculoskeletal:      Cervical back: Neck supple. "   Psychiatric:         Mood and Affect: Mood normal.       Lab Visit on 08/26/2022   Component Date Value    Hemoglobin A1C 08/26/2022 8.1 (H)     Estimated Avg Glucose 08/26/2022 186 (H)    Lab Visit on 08/26/2022   Component Date Value    Microalbumin, Urine 08/26/2022 67.0     Creatinine, Urine 08/26/2022 67.0     Microalb/Creat Ratio 08/26/2022 100.0 (H)        Assessment:       1. 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 Hypertension associated with diabetes    3. Umbilical hernia without obstruction and without gangrene    4. Tremor    5. Gastroesophageal reflux disease without esophagitis          Plan:   No problem-specific Assessment & Plan notes found for this encounter.    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  monitor blood sugar in aml.  Goal of less than 120.   Orders:  -     Ambulatory referral/consult to Diabetes Education; Future; Expected date: 09/07/2022  -     Hemoglobin A1C; Future; Expected date: 12/29/2022    Hypertension associated with diabetes  Comments:  Continue current meds.    Umbilical hernia without obstruction and without gangrene  Comments:  easily reducible. If develop pain or inability to reduce to ER    Tremor  Comments:  continue propranolol.   Orders:  -     propranoloL (INDERAL LA) 80 MG 24 hr capsule; Take 1 capsule (80 mg total) by mouth once daily.  Dispense: 90 capsule; Refill: 3    Gastroesophageal reflux disease without esophagitis  -     esomeprazole (NEXIUM) 40 MG capsule; Take 1 capsule (40 mg total) by mouth once daily.  Dispense: 90 capsule; Refill: 3    Other orders  -     benazepriL (LOTENSIN) 40 MG tablet; Take 1 tablet (40 mg total) by mouth once daily.  Dispense: 90 tablet; Refill: 3  -     atorvastatin (LIPITOR) 40 MG tablet; Take 1 tablet (40 mg total) by mouth once daily.  Dispense: 90 tablet; Refill: 3  -      amLODIPine (NORVASC) 5 MG tablet; Take 1 tablet (5 mg total) by mouth once daily.  Dispense: 90 tablet; Refill: 3  -     insulin degludec (TRESIBA FLEXTOUCH U-200) 200 unit/mL (3 mL) insulin pen; Inject 60 Units into the skin once daily.  Dispense: 15 pen; Refill: 3  -     NOVOLOG FLEXPEN U-100 INSULIN 100 unit/mL (3 mL) InPn pen; INJECT 10 TO 20 UNITS SUBCUTANEOUSLY THREE TIMES DAILY BEFORE MEALS  Dispense: 15 mL; Refill: 11    Referral to Diabetes for establishment of CGM and Pump      Follow up in about 4 months (around 12/31/2022).

## 2022-08-31 NOTE — PATIENT INSTRUCTIONS
[facility]       Guide to Eating When You Have Diabetes   About this topic   This guide will help you control your blood sugar along with exercise and the drugs you are taking. You want to have a balanced amount of carbohydrates, fats, and protein in your meal. Following these diet guidelines may also help control your blood pressure and cholesterol.     What will the results be?   When you follow these diet guidelines, your blood sugar may be easier to keep within the goal blood sugar ranges. Ask your doctor for your goal blood sugar ranges.  What changes to diet are needed?   You need to balance how much carbohydrate, fat, and protein is in your meals. You also need to control the amount or portion size of the food you eat. Eat meals at about the same time every day. Do not skip a meal. Talk to your dietitian about making a personal meal plan for you.     Who should use this diet?   This diet is helpful to people with high blood sugar or diabetes.   What foods are good to eat?   Whole grains like:  1/3 cup (80 grams) brown rice  1/3 cup (80 grams) wild rice  1/3 cup (80 grams) whole wheat pasta  1 slice whole wheat or whole grain bread  3/4 cup (180 grams) high-fiber cereal  1/2 cup (120 grams) cooked oatmeal  1/2 (120 grams) English muffin  Fruits and vegetables like:  1/2 cup (120 grams) sweet potatoes  1/2 cup (120 grams) cooked vegetables, like squash, green beans, cauliflower, carrots, and cabbage  1 cup (240 grams) raw vegetables or salad greens  1 small apples or oranges  1/2 cup (120 grams) unsweetened fruit juice  Proteins like:  1 ounce (30 grams) lean beef or pork  1 ounce (30 grams) chicken, skin removed  1 ounce (30 grams) turkey, skin removed  1 ounce (30 grams) fish  1 ounce (30 grams) low-fat cheese or lunch meat  1/2 cup (120 grams) cooked beans ? black, kidney, chickpeas, or lentils  1 whole egg  What foods should be limited or avoided?   High fat or processed foods  like:  Lindsay  Sausage  Hot dogs  Processed snacks  Fats and oils like:  Margarine  Salad dressings  Foods that are high in salt like:  Table salt  Salted breads, rolls, crackers  Commercially-prepared potatoes and vegetable mixes  Canned vegetables and juices  Canned soups  Smoked, cured, or salted meats  Starches that are not whole grain like:  White rice  White potatoes  French fries  Pasta  White bread  Sugary cereals  Instant oatmeal  Baked goods, pastries  Croissants  What can be done to prevent this health problem?   Type 1 diabetes is a lifelong problem and you cannot prevent it. Type 2 diabetes can be prevented or delayed with lifestyle changes. You can still lead a normal life. Diabetes can be managed through diet, exercise, and drugs. Family members and friends can help you practice good health behaviors.  When do I need to call the doctor?   Blood sugar level is above 240 for more than a day  Blood sugar level drops to less than 40  Abnormal urine test results  Trouble breathing  Very sleepy  Throw up more than once  Many loose stools  Questions about your diet plan  Helpful tips   Try to eat smaller portions of a healthy balanced diet throughout the day. Take time to plan your meals and snacks.  Where can I learn more?   American Diabetes Association  https://www.cdc.gov/diabetes/managing/eat-well/meal-plan-method.html   HelpGuide.org  http://www.helpguide.org/home-pages/healthy-eating.htm   HelpGuide.org  http://www.helpguide.org/articles/diet-weight-loss/diabetes-diet-and-food-tips.htm   Last Reviewed Date   2021-07-21  Consumer Information Use and Disclaimer   This information is not specific medical advice and does not replace information you receive from your health care provider. This is only a brief summary of general information. It does NOT include all information about conditions, illnesses, injuries, tests, procedures, treatments, therapies, discharge instructions or life-style choices that  may apply to you. You must talk with your health care provider for complete information about your health and treatment options. This information should not be used to decide whether or not to accept your health care providers advice, instructions or recommendations. Only your health care provider has the knowledge and training to provide advice that is right for you.   Copyright   Copyright © 2021 [employer] and its affiliates and/or licensors. All rights reserved.

## 2022-09-12 ENCOUNTER — CLINICAL SUPPORT (OUTPATIENT)
Dept: DIABETES | Facility: CLINIC | Age: 52
End: 2022-09-12
Payer: COMMERCIAL

## 2022-09-12 VITALS — WEIGHT: 177.94 LBS | BODY MASS INDEX: 24.91 KG/M2 | HEIGHT: 71 IN

## 2022-09-12 DIAGNOSIS — E10.3592 TYPE 1 DIABETES MELLITUS WITH PROLIFERATIVE RETINOPATHY OF LEFT EYE WITHOUT MACULAR EDEMA: ICD-10-CM

## 2022-09-12 PROCEDURE — 99999 PR PBB SHADOW E&M-EST. PATIENT-LVL II: CPT | Mod: PBBFAC,,, | Performed by: DIETITIAN, REGISTERED

## 2022-09-12 PROCEDURE — G0108 DIAB MANAGE TRN  PER INDIV: HCPCS | Mod: S$GLB,,, | Performed by: DIETITIAN, REGISTERED

## 2022-09-12 PROCEDURE — 99999 PR PBB SHADOW E&M-EST. PATIENT-LVL II: ICD-10-PCS | Mod: PBBFAC,,, | Performed by: DIETITIAN, REGISTERED

## 2022-09-12 PROCEDURE — G0108 PR DIAB MANAGE TRN  PER INDIV: ICD-10-PCS | Mod: S$GLB,,, | Performed by: DIETITIAN, REGISTERED

## 2022-09-12 NOTE — PROGRESS NOTES
"Diabetes Care Specialist Progress Note  Author: Tom Santos RD  Date: 9/15/2022    Program Intake  Reason for Diabetes Program Visit:: Initial Diabetes Assessment  Current diabetes risk level:: moderate  In the last 12 months, have you:: none    Lab Results   Component Value Date    HGBA1C 8.1 (H) 08/26/2022       CURRENT DM MEDICATIONS:   Tresiba, 60 units in am   Novolog, 10 units am, 10-20 units before dinner depending on carb intake     Clinical    Weight: 80.7 kg (177 lb 14.6 oz)   Height: 5' 11" (180.3 cm)   Body mass index is 24.81 kg/m².    Patient Health Rating  Compared to other people your age, how would you rate your health?: Good    Problem Review  Reviewed Problem List with Patient: yes  Active comorbidities affecting diabetes self-care.: yes  Comorbidities: Hypertension  Reviewed health maintenance: yes    Clinical Assessment  Current Diabetes Treatment: Diet, Insulin  Have you ever experienced hypoglycemia (low blood sugar)?: yes  In the last month, how often have you experienced low blood sugar?: once a week (usually between 10p-12a)  Are you able to tell when your blood sugar is low?: Yes (no symptoms until BG is 50)  What symptoms do you experience?: Other, Sweaty, Shaky (1st sign is tingling in lips)  Have you ever been hospitalized because your blood sugar was too low?: yes (see comments) (4 months ago, BG down to 30s and he passed out)  How do you treat hypoglycemia (low blood sugar)?: other (whole Coke or candy bar)  Have you ever experienced hyperglycemia (high blood sugar)?: yes  In the last month, how often have you experienced high blood sugar?: once a day  Are you able to tell when your blood sugar is high?: No (comment) (only when BG reaches 500s)  Have you ever been hospitalized because your blood sugar was high?: yes (see comments)    Medication Information  How do you obtain your medications?: Patient drives  How many days a week do you miss your medications?: Never  Do you " sometimes have difficulty refilling your medications?: No  Medication adherence impacting ability to self-manage diabetes?: No    Labs  Do you have regular lab work to monitor your medications?: Yes  Type of Regular Lab Work: A1c, Cholesterol, Microalbumin, CBC, BMP  Where do you get your labs drawn?: [facility]  Lab Compliance Barriers: No    Nutritional Status  Diet: Other diet (eats very little bread, rarely pizza, very rarely eats rice, no fruit juice, rarely eats fruit)  Meal Plan 24 Hour Recall: Breakfast, Lunch, Dinner, Snack  Meal Plan 24 Hour Recall - Breakfast: none  Meal Plan 24 Hour Recall - Lunch: snacked on wings over a few hours  Meal Plan 24 Hour Recall - Dinner: smoked sausage poboy; Diet Coke  Meal Plan 24 Hour Recall - Snack: may snack on chips during the day  Change in appetite?: No  Dentation:: Wears Dentures (wears a partial - cannot bite apples)  Recent Changes in Weight: No Recent Weight Change  Current nutritional status an area of need that is impacting patient's ability to self-manage diabetes?: No    Additional Social History    Support  Does anyone support you with your diabetes care?: yes  Who supports you?: spouse  Who takes you to your medical appointments?: self  Does the current support meet the patient's needs?: Yes  Is Support an area impacting ability to self-manage diabetes?: No    Access to Mass Media & Technology  Does the patient have access to any of the following devices or technologies?: Smart phone  Media or technology needs impacting ability to self-manage diabetes?: No    Cognitive/Behavioral Health  Alert and Oriented: Yes  Difficulty Thinking: No  Requires Prompting: No  Requires assistance for routine expression?: No  Cognitive or behavioral barriers impacting ability to self-manage diabetes?: No    Culture/Latter day  Culture or Baptism beliefs that may impact ability to access healthcare: No    Communication  Language preference: English  Hearing Problems: No  Vision  Problems: Yes (retinopathy)  Vision problem type:: Decreased Vision  Vision Assistance: Glasses  Communication needs impacting ability to self-manage diabetes?: No    Health Literacy  Preferred Learning Method: Face to Face, Reading Materials  How often do you need to have someone help you read instructions, pamphlets, or written material from your doctor or pharmacy?: Never  Health literacy needs impacting ability to self-manage diabetes?: No      Diabetes Self-Management Skills Assessment    Diabetes Disease Process/Treatment Options  Patient/caregiver able to state what happens when someone has diabetes.: yes  Patient/caregiver knows what type of diabetes they have.: yes  Diabetes Type : Type I  Patient/caregiver able to identify at least three signs and symptoms of diabetes.: yes  Diabetes Disease Process/Treatment Options: Skills Assessment Completed: Yes  Assessment indicates:: Adequate understanding  Area of need?: No    Nutrition/Healthy Eating  Challenges to healthy eating:: other (see comments) (irregular meals)  Method of carbohydrate measurement:: eyeballing/guessing, other (see comments) (mostly just not eating starches as often as other foods)  Patient can identify foods that impact blood sugar.: yes  Patient-identified foods:: starches (bread, pasta, rice, cereal), starchy vegetables (corn, peas, beans), soda  Nutrition/Healthy Eating Skills Assessment Completed:: Yes  Assessment indicates:: Knowledge deficit, Instruction Needed  Area of need?: Yes    Physical Activity/Exercise  Patient's daily activity level:: constantly moving  Patient formally exercises outside of work.: no  Reasons for not exercising:: work schedule  Patient can identify reasons why exercise/physical activity is important in diabetes management.: no  Physical Activity/Exercise Skills Assessment Completed: : Yes  Assessment indicates:: Knowledge deficit  Area of need?: Yes    Medications  Patient is able to describe current  diabetes management routine.: yes  Diabetes management routine:: insulin  Patient is able to identify current diabetes medications, dosages, and appropriate timing of medications.: no  Patient understands the purpose of the medications taken for diabetes.: yes  Medication Skills Assessment Completed:: Yes  Assessment indicates:: Instruction Needed  Area of need?: Yes    Home Blood Glucose Monitoring  Patient states that blood sugar is checked at home daily.: no  Reasons for not monitoring:: other (see comments) (work environment is dirty so he doesn't check at work)  Home Blood Glucose Monitoring Skills Assessment Completed: : Yes  Assessment indicates:: Instruction Needed (occasionally checks in the evening and BG has been )  Area of need?: Yes    Acute Complications  Patient is able to identify types of acute complications: Yes  Patient Identified:: Hypoglycemia, Hyperglycemia  Patient is able to state the basic meaning of hypoglycemia?: Yes  Able to state the blood sugar range for hypoglycemia?: no (see comments)  Patient can identify general symptoms of hypoglycemia: yes  Patient identified:: sweating, shakiness  Able to state proper treatment of hypoglycemia?: no (see comments) (pt has been overtreating)  Patient is able to state the basic meaning of hyperglycemia?: Yes  Able to state the blood sugar range for hyperglycemia?: no (see comments)  Patient able to state proper treatment of hyperglycemia?: no (see comments)  Acute Complications Skills Assessment Completed: : Yes  Assessment indicates:: Knowledge deficit, Instruction Needed  Area of need?: Yes    Chronic Complications  Chronic Complications Skills Assessment Completed: : No  Deferred due to:: Time  Area of need?:  (deferred)    Psychosocial/Coping  Psychosocial/Coping Skills Assessment Completed: : No  Deffered due to:: Time  Area of need?:  (deferred)      Assessment Summary and Plan    Based on today's diabetes care assessment, the following  areas of need were identified:      Social 9/12/2022   Support No   Access to Mass Media/Tech No   Cognitive/Behavioral Health No   Culture/Denominational No   Communication No   Health Literacy No        Clinical 9/12/2022   Medication Adherence No   Lab Compliance No   Nutritional Status No        Diabetes Self-Management Skills 9/12/2022   Diabetes Disease Process/Treatment Options No   Nutrition/Healthy Eating Yes - care plan added     Physical Activity/Exercise Yes - Discussed physical activity as it relates to insulin sensitivity and weight loss.      Medication Yes - care plan added     Home Blood Glucose Monitoring Yes - care plan added     Acute Complications Yes - Reviewed blood glucose goals, prevention, detection, signs and symptoms, and treatment of hypoglycemia and hyperglycemia, and when to contact the clinic.  Pt to avoid over treating hypoglycemia     Chronic Complications Deferred    Psychosocial/Coping Deferred           Today's interventions were provided through individual discussion, instruction, and written materials were provided.      Patient verbalized understanding of instruction and written materials.  Pt was able to return back demonstration of instructions today. Patient understood key points, needs reinforcement and further instruction.     Diabetes Self-Management Care Plan:    Today's Diabetes Self-Management Care Plan was developed with Rob's input. Rob has agreed to work toward the following goal(s) to improve his/her overall diabetes control.      Care Plan: Diabetes Management   Updates made since 8/16/2022 12:00 AM        Problem: Medications         Goal: Patient Agrees to take Diabetes Medication(s) as prescribed.    Start Date: 9/12/2022   Expected End Date: 3/15/2023   Priority: Medium   Barriers: Other (comments)   Note:    Pt to always take Novolog before meals  Currently Novolog is prescribed 10-20 units before meals, depending on carbs in meal.   Reviewed carb counting  so pt can make better decisions on how much insulin to take.        Task: Reviewed with patient all current diabetes medications and provided basic review of the purpose, dosage, frequency, side effects, and storage of both oral and injectable diabetes medications. Completed 9/15/2022        Task: Discussed guidelines for preventing, detecting and treating hypoglycemia and hyperglycemia and reviewed the importance of meal and medication timing with diabetes mediations for prevention of hypoglycemia and maximum drug benefit. Completed 9/15/2022        Problem: Healthy Eating         Goal: Eat 3 meals daily with 45-60g/3-4 servings of Carbohydrate per meal and low carb snacks.    Start Date: 9/12/2022   Expected End Date: 3/15/2023   Priority: Low   Barriers: Other (comments)   Note:    Pt is often eating junk foods or fast foods because of his work. He works outside in the heat. Sometimes he does not eat a meal during the day, but will eat chips or other snack and then eat a very large meal in the evening.   Discussed importance of eating 3 meals, which are smaller and not overeating in the evening.        Task: Reviewed the sources and role of Carbohydrate, Protein, and Fat and how each nutrient impacts blood sugar. Completed 9/15/2022        Task: Provided visual examples using dry measuring cups, food models, and other familiar objects such as computer mouse, deck or cards, tennis ball etc. to help with visualization of portions. Completed 9/15/2022        Task: Explained how to count carbohydrates using the food label and the use of dry measuring cups for accurate carb counting. Completed 9/15/2022        Task: Discussed strategies for choosing healthier menu options when dining out. Completed 9/15/2022        Task: Recommended replacing beverages containing high sugar content with noncaloric/sugar free options and/or water. Completed 9/15/2022        Task: Review the importance of balancing carbohydrates with  each meal using portion control techniques to count servings of carbohydrate and label reading to identify serving size and amount of total carbs per serving. Completed 9/15/2022        Problem: Blood Glucose Self-Monitoring         Goal: Patient agrees to check and record fasting BG and before each meal.    Start Date: 9/12/2022   Expected End Date: 3/15/2023   Priority: High   Barriers: Other (comments)   Note:    Pt has not been checking his BG daily because he works in a dirty environment as a highway .   Advised pt to check in am and before each meal so he can better determine how much insulin he should take before meals. He can use hand  or alcohol pads to clean his fingers.   Pt would benefit from CGM        Task: Reviewed the importance of self-monitoring blood glucose and keeping logs. Completed 9/15/2022        Task: Provided patient with blood glucose logs, reviewed appropriate timing and frequency to SMBG, education on parameters on when to notify provider and advised patient to bring logs to all appts with PCP/Endocrinologist/Diabetes Care Specialist. Completed 9/15/2022            Follow Up Plan     Follow up in about 5 weeks (around 10/17/2022).  Will evaluate goals, BG logs and diet.   Scheduled appt with renee for diabetes.     Today's care plan and follow up schedule was discussed with patient.  Rob verbalized understanding of the care plan, goals, and agrees to follow up plan.        The patient was encouraged to communicate with his/her health care provider/physician and care team regarding his/her condition(s) and treatment.  I provided the patient with my contact information today and encouraged to contact me via phone or [facility] Patient Portal as needed.     Length of Visit   Total Time: 60 Minutes

## 2022-09-12 NOTE — LETTER
September 15, 2022        Rudy Cochran MD  [address] [facility] [address]  [facility] [address]             [place] [facility]  [address] [place] [address]  Phone: [phone]  Fax: [phone]   Patient: Rob Marx   MR Number: [id number]   YOB: 1970   Date of Visit: 9/12/2022       Dear Dr. Cochran:    Thank you for referring Rob Marx to me for evaluation. Below are the relevant portions of my assessment and plan of care.       If you have questions, please do not hesitate to call me. I look forward to following Rob along with you.    Sincerely,      Tom Santos RD           CC  No Recipients

## 2022-09-12 NOTE — LETTER
September 12, 2022    Rob Marx  [address] [place] [address]             [facility]  Diabetes  [address] [place] [address]  Phone: [phone]  Fax: [phone]   September 12, 2022     Patient: Rob Marx   YOB: 1970   Date of Visit: 9/12/2022       To Whom it May Concern:    Rob Marx was seen in my clinic on 9/12/2022.     Please excuse him from any work missed.    If you have any questions or concerns, please don't hesitate to call.    Sincerely,         oTm Santos RD

## 2022-09-15 ENCOUNTER — TELEPHONE (OUTPATIENT)
Dept: DIABETES | Facility: CLINIC | Age: 52
End: 2022-09-15
Payer: COMMERCIAL

## 2022-09-15 NOTE — TELEPHONE ENCOUNTER
Multiple attempts made to contact pt about appt with dm management on 10/17. No answer for each attempt and voicemail is not set up. Mailed appt time change.

## 2022-09-22 ENCOUNTER — OFFICE VISIT (OUTPATIENT)
Dept: PRIMARY CARE CLINIC | Facility: CLINIC | Age: 52
End: 2022-09-22
Payer: COMMERCIAL

## 2022-09-22 VITALS
HEART RATE: 88 BPM | OXYGEN SATURATION: 96 % | HEIGHT: 71 IN | DIASTOLIC BLOOD PRESSURE: 80 MMHG | RESPIRATION RATE: 15 BRPM | SYSTOLIC BLOOD PRESSURE: 122 MMHG | WEIGHT: 169.31 LBS | BODY MASS INDEX: 23.7 KG/M2 | TEMPERATURE: 98 F

## 2022-09-22 DIAGNOSIS — Z12.11 COLON CANCER SCREENING: ICD-10-CM

## 2022-09-22 DIAGNOSIS — K42.9 UMBILICAL HERNIA WITHOUT OBSTRUCTION AND WITHOUT GANGRENE: Primary | ICD-10-CM

## 2022-09-22 DIAGNOSIS — R19.7 DIARRHEA, UNSPECIFIED TYPE: ICD-10-CM

## 2022-09-22 DIAGNOSIS — R15.9 INCONTINENCE OF FECES, UNSPECIFIED FECAL INCONTINENCE TYPE: ICD-10-CM

## 2022-09-22 PROCEDURE — 3052F PR MOST RECENT HEMOGLOBIN A1C LEVEL 8.0 - < 9.0%: ICD-10-PCS | Mod: CPTII,S$GLB,, | Performed by: FAMILY MEDICINE

## 2022-09-22 PROCEDURE — 4010F ACE/ARB THERAPY RXD/TAKEN: CPT | Mod: CPTII,S$GLB,, | Performed by: FAMILY MEDICINE

## 2022-09-22 PROCEDURE — 3079F PR MOST RECENT DIASTOLIC BLOOD PRESSURE 80-89 MM HG: ICD-10-PCS | Mod: CPTII,S$GLB,, | Performed by: FAMILY MEDICINE

## 2022-09-22 PROCEDURE — 3052F HG A1C>EQUAL 8.0%<EQUAL 9.0%: CPT | Mod: CPTII,S$GLB,, | Performed by: FAMILY MEDICINE

## 2022-09-22 PROCEDURE — 3074F PR MOST RECENT SYSTOLIC BLOOD PRESSURE < 130 MM HG: ICD-10-PCS | Mod: CPTII,S$GLB,, | Performed by: FAMILY MEDICINE

## 2022-09-22 PROCEDURE — 99215 PR OFFICE/OUTPT VISIT, EST, LEVL V, 40-54 MIN: ICD-10-PCS | Mod: S$GLB,,, | Performed by: FAMILY MEDICINE

## 2022-09-22 PROCEDURE — 3074F SYST BP LT 130 MM HG: CPT | Mod: CPTII,S$GLB,, | Performed by: FAMILY MEDICINE

## 2022-09-22 PROCEDURE — 99999 PR PBB SHADOW E&M-EST. PATIENT-LVL V: ICD-10-PCS | Mod: PBBFAC,,, | Performed by: FAMILY MEDICINE

## 2022-09-22 PROCEDURE — 3060F PR POS MICROALBUMINURIA RESULT DOCUMENTED/REVIEW: ICD-10-PCS | Mod: CPTII,S$GLB,, | Performed by: FAMILY MEDICINE

## 2022-09-22 PROCEDURE — 1159F PR MEDICATION LIST DOCUMENTED IN MEDICAL RECORD: ICD-10-PCS | Mod: CPTII,S$GLB,, | Performed by: FAMILY MEDICINE

## 2022-09-22 PROCEDURE — 3008F PR BODY MASS INDEX (BMI) DOCUMENTED: ICD-10-PCS | Mod: CPTII,S$GLB,, | Performed by: FAMILY MEDICINE

## 2022-09-22 PROCEDURE — 3066F PR DOCUMENTATION OF TREATMENT FOR NEPHROPATHY: ICD-10-PCS | Mod: CPTII,S$GLB,, | Performed by: FAMILY MEDICINE

## 2022-09-22 PROCEDURE — 1159F MED LIST DOCD IN RCRD: CPT | Mod: CPTII,S$GLB,, | Performed by: FAMILY MEDICINE

## 2022-09-22 PROCEDURE — 3079F DIAST BP 80-89 MM HG: CPT | Mod: CPTII,S$GLB,, | Performed by: FAMILY MEDICINE

## 2022-09-22 PROCEDURE — 99215 OFFICE O/P EST HI 40 MIN: CPT | Mod: S$GLB,,, | Performed by: FAMILY MEDICINE

## 2022-09-22 PROCEDURE — 3008F BODY MASS INDEX DOCD: CPT | Mod: CPTII,S$GLB,, | Performed by: FAMILY MEDICINE

## 2022-09-22 PROCEDURE — 99999 PR PBB SHADOW E&M-EST. PATIENT-LVL V: CPT | Mod: PBBFAC,,, | Performed by: FAMILY MEDICINE

## 2022-09-22 PROCEDURE — 4010F PR ACE/ARB THEARPY RXD/TAKEN: ICD-10-PCS | Mod: CPTII,S$GLB,, | Performed by: FAMILY MEDICINE

## 2022-09-22 PROCEDURE — 3060F POS MICROALBUMINURIA REV: CPT | Mod: CPTII,S$GLB,, | Performed by: FAMILY MEDICINE

## 2022-09-22 PROCEDURE — 3066F NEPHROPATHY DOC TX: CPT | Mod: CPTII,S$GLB,, | Performed by: FAMILY MEDICINE

## 2022-09-22 RX ORDER — LOPERAMIDE HYDROCHLORIDE 2 MG/1
2 CAPSULE ORAL 4 TIMES DAILY PRN
Qty: 40 CAPSULE | Refills: 0 | Status: SHIPPED | OUTPATIENT
Start: 2022-09-22 | End: 2022-12-19

## 2022-09-22 NOTE — LETTER
September 22, 2022    Rob Marx  [id number]  [place] [address]             [facility]  Primary Care  [address]  [place] [address]  Phone: [phone]  Fax: [phone]   September 22, 2022     Patient: Rob Marx   YOB: 1970   Date of Visit: 9/22/2022       To Whom it May Concern:    Rob Marx was seen in my clinic on 9/22/2022. He may return to work on 9/22/2022.    Please excuse him from any classes or work missed.    If you have any questions or concerns, please don't hesitate to call.    Sincerely,         Ric Lopez MD

## 2022-09-22 NOTE — PROGRESS NOTES
"    [facility]       [address] [place] [address]      Phone: [phone]      Fax: [phone]    Ric Lopez MD                Office Visit  09/22/2022        Subjective      HPI:  Rob Marx is a 52 y.o. male presents today in clinic for "Abdominal Pain (C/o stomach pains off/on for 'a few months')  ."     52-year-old gentleman presents today to discuss abdominal pain, diarrhea.      His wife, Ms. Fung, is present with him.  She provides portions of the history.      Patient states that he saw his PCP a few weeks ago.  When he did, he was told he had a small, umbilical hernia.  Feels like the hernia is getting bigger.  Still able to push it back in, but feels irritated, annoying.  He is also worried about the appearance.  Tends to stick out when he strains for a bowel movement, lifts heavy objects, eats too much, etc..      He also states that he is having stool issues.  Has been having diarrhea for several months.  Maybe 1/6 stools will be somewhat firm.  The rest are watery, loose.  Fairly good amount.  Every time he eats, within about 30 minutes he has to have a bowel movement.  Lately, he has started having issues with incontinence, especially at bedtime.  He has soiled the bed sheets on a number of occasions.  Other than the irritation around the hernia, he reports no abdominal pains.  No nausea, vomiting.  No fever, chills.  No blood in his stool.  No black, tarry stools.  Incidentally, has not had any colon cancer screening.      The following were updated and reviewed by myself in the chart: medications, past medical history, past surgical history, family history, social history, and allergies.     Medications:  Current Outpatient Medications on File Prior to Visit   Medication Sig Dispense Refill    amLODIPine (NORVASC) 5 MG tablet Take 1 tablet (5 mg total) by mouth once daily. 90 tablet 3    atorvastatin (LIPITOR) 40 MG tablet " "Take 1 tablet (40 mg total) by mouth once daily. 90 tablet 3    benazepriL (LOTENSIN) 40 MG tablet Take 1 tablet (40 mg total) by mouth once daily. 90 tablet 3    blood sugar diagnostic Strp 1 each by Misc.(Non-Drug; Combo Route) route 4 (four) times daily. Insurance preferred 100 strip 11    blood-glucose meter kit Use as instructed. Insurance preferred 1 each 0    esomeprazole (NEXIUM) 40 MG capsule Take 1 capsule (40 mg total) by mouth once daily. 90 capsule 3    insulin degludec (TRESIBA FLEXTOUCH U-200) 200 unit/mL (3 mL) insulin pen Inject 60 Units into the skin once daily. 15 pen 3    lancets Misc 1 each by Misc.(Non-Drug; Combo Route) route 4 (four) times daily. Insurance preferred 100 each 11    NOVOLOG FLEXPEN U-100 INSULIN 100 unit/mL (3 mL) InPn pen INJECT 10 TO 20 UNITS SUBCUTANEOUSLY THREE TIMES DAILY BEFORE MEALS 15 mL 11    pen needle, diabetic 32 gauge x 5/32" Ndle 1 each by Misc.(Non-Drug; Combo Route) route once daily. 100 each 0    propranoloL (INDERAL LA) 80 MG 24 hr capsule Take 1 capsule (80 mg total) by mouth once daily. 90 capsule 3    sildenafil (REVATIO) 20 mg Tab Take 1 tablet (20 mg total) by mouth 3 (three) times daily. 30 tablet 11     No current facility-administered medications on file prior to visit.        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 03/14/2022    Uncontrolled type 1 diabetes mellitus with hyperglycemia 04/09/2020    Tremor 02/20/2020    Chronic idiopathic gout involving toe 02/20/2020    Hypertension associated with diabetes 10/25/2018        PSHx:  Past Surgical History:   Procedure Laterality Date    CYST REMOVAL      age 18        FHx:  Family History   Problem Relation Age of Onset    Hypertension Mother     Diabetes Father     Diabetes Paternal Grandmother     " "Diabetes Paternal Grandfather         Social:  Social History     Socioeconomic History    Marital status:    Tobacco Use    Smoking status: Never    Smokeless tobacco: Current   Substance and Sexual Activity    Alcohol use: Yes    Drug use: No        Allergies:  Review of patient's allergies indicates:   Allergen Reactions    Benadryl [diphenhydramine hcl] Swelling        ROS:  Review of Systems   Constitutional:  Negative for activity change, appetite change, chills and fever.   HENT:  Negative for congestion, postnasal drip, rhinorrhea, sore throat and trouble swallowing.    Respiratory:  Negative for cough and shortness of breath.    Cardiovascular:  Negative for chest pain and palpitations.   Gastrointestinal:  Positive for abdominal distention, abdominal pain and diarrhea. Negative for blood in stool, constipation, nausea, rectal pain and vomiting.   Genitourinary:  Negative for difficulty urinating.   Musculoskeletal:  Negative for arthralgias and myalgias.   Skin:  Negative for color change and rash.   Neurological:  Negative for headaches.   All other systems reviewed and are negative.       Objective      /80   Pulse 88   Temp 97.9 °F (36.6 °C) (Temporal)   Resp 15   Ht 5' 11" (1.803 m)   Wt 76.8 kg (169 lb 5 oz)   SpO2 96%   BMI 23.61 kg/m²   Ht Readings from Last 3 Encounters:   09/22/22 5' 11" (1.803 m)   09/12/22 5' 11" (1.803 m)   08/31/22 5' 11" (1.803 m)     Wt Readings from Last 3 Encounters:   09/22/22 76.8 kg (169 lb 5 oz)   09/12/22 80.7 kg (177 lb 14.6 oz)   08/31/22 79 kg (174 lb 2.6 oz)       PHYSICAL EXAM:  Physical Exam  Vitals and nursing note reviewed.   Constitutional:       General: He is not in acute distress.     Appearance: Normal appearance.   HENT:      Head: Normocephalic and atraumatic.      Right Ear: Tympanic membrane, ear canal and external ear normal.      Left Ear: Tympanic membrane, ear canal and external ear normal.      Nose: Nose normal. No congestion " or rhinorrhea.      Mouth/Throat:      Mouth: Mucous membranes are moist.      Pharynx: Oropharynx is clear. No oropharyngeal exudate or posterior oropharyngeal erythema.   Eyes:      Extraocular Movements: Extraocular movements intact.      Conjunctiva/sclera: Conjunctivae normal.      Pupils: Pupils are equal, round, and reactive to light.   Cardiovascular:      Rate and Rhythm: Normal rate and regular rhythm.   Pulmonary:      Effort: Pulmonary effort is normal.      Breath sounds: No wheezing, rhonchi or rales.   Abdominal:      General: Abdomen is flat. Bowel sounds are normal.      Palpations: Abdomen is soft.      Tenderness: There is no abdominal tenderness. There is no guarding or rebound.      Hernia: A hernia is present. Hernia is present in the umbilical area.   Musculoskeletal:         General: Normal range of motion.      Cervical back: Normal range of motion.   Lymphadenopathy:      Cervical: No cervical adenopathy.   Skin:     General: Skin is warm and dry.   Neurological:      General: No focal deficit present.      Mental Status: He is alert.            LABS / IMAGING:  Recent Results (from the past 4368 hour(s))   Microalbumin/Creatinine Ratio, Urine    Collection Time: 08/26/22 12:12 PM   Result Value Ref Range    Microalbumin, Urine 67.0 ug/mL    Creatinine, Urine 67.0 23.0 - 375.0 mg/dL    Microalb/Creat Ratio 100.0 (H) 0.0 - 30.0 ug/mg   Hemoglobin A1C    Collection Time: 08/26/22 12:16 PM   Result Value Ref Range    Hemoglobin A1C 8.1 (H) 4.0 - 5.6 %    Estimated Avg Glucose 186 (H) 68 - 131 mg/dL         Assessment    1. Umbilical hernia without obstruction and without gangrene    2. Diarrhea, unspecified type    3. Incontinence of feces, unspecified fecal incontinence type    4. Colon cancer screening          Plan    Rob was seen today for abdominal pain.    Diagnoses and all orders for this visit:    Umbilical hernia without obstruction and without gangrene  -     Ambulatory  referral/consult to General Surgery; Future    Diarrhea, unspecified type  -     Ambulatory referral/consult to Gastroenterology; Future  -     Occult blood x 1, stool; Future  -     WBC, Stool; Future  -     Stool culture; Future  -     Giardia / Cryptosporidum, EIA; Future  -     Stool Exam-Ova,Cysts,Parasites; Future  -     Rotavirus antigen, stool; Future  -     Clostridium difficile EIA; Future  -     loperamide (IMODIUM) 2 mg capsule; Take 1 capsule (2 mg total) by mouth 4 (four) times daily as needed for Diarrhea.    Incontinence of feces, unspecified fecal incontinence type  -     Ambulatory referral/consult to Gastroenterology; Future  -     Occult blood x 1, stool; Future  -     WBC, Stool; Future  -     Stool culture; Future  -     Giardia / Cryptosporidum, EIA; Future  -     Stool Exam-Ova,Cysts,Parasites; Future  -     Rotavirus antigen, stool; Future  -     Clostridium difficile EIA; Future    Colon cancer screening  -     Ambulatory referral/consult to Gastroenterology; Future    Physically, appears to be okay.      Umbilical hernia is small, easily reducible.  Since he is concerned about the irritation, appearance, recommended referral to General surgery today to discuss repair options.      Not convinced diarrhea is connected to the hernia.  May be related to his diabetes.  Will get stool studies today.  Try loperamide.  Since he has never had colon cancer screening either, will place referral to GI today to discuss the chronic diarrhea, fecal incontinence, and colon cancer screening.      FOLLOW-UP:  Follow up if symptoms worsen or fail to improve.    I spent a total of 45 minutes face to face and non-face to face on the date of this visit.This includes time preparing to see the patient (eg, review of tests, notes), obtaining and/or reviewing additional history from an independent historian and/or outside medical records, documenting clinical information in the electronic health record,  independently interpreting results and/or communicating results to the patient/family/caregiver, or care coordinator.    Signed by:  Ric Lopez MD

## 2022-09-22 NOTE — PATIENT INSTRUCTIONS
Physically, everything looks pretty good today.      The umbilical hernia is small, but if it is bothering you we can certainly set you up to get it repaired.  Referral placed to General surgery today to discuss options.      I am more concerned with the diarrhea and fecal incontinence.  I am not convinced these two things are connected.      First, let us collect a stool sample to make sure that there is not some sort of infectious process going on.  Please collect the sample and return it to the lab asap.  We will contact you with the results as soon as they are available.      Next, we can try some loperamide, as needed, to see if that helps firm up the stools and prevent the incontinence.  Prescription sent to pharmacy today.    Next, would like you to follow-up with GI to discuss the diarrhea and incontinence, and to discuss colon cancer screening.  We should probably do all of this in one shot.

## 2022-09-26 ENCOUNTER — TELEPHONE (OUTPATIENT)
Dept: SURGERY | Facility: CLINIC | Age: 52
End: 2022-09-26
Payer: COMMERCIAL

## 2022-09-26 NOTE — TELEPHONE ENCOUNTER
Called to reschedule patient's 9/28/22 appointment with Dr. Garnica. No answer and no voice mail setup

## 2022-09-27 ENCOUNTER — TELEPHONE (OUTPATIENT)
Dept: SURGERY | Facility: CLINIC | Age: 52
End: 2022-09-27
Payer: COMMERCIAL

## 2022-09-27 ENCOUNTER — LAB VISIT (OUTPATIENT)
Dept: LAB | Facility: HOSPITAL | Age: 52
End: 2022-09-27
Attending: FAMILY MEDICINE
Payer: COMMERCIAL

## 2022-09-27 DIAGNOSIS — R19.7 DIARRHEA, UNSPECIFIED TYPE: ICD-10-CM

## 2022-09-27 DIAGNOSIS — R15.9 INCONTINENCE OF FECES, UNSPECIFIED FECAL INCONTINENCE TYPE: ICD-10-CM

## 2022-09-27 PROCEDURE — 87045 FECES CULTURE AEROBIC BACT: CPT | Performed by: FAMILY MEDICINE

## 2022-09-27 PROCEDURE — 87425 ROTAVIRUS AG IA: CPT | Performed by: FAMILY MEDICINE

## 2022-09-27 PROCEDURE — 87427 SHIGA-LIKE TOXIN AG IA: CPT | Mod: 59 | Performed by: FAMILY MEDICINE

## 2022-09-27 PROCEDURE — 87329 GIARDIA AG IA: CPT | Performed by: FAMILY MEDICINE

## 2022-09-27 PROCEDURE — 87046 STOOL CULTR AEROBIC BACT EA: CPT | Mod: 59 | Performed by: FAMILY MEDICINE

## 2022-09-27 NOTE — PROGRESS NOTES
Reason for Consult:  The primary encounter diagnosis was Uncontrolled type 1 diabetes mellitus with hyperglycemia. Diagnoses of Diarrhea, unspecified type, Incontinence of feces, unspecified fecal incontinence type, Colon cancer screening, and Encounter for screening colonoscopy were also pertinent to this visit.    PCP: Rudy Cochran   [address] / [place] [id number]    HPI:  This is a 52 y.o. male DM1, gout, HTN here for evaluation of chronic diarrhea     Complains   Large volume Diarrhea since 5 months with Weight loss 10 Lbs. No blood mucus. He mentions that its mainly post prandial and responded well to OTC medications. Every time he eats, within about 30 minutes he has to have a bowel movement.  Lately, he has started having issues with incontinence, especially at bedtime. No recent history of travel or change in medications or recurrent episode of diarrhea. No abdominal pain or cramps    H/oi Matti well controlled with PPI    He is Type I DM on insuline for > 15 years    He has umbilical hernia which is reducible. No colonoscopy in past    Reflux - yes  Dysphagia - no   GI bleeding - none  NSAID usage - none      ROS:  Constitutional: No fevers, chills, No weight loss  ENT: No allergies  CV: No chest pain  Pulm: No cough, No shortness of breath  Ophtho: No vision changes  GI: see HPI  Derm: No rash  Heme: No lymphadenopathy, No bruising  MSK: No arthritis  : No dysuria, No hematuria  Endo: No hot or cold intolerance  Neuro: No syncope, No seizure  Psych: No anxiety, No depression    Medical History:  has a past medical history of Diabetes mellitus type I and Hypertension.    Surgical History:  has a past surgical history that includes Cyst Removal.    Family History: family history includes Diabetes in his father, paternal grandfather, and paternal grandmother; Hypertension in his mother..     Social History:  reports that he has never smoked. His smokeless tobacco use includes snuff. He reports  "current alcohol use. He reports that he does not use drugs.    Review of patient's allergies indicates:   Allergen Reactions    Benadryl [diphenhydramine hcl] Swelling       Current Outpatient Rx   Medication Sig Dispense Refill    amLODIPine (NORVASC) 5 MG tablet Take 1 tablet (5 mg total) by mouth once daily. 90 tablet 3    atorvastatin (LIPITOR) 40 MG tablet Take 1 tablet (40 mg total) by mouth once daily. 90 tablet 3    benazepriL (LOTENSIN) 40 MG tablet Take 1 tablet (40 mg total) by mouth once daily. 90 tablet 3    blood sugar diagnostic Strp 1 each by Misc.(Non-Drug; Combo Route) route 4 (four) times daily. Insurance preferred 100 strip 11    blood-glucose meter kit Use as instructed. Insurance preferred 1 each 0    esomeprazole (NEXIUM) 40 MG capsule Take 1 capsule (40 mg total) by mouth once daily. 90 capsule 3    insulin degludec (TRESIBA FLEXTOUCH U-200) 200 unit/mL (3 mL) insulin pen Inject 60 Units into the skin once daily. 15 pen 3    lancets Misc 1 each by Misc.(Non-Drug; Combo Route) route 4 (four) times daily. Insurance preferred 100 each 11    loperamide (IMODIUM) 2 mg capsule Take 1 capsule (2 mg total) by mouth 4 (four) times daily as needed for Diarrhea. 40 capsule 0    NOVOLOG FLEXPEN U-100 INSULIN 100 unit/mL (3 mL) InPn pen INJECT 10 TO 20 UNITS SUBCUTANEOUSLY THREE TIMES DAILY BEFORE MEALS 15 mL 11    pen needle, diabetic 32 gauge x 5/32" Ndle 1 each by Misc.(Non-Drug; Combo Route) route once daily. 100 each 0    propranoloL (INDERAL LA) 80 MG 24 hr capsule Take 1 capsule (80 mg total) by mouth once daily. 90 capsule 3    sildenafil (REVATIO) 20 mg Tab Take 1 tablet (20 mg total) by mouth 3 (three) times daily. 30 tablet 11    sodium,potassium,mag sulfates (SUPREP BOWEL PREP KIT) 17.5-3.13-1.6 gram SolR Take 177 mLs by mouth once daily. for 2 days 1 kit 0       Objective Findings:    Vital Signs:  /82 (BP Location: Right arm, Patient Position: Sitting, BP Method: Medium (Manual))   " "Pulse 76   Ht 5' 11" (1.803 m)   Wt 79 kg (174 lb 2.6 oz)   SpO2 98%   BMI 24.29 kg/m²   Body mass index is 24.29 kg/m².    Physical Exam:  General Appearance: Well appearing in no acute distress  Head:   Normocephalic, without obvious abnormality  Eyes:    No scleral icterus, EOMI  ENT: Neck supple, Lips, mucosa, and tongue normal; teeth and gums normal  Lungs: CTA bilaterally in anterior and posterior fields, no wheezes, no crackles.  Heart:  Regular rate and rhythm, S1, S2 normal, no murmurs heard  Abdomen: Soft, non tender, non distended with positive bowel sounds in all four quadrants. No hepatosplenomegaly, ascites, or mass  Extremities: 2+ pulses, no clubbing, cyanosis or edema  Skin: No rash  Neurologic: CN II-XII intact      Labs:  Lab Results   Component Value Date    WBC 5.81 03/18/2022    HGB 17.4 03/18/2022    HCT 52.5 03/18/2022     03/18/2022    CHOL 230 (H) 03/18/2022    TRIG 178 (H) 03/18/2022    HDL 50 03/18/2022    ALT 24 03/18/2022    AST 28 03/18/2022     03/18/2022    K 4.1 03/18/2022     03/18/2022    CREATININE 1.1 03/18/2022    BUN 13 03/18/2022    CO2 24 03/18/2022    TSH 0.650 03/26/2019    PSA 0.53 03/18/2022    HGBA1C 8.1 (H) 08/26/2022         Assessment:  1. Uncontrolled type 1 diabetes mellitus with hyperglycemia    2. Diarrhea, unspecified type    3. Incontinence of feces, unspecified fecal incontinence type    4. Colon cancer screening    5. Encounter for screening colonoscopy      Plan:   Colonoscopy for colon cancer screen and evaluation of chronic diarrhea  Chronic diarrhea work up in process      Time Statement   Time Includes preparing to see patient, reviewing  diagnostic studies and records, direct face-to-face visit, completing orders, medications , reconciliation, prescription management, and care coordination    We discussed in depth the nature of the patient's disease, the management plan in details. I have provided the patient with an opportunity " to ask questions and have all questions answered to his satisfaction.       PIETER FRANCISCO MD  Gastroenterology & Transplant Hepatology  [facility] [place]

## 2022-09-27 NOTE — TELEPHONE ENCOUNTER
2nd attempt to contact patient to reschedule tomorrow's appointment. No answer and no voicemail set up

## 2022-09-28 ENCOUNTER — PATIENT MESSAGE (OUTPATIENT)
Dept: GASTROENTEROLOGY | Facility: CLINIC | Age: 52
End: 2022-09-28

## 2022-09-28 ENCOUNTER — OFFICE VISIT (OUTPATIENT)
Dept: GASTROENTEROLOGY | Facility: CLINIC | Age: 52
End: 2022-09-28
Payer: COMMERCIAL

## 2022-09-28 ENCOUNTER — TELEPHONE (OUTPATIENT)
Dept: SURGERY | Facility: CLINIC | Age: 52
End: 2022-09-28
Payer: COMMERCIAL

## 2022-09-28 VITALS
WEIGHT: 174.19 LBS | OXYGEN SATURATION: 98 % | HEIGHT: 71 IN | DIASTOLIC BLOOD PRESSURE: 82 MMHG | HEART RATE: 76 BPM | SYSTOLIC BLOOD PRESSURE: 132 MMHG | BODY MASS INDEX: 24.39 KG/M2

## 2022-09-28 DIAGNOSIS — R19.7 DIARRHEA, UNSPECIFIED TYPE: ICD-10-CM

## 2022-09-28 DIAGNOSIS — Z12.11 ENCOUNTER FOR SCREENING COLONOSCOPY: ICD-10-CM

## 2022-09-28 DIAGNOSIS — Z12.11 COLON CANCER SCREENING: ICD-10-CM

## 2022-09-28 DIAGNOSIS — E10.65 UNCONTROLLED TYPE 1 DIABETES MELLITUS WITH HYPERGLYCEMIA: Primary | ICD-10-CM

## 2022-09-28 DIAGNOSIS — R15.9 INCONTINENCE OF FECES, UNSPECIFIED FECAL INCONTINENCE TYPE: ICD-10-CM

## 2022-09-28 LAB — RV AG STL QL IA.RAPID: NEGATIVE

## 2022-09-28 PROCEDURE — 99204 OFFICE O/P NEW MOD 45 MIN: CPT | Mod: S$GLB,,, | Performed by: INTERNAL MEDICINE

## 2022-09-28 PROCEDURE — 3066F PR DOCUMENTATION OF TREATMENT FOR NEPHROPATHY: ICD-10-PCS | Mod: CPTII,S$GLB,, | Performed by: INTERNAL MEDICINE

## 2022-09-28 PROCEDURE — 3066F NEPHROPATHY DOC TX: CPT | Mod: CPTII,S$GLB,, | Performed by: INTERNAL MEDICINE

## 2022-09-28 PROCEDURE — 3060F PR POS MICROALBUMINURIA RESULT DOCUMENTED/REVIEW: ICD-10-PCS | Mod: CPTII,S$GLB,, | Performed by: INTERNAL MEDICINE

## 2022-09-28 PROCEDURE — 4010F ACE/ARB THERAPY RXD/TAKEN: CPT | Mod: CPTII,S$GLB,, | Performed by: INTERNAL MEDICINE

## 2022-09-28 PROCEDURE — 1159F PR MEDICATION LIST DOCUMENTED IN MEDICAL RECORD: ICD-10-PCS | Mod: CPTII,S$GLB,, | Performed by: INTERNAL MEDICINE

## 2022-09-28 PROCEDURE — 99204 PR OFFICE/OUTPT VISIT, NEW, LEVL IV, 45-59 MIN: ICD-10-PCS | Mod: S$GLB,,, | Performed by: INTERNAL MEDICINE

## 2022-09-28 PROCEDURE — 99999 PR PBB SHADOW E&M-EST. PATIENT-LVL V: CPT | Mod: PBBFAC,,, | Performed by: INTERNAL MEDICINE

## 2022-09-28 PROCEDURE — 3008F PR BODY MASS INDEX (BMI) DOCUMENTED: ICD-10-PCS | Mod: CPTII,S$GLB,, | Performed by: INTERNAL MEDICINE

## 2022-09-28 PROCEDURE — 1160F PR REVIEW ALL MEDS BY PRESCRIBER/CLIN PHARMACIST DOCUMENTED: ICD-10-PCS | Mod: CPTII,S$GLB,, | Performed by: INTERNAL MEDICINE

## 2022-09-28 PROCEDURE — 3060F POS MICROALBUMINURIA REV: CPT | Mod: CPTII,S$GLB,, | Performed by: INTERNAL MEDICINE

## 2022-09-28 PROCEDURE — 3052F PR MOST RECENT HEMOGLOBIN A1C LEVEL 8.0 - < 9.0%: ICD-10-PCS | Mod: CPTII,S$GLB,, | Performed by: INTERNAL MEDICINE

## 2022-09-28 PROCEDURE — 1160F RVW MEDS BY RX/DR IN RCRD: CPT | Mod: CPTII,S$GLB,, | Performed by: INTERNAL MEDICINE

## 2022-09-28 PROCEDURE — 3052F HG A1C>EQUAL 8.0%<EQUAL 9.0%: CPT | Mod: CPTII,S$GLB,, | Performed by: INTERNAL MEDICINE

## 2022-09-28 PROCEDURE — 3079F DIAST BP 80-89 MM HG: CPT | Mod: CPTII,S$GLB,, | Performed by: INTERNAL MEDICINE

## 2022-09-28 PROCEDURE — 4010F PR ACE/ARB THEARPY RXD/TAKEN: ICD-10-PCS | Mod: CPTII,S$GLB,, | Performed by: INTERNAL MEDICINE

## 2022-09-28 PROCEDURE — 3075F SYST BP GE 130 - 139MM HG: CPT | Mod: CPTII,S$GLB,, | Performed by: INTERNAL MEDICINE

## 2022-09-28 PROCEDURE — 3075F PR MOST RECENT SYSTOLIC BLOOD PRESS GE 130-139MM HG: ICD-10-PCS | Mod: CPTII,S$GLB,, | Performed by: INTERNAL MEDICINE

## 2022-09-28 PROCEDURE — 3079F PR MOST RECENT DIASTOLIC BLOOD PRESSURE 80-89 MM HG: ICD-10-PCS | Mod: CPTII,S$GLB,, | Performed by: INTERNAL MEDICINE

## 2022-09-28 PROCEDURE — 1159F MED LIST DOCD IN RCRD: CPT | Mod: CPTII,S$GLB,, | Performed by: INTERNAL MEDICINE

## 2022-09-28 PROCEDURE — 99999 PR PBB SHADOW E&M-EST. PATIENT-LVL V: ICD-10-PCS | Mod: PBBFAC,,, | Performed by: INTERNAL MEDICINE

## 2022-09-28 PROCEDURE — 3008F BODY MASS INDEX DOCD: CPT | Mod: CPTII,S$GLB,, | Performed by: INTERNAL MEDICINE

## 2022-09-28 RX ORDER — SODIUM, POTASSIUM,MAG SULFATES 17.5-3.13G
1 SOLUTION, RECONSTITUTED, ORAL ORAL DAILY
Qty: 1 KIT | Refills: 0 | Status: SHIPPED | OUTPATIENT
Start: 2022-09-28 | End: 2022-09-30

## 2022-09-28 NOTE — TELEPHONE ENCOUNTER
----- Message from Carmel Palmer sent at 9/28/2022  2:09 PM CDT -----  Patient arrived in [place] for his appointment with provider, says he may have missed your call because he was working night shift. Call to reschedule at [phone]. tc

## 2022-09-29 ENCOUNTER — PATIENT MESSAGE (OUTPATIENT)
Dept: ADMINISTRATIVE | Facility: HOSPITAL | Age: 52
End: 2022-09-29
Payer: COMMERCIAL

## 2022-09-29 ENCOUNTER — HOSPITAL ENCOUNTER (OUTPATIENT)
Dept: PREADMISSION TESTING | Facility: HOSPITAL | Age: 52
Discharge: HOME | End: 2022-09-29
Attending: INTERNAL MEDICINE
Payer: COMMERCIAL

## 2022-09-29 DIAGNOSIS — R19.7 DIARRHEA, UNSPECIFIED TYPE: ICD-10-CM

## 2022-09-29 LAB
CRYPTOSP AG STL QL IA: NEGATIVE
E COLI SXT1 STL QL IA: NEGATIVE
E COLI SXT2 STL QL IA: NEGATIVE
G LAMBLIA AG STL QL IA: NEGATIVE

## 2022-09-30 DIAGNOSIS — Z12.11 SCREENING FOR COLON CANCER: ICD-10-CM

## 2022-10-01 LAB — BACTERIA STL CULT: NORMAL

## 2022-10-05 NOTE — PROGRESS NOTES
MrSaeed Marx,     Looks like all of your stool studies are coming back normal.  Continue to follow-up with Gastroenterology, as scheduled.

## 2022-10-14 ENCOUNTER — PATIENT MESSAGE (OUTPATIENT)
Dept: HEPATOLOGY | Facility: CLINIC | Age: 52
End: 2022-10-14
Payer: COMMERCIAL

## 2022-10-14 ENCOUNTER — TELEPHONE (OUTPATIENT)
Dept: HEPATOLOGY | Facility: CLINIC | Age: 52
End: 2022-10-14
Payer: COMMERCIAL

## 2022-10-17 ENCOUNTER — OFFICE VISIT (OUTPATIENT)
Dept: DIABETES | Facility: CLINIC | Age: 52
End: 2022-10-17
Payer: COMMERCIAL

## 2022-10-17 ENCOUNTER — CLINICAL SUPPORT (OUTPATIENT)
Dept: DIABETES | Facility: CLINIC | Age: 52
End: 2022-10-17
Payer: COMMERCIAL

## 2022-10-17 VITALS — HEIGHT: 71 IN | BODY MASS INDEX: 24.23 KG/M2 | WEIGHT: 173.06 LBS

## 2022-10-17 DIAGNOSIS — E10.65 UNCONTROLLED TYPE 1 DIABETES MELLITUS WITH HYPERGLYCEMIA: Primary | ICD-10-CM

## 2022-10-17 DIAGNOSIS — E10.649 HYPOGLYCEMIA UNAWARENESS ASSOCIATED WITH TYPE 1 DIABETES MELLITUS: ICD-10-CM

## 2022-10-17 DIAGNOSIS — E10.3592 TYPE 1 DIABETES MELLITUS WITH PROLIFERATIVE RETINOPATHY OF LEFT EYE WITHOUT MACULAR EDEMA: Primary | ICD-10-CM

## 2022-10-17 PROCEDURE — 3052F HG A1C>EQUAL 8.0%<EQUAL 9.0%: CPT | Mod: CPTII,95,, | Performed by: NURSE PRACTITIONER

## 2022-10-17 PROCEDURE — G0108 DIAB MANAGE TRN  PER INDIV: HCPCS | Mod: S$GLB,,, | Performed by: DIETITIAN, REGISTERED

## 2022-10-17 PROCEDURE — 4010F PR ACE/ARB THEARPY RXD/TAKEN: ICD-10-PCS | Mod: CPTII,95,, | Performed by: NURSE PRACTITIONER

## 2022-10-17 PROCEDURE — 99999 PR PBB SHADOW E&M-EST. PATIENT-LVL II: ICD-10-PCS | Mod: PBBFAC,,, | Performed by: DIETITIAN, REGISTERED

## 2022-10-17 PROCEDURE — 3066F NEPHROPATHY DOC TX: CPT | Mod: CPTII,95,, | Performed by: NURSE PRACTITIONER

## 2022-10-17 PROCEDURE — 99999 PR PBB SHADOW E&M-EST. PATIENT-LVL II: CPT | Mod: PBBFAC,,, | Performed by: DIETITIAN, REGISTERED

## 2022-10-17 PROCEDURE — 3060F POS MICROALBUMINURIA REV: CPT | Mod: CPTII,95,, | Performed by: NURSE PRACTITIONER

## 2022-10-17 PROCEDURE — 99215 OFFICE O/P EST HI 40 MIN: CPT | Mod: 95,,, | Performed by: NURSE PRACTITIONER

## 2022-10-17 PROCEDURE — 99215 PR OFFICE/OUTPT VISIT, EST, LEVL V, 40-54 MIN: ICD-10-PCS | Mod: 95,,, | Performed by: NURSE PRACTITIONER

## 2022-10-17 PROCEDURE — 1160F RVW MEDS BY RX/DR IN RCRD: CPT | Mod: CPTII,95,, | Performed by: NURSE PRACTITIONER

## 2022-10-17 PROCEDURE — 3066F PR DOCUMENTATION OF TREATMENT FOR NEPHROPATHY: ICD-10-PCS | Mod: CPTII,95,, | Performed by: NURSE PRACTITIONER

## 2022-10-17 PROCEDURE — 3052F PR MOST RECENT HEMOGLOBIN A1C LEVEL 8.0 - < 9.0%: ICD-10-PCS | Mod: CPTII,95,, | Performed by: NURSE PRACTITIONER

## 2022-10-17 PROCEDURE — 4010F ACE/ARB THERAPY RXD/TAKEN: CPT | Mod: CPTII,95,, | Performed by: NURSE PRACTITIONER

## 2022-10-17 PROCEDURE — G0108 PR DIAB MANAGE TRN  PER INDIV: ICD-10-PCS | Mod: S$GLB,,, | Performed by: DIETITIAN, REGISTERED

## 2022-10-17 PROCEDURE — 3060F PR POS MICROALBUMINURIA RESULT DOCUMENTED/REVIEW: ICD-10-PCS | Mod: CPTII,95,, | Performed by: NURSE PRACTITIONER

## 2022-10-17 PROCEDURE — 1159F PR MEDICATION LIST DOCUMENTED IN MEDICAL RECORD: ICD-10-PCS | Mod: CPTII,95,, | Performed by: NURSE PRACTITIONER

## 2022-10-17 PROCEDURE — 1159F MED LIST DOCD IN RCRD: CPT | Mod: CPTII,95,, | Performed by: NURSE PRACTITIONER

## 2022-10-17 PROCEDURE — 1160F PR REVIEW ALL MEDS BY PRESCRIBER/CLIN PHARMACIST DOCUMENTED: ICD-10-PCS | Mod: CPTII,95,, | Performed by: NURSE PRACTITIONER

## 2022-10-17 RX ORDER — BLOOD-GLUCOSE TRANSMITTER
EACH MISCELLANEOUS
Qty: 1 EACH | Refills: 3 | Status: SHIPPED | OUTPATIENT
Start: 2022-10-17

## 2022-10-17 RX ORDER — BLOOD-GLUCOSE SENSOR
EACH MISCELLANEOUS
Qty: 9 EACH | Refills: 3 | Status: SHIPPED | OUTPATIENT
Start: 2022-10-17

## 2022-10-17 RX ORDER — INSULIN PMP CART,AUT,G6/7,CNTR
1 EACH SUBCUTANEOUS ONCE
Qty: 1 EACH | Refills: 0 | Status: SHIPPED | OUTPATIENT
Start: 2022-10-17 | End: 2022-10-17

## 2022-10-17 RX ORDER — GLUCAGON 3 MG/1
POWDER NASAL
Qty: 2 EACH | Refills: 3 | Status: SHIPPED | OUTPATIENT
Start: 2022-10-17

## 2022-10-17 RX ORDER — INSULIN DEGLUDEC 200 U/ML
50 INJECTION, SOLUTION SUBCUTANEOUS DAILY
Qty: 15 PEN | Refills: 3 | Status: SHIPPED | OUTPATIENT
Start: 2022-10-17 | End: 2022-12-07

## 2022-10-17 RX ORDER — INSULIN PMP CART,AUT,G6/7,CNTR
1 EACH SUBCUTANEOUS
Qty: 30 EACH | Refills: 3 | Status: SHIPPED | OUTPATIENT
Start: 2022-10-17 | End: 2023-05-10

## 2022-10-17 NOTE — LETTER
October 17, 2022    Rob Marx  [address] [place] [address]             [facility] Diabetes Education  Diabetes  [address] [place] [address]  Phone: [phone]  Fax: [phone]   October 17, 2022     Patient: Rob Marx   YOB: 1970   Date of Visit: 10/17/2022       To Whom it May Concern:    Rob Marx was seen in my clinic on 10/17/2022.     Please excuse him from any work missed.    If you have any questions or concerns, please don't hesitate to call.    Sincerely,         Tom Santos RD

## 2022-10-17 NOTE — Clinical Note
Needs in person appt at the [facility] on a Thurs.  Diabetes team: Referrals were sent for Dexcom G6 and Omni Pod 5 to [facility] on [facility]. Please check to see if these require a prior auth. Needs Dexcom especially ASAP due to hypo unawareness with severe hypoglycemia requiring EMS on multiple occasions.  Please also send a message to Dr. Barnhart's nurse pool to have scheduled for his overdue dilated eye exam with h/o proliferative

## 2022-10-17 NOTE — PROGRESS NOTES
"Diabetes Care Specialist Progress Note  Author: Tom Santos, CHRIS  Date: 10/19/2022    Program Intake  Reason for Diabetes Program Visit:: Intervention  Type of Intervention:: Individual  Individual: Education  Education: Self-Management Skill Review, Nutrition and Meal Planning    Lab Results   Component Value Date    HGBA1C 8.1 (H) 08/26/2022     CURRENT DM MEDICATIONS:   Tresiba, 60 units in am - taking between 5a-7a daily  Novolog, 10 units am, 10 or 20 units before dinner depending on carb intake   For example, he takes 20 units for pizza (which is mostly just toppings) and 10 units for lasagna      Clinical    Weight: 78.5 kg (173 lb 1 oz)   Height: 5' 11" (180.3 cm)   Body mass index is 24.14 kg/m².  Wt loss of 4 lbs since last visit on 9/12/2022    Pt eats large portions when he eats, and may only eat twice daily.   Pt sticks to eating only a few different meals:  Ramen noodles with sausage  Hot dogs with chili and cheese - only eats 1/2 of the bun; he will also have milk with this meal  If he eats pizza, he will eat one slice with the bottom crust and then only the toppings of 5-6 other slices  He eats very large portions of lasagna and drinks milk   Salami or bologna sandwiches     Physical activity/Exercise:   [employer] job, striping highways - works long hours    SMBG: checking 2-3 times daily  Not all fasting numbers are when he first wakes up. Some are more than 8 hours after eating, taken in am after he works nights.               Today's interventions were provided through individual discussion, instruction, and written materials were provided.      Patient verbalized understanding of instruction and written materials.  Pt was able to return back demonstration of instructions today. Patient understood key points, needs reinforcement and further instruction.     Diabetes Self-Management Care Plan:    Today's Diabetes Self-Management Care Plan was developed with Rob's input. Rob has agreed to work " toward the following goal(s) to improve his/her overall diabetes control.      Care Plan: Diabetes Management   Updates made since 9/19/2022 12:00 AM        Problem: Medications         Goal: Patient Agrees to take Diabetes Medication(s) as prescribed.    Start Date: 9/12/2022   Expected End Date: 3/15/2023   This Visit's Progress: Not met   Priority: Medium   Barriers: Other (comments)   Note:    Pt to always take Novolog before meals  Currently Novolog is prescribed 10-20 units before meals, depending on carbs in meal.   Reviewed carb counting so pt can make better decisions on how much insulin to take.   Reviewed examples - the way pt eats pizza is actually fairly low in carbs.   For lasagna, he was taking 10 units, but should actually take 20 units.        Problem: Healthy Eating         Goal: Eat 3 meals daily with 45-60g/3-4 servings of Carbohydrate per meal and low carb snacks.    Start Date: 9/12/2022   Expected End Date: 3/15/2023   This Visit's Progress: Not met   Priority: Low   Barriers: Other (comments)   Note:    Pt only eats a select few meals.   His diet is very high in saturated fat - suggested some changes that he can make.   Gave pt food record to keep track of his food intake and indicate how many grams of carb in each food. This will be necessary to be able to start pump. If he cannot accurately carb count, may need Omnipod DASH rather than Omnipod 5.        Problem: Blood Glucose Self-Monitoring         Goal: Patient agrees to check and record fasting BG and before each meal.    Start Date: 9/12/2022   Expected End Date: 3/15/2023   This Visit's Progress: On track   Priority: High   Barriers: Other (comments)   Note:    Doing much better with checking BG  He would greatly benefit from Dexcom CGM   Brigette since pt reports having to call EMS 12 times in the last 2 years due to hypoglycemia, which has been as low as 23.            Follow Up Plan     Follow up in about 2 weeks (around  10/31/2022).    Today's care plan and follow up schedule was discussed with patient.  Rob verbalized understanding of the care plan, goals, and agrees to follow up plan.        The patient was encouraged to communicate with his/her health care provider/physician and care team regarding his/her condition(s) and treatment.  I provided the patient with my contact information today and encouraged to contact me via phone or [facility] Patient Portal as needed.     Length of Visit   Total Time: 60 Minutes

## 2022-10-17 NOTE — PROGRESS NOTES
The patient location is: [place]  The chief complaint leading to consultation is: diabetes management- re-establish care    Visit type: audiovisual    Face to Face time with patient: 25 minutes  40 minutes of total time spent on the encounter, which includes face to face time and non-face to face time preparing to see the patient (eg, review of tests), Obtaining and/or reviewing separately obtained history, Documenting clinical information in the electronic or other health record, Independently interpreting results (not separately reported) and communicating results to the patient/family/caregiver, or Care coordination (not separately reported).         Each patient to whom he or she provides medical services by telemedicine is:  (1) informed of the relationship between the physician and patient and the respective role of any other health care provider with respect to management of the patient; and (2) notified that he or she may decline to receive medical services by telemedicine and may withdraw from such care at any time.    Notes:      Subjective:         Patient ID: Rob Marx is a 52 y.o. male.  Patient's current PCP is Rudy Cochran MD.     Chief Complaint: Diabetes Mellitus    HPI  Rob Marx is a 52 y.o. White male presenting for a new consult with me, previously seen by Ramona Munson  for diabetes. Patient has been diagnosed with type 1 diabetes since age 19 .  Received diabetes education: Yes- [facility], 2022 (Appt with Tom Santos today, 10/17/2022)    CURRENT DM MEDICATIONS:   Diabetes Medications               insulin degludec (TRESIBA FLEXTOUCH U-200) 200 unit/mL (3 mL) insulin pen Inject 60 Units into the skin once daily.    NOVOLOG FLEXPEN U-100 INSULIN 100 unit/mL (3 mL) InPn pen INJECT 10 TO 20 UNITS SUBCUTANEOUSLY THREE TIMES DAILY BEFORE MEALS             Past failed treatment include: None    Blood glucose testing is performed regularly. Patient is testing 3 times per  day.  Meter: Referral for Dexcom today  Preferred lab: [facility]    Any episodes of hypoglycemia? Yes    Complications related to diabetes: retinopathy    His blood sugar in the clinic today was:   Lab Results   Component Value Date    POCGLU 289 (A) 10/18/2019       Rob Marx presents today for follow up visit to discuss diabetes management. He has had an ambulance come to his house 12 times in the last 2 years due to severe hypoglycemia. Blood sugar of 23 mg/dL on the last occasion - this occurred about 6 months ago. Always overnight. Does not experience traditional symptoms of hypoglycemia until blood sugar is < 55. We discussed emergency glucagon and when appropriate to use- sending Baqsimi.   Working 16-18 hour days during the summer-hypoglycemia typically occurring during these months. Feels Bgs are higher in the winter as not as active.  Discussed MOA of long- vs short-acting insulins.     Last time he had a pump in 1990- [email or web address]. Interested in returning to pump therapy. 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 He is working towards carb counting and is agreeable to begin writing down his carbs.    Hypoglycemia unawareness- referrals for Dexcom G6 and Omni Pod 5 today.    Current diet: Eats about the same things every day. Lasagna,salami,hot dogs,ramen noodles.  Activity Level:     Lab Results   Component Value Date    HGBA1C 8.1 (H) 08/26/2022    HGBA1C 8.4 (H) 03/18/2022    HGBA1C 8.1 (H) 07/13/2021     STANDARDS OF CARE  Diabetes Management Status    Statin: Taking  ACE/ARB: Taking    Screening or Prevention Patient's value Goal Complete/Controlled?   HgA1C Testing and Control   Lab Results   Component Value Date    HGBA1C 8.1 (H) 08/26/2022      Annually/Less than 8% No     Lipid profile : 03/18/2022 Annually Yes     LDL control Lab Results   Component Value Date    LDLCALC 144.4 03/18/2022    Annually/Less than 100 mg/dl  No     Nephropathy screening Lab  Results   Component Value Date    LABMICR 67.0 08/26/2022     Lab Results   Component Value Date    PROTEINUA 1+ (A) 07/13/2021     No results found for: UTPCR   Annually Yes     Blood pressure BP Readings from Last 1 Encounters:   09/28/22 132/82    Less than 140/90 Yes     Dilated retinal exam : 08/03/2021 Annually No Overdue and advised to schedule.     Foot exam   : 03/11/2021 Annually No Deferred due to video visit        Labs reviewed and are noted below.    Lab Results   Component Value Date    WBC 5.81 03/18/2022    HGB 17.4 03/18/2022    HCT 52.5 03/18/2022     03/18/2022    CHOL 230 (H) 03/18/2022    TRIG 178 (H) 03/18/2022    HDL 50 03/18/2022    LDLCALC 144.4 03/18/2022    ALT 24 03/18/2022    AST 28 03/18/2022     03/18/2022    K 4.1 03/18/2022     03/18/2022    ANIONGAP 15 03/18/2022    CREATININE 1.1 03/18/2022    ESTGFRAFRICA >60.0 03/18/2022    EGFRNONAA >60.0 03/18/2022    BUN 13 03/18/2022    CO2 24 03/18/2022    TSH 0.650 03/26/2019    PSA 0.53 03/18/2022     (H) 03/18/2022     Lab Results   Component Value Date    GLUTAMICACID 0.00 01/25/2019    CPEPTIDE <0.08 (L) 01/25/2019    TSH 0.650 03/26/2019    CALCIUM 10.2 03/18/2022     Lab Results   Component Value Date    CPEPTIDE <0.08 (L) 01/25/2019     Lab Results   Component Value Date    GLUTAMICACID 0.00 01/25/2019     Glucose   Date Value Ref Range Status   03/18/2022 236 (H) 70 - 110 mg/dL Final     Anion Gap   Date Value Ref Range Status   03/18/2022 15 8 - 16 mmol/L Final     eGFR if    Date Value Ref Range Status   03/18/2022 >60.0 >60 mL/min/1.73 m^2 Final     eGFR if non    Date Value Ref Range Status   03/18/2022 >60.0 >60 mL/min/1.73 m^2 Final     Comment:     Calculation used to obtain the estimated glomerular filtration  rate (eGFR) is the CKD-EPI equation.          The following portions of the patient's history were reviewed and updated as appropriate: allergies, current  medications, past family history, past medical history, past social history, past surgical history, and problem list.    Review of patient's allergies indicates:   Allergen Reactions    Benadryl [diphenhydramine hcl] Swelling     Social History     Socioeconomic History    Marital status:    Tobacco Use    Smoking status: Never    Smokeless tobacco: Current     Types: Snuff   Substance and Sexual Activity    Alcohol use: Yes    Drug use: No     Past Medical History:   Diagnosis Date    Diabetes mellitus type I     Hypertension        REVIEW OF SYSTEMS:  Eyes History of DR and overdue for dilated eye exam.  Cardiovascular: History of HTN.  GI: Currently having workup for chronic diarrhea.   : No CKD.  Neuro: No neuropathy.  PSYCH: No tobacco use.  ENDO: See HPI.        Objective:      There were no vitals filed for this visit.  RESPIRATORY: No respiratory distress  NEUROLOGIC: not assessed-virtual visit  PSYCHIATRIC: Alert & oriented x3. Normal mood and affect.  FOOT EXAMINATION: Deferred- virtual visit.  Assessment:       1. Uncontrolled type 1 diabetes mellitus with hyperglycemia    2. Hypoglycemia unawareness associated with type 1 diabetes mellitus        Plan:   Uncontrolled type 1 diabetes mellitus with hyperglycemia  -     blood-glucose sensor (DEXCOM G6 SENSOR) Caitie; Change sensor every 10 days  Dispense: 9 each; Refill: 3  -     blood-glucose transmitter (DEXCOM G6 TRANSMITTER) Caitie; Change transmitter every 90 days  Dispense: 1 each; Refill: 3  -     insulin pump cart,auto,BT-cntr (OMNIPOD 5 G6 INTRO KIT, GEN 5,) Crtg; Inject 1 each into the skin once. for 1 dose  Dispense: 1 each; Refill: 0  -     insulin pump cart,automated,BT (OMNIPOD 5 G6 PODS, GEN 5,) Crtg; Inject 1 each into the skin Every 3 (three) days.  Dispense: 30 each; Refill: 3  -     glucagon (BAQSIMI) 3 mg/actuation Spry; Use 1 spray in 1 nostril once as needed for a severe low blood sugar unable to be treated by mouth.  Dispense: 2  each; Refill: 3    -- Medications adjusted for today's visit include:    Decrease Tresiba to 50 units once daily.    Continue Novolog as current- must take 10 minutes before the respective meal. Begin carb counting so we can move to dosing based on the carb content of each meal. Keep dietary notes.    -- Referral for Dexcom G6:      -Blood sugar goals should be a fasting blood sugar between , and no blood sugars throughout the day over 180 is good, less than 160 better, less than 140 perfect.    --Carry glucose tablets/soft peppermints/regular juice or Coke product with you at all times to treat a low blood sugar episode (less than 70). If your blood sugar is between 50-70, Chew 4 tablets or drink 1/2 cup of juice or regular Coke product. If your blood sugar is below 50, double the treatment. Re-check blood sugar in 15 minutes. If still low, repeat this. Always call the clinic to give an update for any low blood sugar episodes.    --Exercise as tolerated.    --Follow-up for your next visit in 4-6 weeks.    --Please either drop off, fax, or MyChart your readings to me as needed.     Hypoglycemia unawareness associated with type 1 diabetes mellitus  -     blood-glucose sensor (DEXCOM G6 SENSOR) Caitie; Change sensor every 10 days  Dispense: 9 each; Refill: 3  -     blood-glucose transmitter (DEXCOM G6 TRANSMITTER) Caitie; Change transmitter every 90 days  Dispense: 1 each; Refill: 3  -     insulin pump cart,auto,BT-cntr (OMNIPOD 5 G6 INTRO KIT, GEN 5,) Crtg; Inject 1 each into the skin once. for 1 dose  Dispense: 1 each; Refill: 0  -     insulin pump cart,automated,BT (OMNIPOD 5 G6 PODS, GEN 5,) Crtg; Inject 1 each into the skin Every 3 (three) days.  Dispense: 30 each; Refill: 3  -     glucagon (BAQSIMI) 3 mg/actuation Spry; Use 1 spray in 1 nostril once as needed for a severe low blood sugar unable to be treated by mouth.  Dispense: 2 each; Refill: 3    - Follow up: 1 month    I spent a total of 40 minutes on the  "day of the visit.This includes face to face time and non-face to face time preparing to see the patient (eg, review of tests), documenting clinical information in the electronic record, independently interpreting results and communicating results to the patient.    Portions of this note may have been created with voice recognition software. Occasional "wrong-word" or "sound-a-like" substitutions may have occurred due to the inherent limitations of voice recognition software. Please, read the note carefully and recognize, using context, where substitutions have occurred.           RICARDO Maya-C  [facility]     "

## 2022-10-17 NOTE — PATIENT INSTRUCTIONS
-- Medications adjusted for today's visit include:    Decrease Tresiba to 50 units once daily.    Continue Novolog as current- must take 10 minutes before the respective meal. Begin carb counting so we can move to dosing based on the carb content of each meal. Keep dietary notes.    -- Referral for Dexcom G6:      -Blood sugar goals should be a fasting blood sugar between , and no blood sugars throughout the day over 180 is good, less than 160 better, less than 140 perfect.    --Carry glucose tablets/soft peppermints/regular juice or Coke product with you at all times to treat a low blood sugar episode (less than 70). If your blood sugar is between 50-70, Chew 4 tablets or drink 1/2 cup of juice or regular Coke product. If your blood sugar is below 50, double the treatment. Re-check blood sugar in 15 minutes. If still low, repeat this. Always call the clinic to give an update for any low blood sugar episodes.    --Exercise as tolerated.    --Follow-up for your next visit in 4-6 weeks.    --Please either drop off, fax, or MyChart your readings to me as needed.

## 2022-10-18 ENCOUNTER — TELEPHONE (OUTPATIENT)
Dept: DIABETES | Facility: CLINIC | Age: 52
End: 2022-10-18
Payer: COMMERCIAL

## 2022-10-18 ENCOUNTER — PATIENT MESSAGE (OUTPATIENT)
Dept: ADMINISTRATIVE | Facility: HOSPITAL | Age: 52
End: 2022-10-18
Payer: COMMERCIAL

## 2022-10-18 NOTE — LETTER
October 18, 2022      [facility] [address]  [address] [facility]  [place] [address]  Phone: [phone]  Fax: [phone]       Patient: Rob Marx   YOB: 1970  Date of Visit: 10/17/2022    To Whom It May Concern:    Ilene Marx  was at [facility] on 10/18/2022. The patient may return to work/school on 10/18/2022 with no restrictions. If you have any questions or concerns, or if I can be of further assistance, please do not hesitate to contact me.    Sincerely,    BRENT Maya LPN

## 2022-10-18 NOTE — TELEPHONE ENCOUNTER
PA completed for OMNIPOD 5 G6 INTRO KIT, GEN 5 Authorized from October 18, 2022 to October 17, 2025

## 2022-10-18 NOTE — TELEPHONE ENCOUNTER
Returned pt call. Pt is requesting an excuse from yesterday's visit on 10/17/2022. Appointment verified  Work excuse printed and faxed to Mimi Stanton at [phone].

## 2022-10-18 NOTE — TELEPHONE ENCOUNTER
----- Message from Bibiana Wallis sent at 10/18/2022 10:14 AM CDT -----  Contact: [phone]  Type:  Patient Returning Call    Who Called:Rob   Who Left Message for Patient:nurse   Does the patient know what this is regarding?:n/a   Would the patient rather a call back or a response via [facility]? Call back   Best Call Back Number:[phone]  Additional Information: n/a      Thanks KB

## 2022-10-18 NOTE — TELEPHONE ENCOUNTER
----- Message from Aicha Walden sent at 10/17/2022 11:43 AM CDT -----  Contact: pt  Pt is calling rg wanting to have a Dr's note for his appt today faxed to  attn: Mimi King and pt can be reached at  or  - pt wife [email or web address]//[email or web address]

## 2022-10-19 ENCOUNTER — TELEPHONE (OUTPATIENT)
Dept: OPHTHALMOLOGY | Facility: CLINIC | Age: 52
End: 2022-10-19
Payer: COMMERCIAL

## 2022-10-19 NOTE — TELEPHONE ENCOUNTER
Called patient to schedule dm exam with DKT. Patient did not answer, no voicemail set up. ----- Message from Jayce KARIMI LPN sent at 10/19/2022  3:06 PM CDT -----  Can you please reach out to this patient to schedule his overdue dilated eye exam with h/o proliferative DR? Thank you.

## 2022-10-21 ENCOUNTER — TELEPHONE (OUTPATIENT)
Dept: HEPATOLOGY | Facility: CLINIC | Age: 52
End: 2022-10-21
Payer: COMMERCIAL

## 2022-11-28 ENCOUNTER — PATIENT MESSAGE (OUTPATIENT)
Dept: DIABETES | Facility: CLINIC | Age: 52
End: 2022-11-28
Payer: COMMERCIAL

## 2022-11-28 ENCOUNTER — TELEPHONE (OUTPATIENT)
Dept: DIABETES | Facility: CLINIC | Age: 52
End: 2022-11-28
Payer: COMMERCIAL

## 2022-11-28 DIAGNOSIS — E10.65 UNCONTROLLED TYPE 1 DIABETES MELLITUS WITH HYPERGLYCEMIA: Primary | ICD-10-CM

## 2022-11-28 DIAGNOSIS — E10.649 HYPOGLYCEMIA UNAWARENESS ASSOCIATED WITH TYPE 1 DIABETES MELLITUS: ICD-10-CM

## 2022-11-30 ENCOUNTER — PATIENT OUTREACH (OUTPATIENT)
Dept: ADMINISTRATIVE | Facility: HOSPITAL | Age: 52
End: 2022-11-30
Payer: COMMERCIAL

## 2022-12-07 ENCOUNTER — TELEPHONE (OUTPATIENT)
Dept: DIABETES | Facility: CLINIC | Age: 52
End: 2022-12-07
Payer: COMMERCIAL

## 2022-12-07 DIAGNOSIS — E10.65 UNCONTROLLED TYPE 1 DIABETES MELLITUS WITH HYPERGLYCEMIA: Primary | ICD-10-CM

## 2022-12-07 RX ORDER — INSULIN ASPART 100 [IU]/ML
INJECTION, SOLUTION INTRAVENOUS; SUBCUTANEOUS
Qty: 20 ML | Refills: 11 | Status: SHIPPED | OUTPATIENT
Start: 2022-12-07 | End: 2023-05-10 | Stop reason: ALTCHOICE

## 2022-12-07 RX ORDER — INSULIN DEGLUDEC 200 U/ML
INJECTION, SOLUTION SUBCUTANEOUS
Qty: 15 PEN | Refills: 2 | Status: SHIPPED | OUTPATIENT
Start: 2022-12-07 | End: 2023-05-10 | Stop reason: SDUPTHER

## 2022-12-07 NOTE — TELEPHONE ENCOUNTER
Please call patient: I sent Novolog vials to his pharmacy on file. Bring 1 vial to his training. Hold Tresiba dose today.     Amy,here are the orders!        Prescription sent for Omni Pod 5 insulin pump and vials of Novolog  Auto mode: On  Has seen diabetes education for pump evaluation and will be scheduled for pump training and start 12/8/2022.     Pump Settings  Basal Rate  12A:1.6 units/hour  Max basal rate: 2 units/hour     Carb Ratio  12A:11   Max carb bolus:  15 units     ISF  12A:45   Reverse correction: OFF     Target: 130  Correct above: 130  Active insulin time: 4 hours

## 2022-12-07 NOTE — TELEPHONE ENCOUNTER
Attempted to call Mr. Marx twice to confirm pump and CGM training for 12/08 at 8am. No answer and unable to leave voicemail. Will send message.

## 2022-12-08 ENCOUNTER — PATIENT MESSAGE (OUTPATIENT)
Dept: DIABETES | Facility: CLINIC | Age: 52
End: 2022-12-08
Payer: COMMERCIAL

## 2022-12-15 ENCOUNTER — TELEPHONE (OUTPATIENT)
Dept: DIABETES | Facility: CLINIC | Age: 52
End: 2022-12-15
Payer: COMMERCIAL

## 2022-12-15 NOTE — TELEPHONE ENCOUNTER
Pt reports he still has not received vial of insulin bc [facility] is saying rx has not been sent in. He needs to bring it to his appt on Tuesday. Informed pt that rx was sent to [facility] and PA has been approved.     ----- Message from Mariam Knowles sent at 12/15/2022  9:49 AM CST -----  Pt would like the nurse to call him back regarding insulin. Call back number is [phone]. Thx. EL

## 2022-12-19 ENCOUNTER — PATIENT OUTREACH (OUTPATIENT)
Dept: ADMINISTRATIVE | Facility: HOSPITAL | Age: 52
End: 2022-12-19
Payer: COMMERCIAL

## 2022-12-19 ENCOUNTER — TELEPHONE (OUTPATIENT)
Dept: DIABETES | Facility: CLINIC | Age: 52
End: 2022-12-19
Payer: COMMERCIAL

## 2022-12-19 NOTE — TELEPHONE ENCOUNTER
Called to confirm appt for 12/20 @ 12:30. Reminded pt to bring all supplies including insulin and to hold Tresiba in the AM. Successful call.

## 2022-12-20 ENCOUNTER — PATIENT MESSAGE (OUTPATIENT)
Dept: DIABETES | Facility: CLINIC | Age: 52
End: 2022-12-20

## 2022-12-20 ENCOUNTER — CLINICAL SUPPORT (OUTPATIENT)
Dept: DIABETES | Facility: CLINIC | Age: 52
End: 2022-12-20
Payer: COMMERCIAL

## 2022-12-20 DIAGNOSIS — E10.65 UNCONTROLLED TYPE 1 DIABETES MELLITUS WITH HYPERGLYCEMIA: ICD-10-CM

## 2022-12-20 DIAGNOSIS — E10.649 HYPOGLYCEMIA UNAWARENESS ASSOCIATED WITH TYPE 1 DIABETES MELLITUS: ICD-10-CM

## 2022-12-20 PROCEDURE — G0108 PR DIAB MANAGE TRN  PER INDIV: ICD-10-PCS | Mod: S$GLB,,,

## 2022-12-20 PROCEDURE — 99999 PR PBB SHADOW E&M-EST. PATIENT-LVL I: CPT | Mod: PBBFAC,,,

## 2022-12-20 PROCEDURE — 99999 PR PBB SHADOW E&M-EST. PATIENT-LVL I: ICD-10-PCS | Mod: PBBFAC,,,

## 2022-12-20 PROCEDURE — G0108 DIAB MANAGE TRN  PER INDIV: HCPCS | Mod: S$GLB,,,

## 2022-12-20 NOTE — PROGRESS NOTES
Diabetes Care Specialist Progress Note  Author: Amy Will RN  Date: 12/20/2022    Program Intake  Reason for Diabetes Program Visit:: Intervention  Type of Intervention:: Individual  Individual: Device Training  Device Training: Insulin Pump Start, Personal CGM  Current diabetes risk level:: moderate  In the last 12 months, have you:: none  Permission to speak with others about care:: no    Lab Results   Component Value Date    HGBA1C 8.1 (H) 08/26/2022       Diabetes Self-Management Skills Assessment    Medications  Patient is able to describe current diabetes management routine.: yes  Diabetes management routine:: insulin  Patient is able to identify current diabetes medications, dosages, and appropriate timing of medications.: no  Patient understands the purpose of the medications taken for diabetes.: yes  Patient reports problems or concerns with current medication regimen.: yes  Medication regimen problems/concerns:: lack of training (Omnipod 5 training today)  Medication Skills Assessment Completed:: Yes  Assessment indicates:: Knowledge deficit, Instruction Needed  Area of need?: Yes    Home Blood Glucose Monitoring  Patient states that blood sugar is checked at home daily.: no  Reasons for not monitoring:: needs training  Home Blood Glucose Monitoring Skills Assessment Completed: : Yes  Assessment indicates:: Knowledge deficit, Instruction Needed  Area of need?: Yes    Assessment Summary and Plan    Based on today's diabetes care assessment, the following areas of need were identified:      Social 9/12/2022   Support No   Access to Mass Media/Tech No   Cognitive/Behavioral Health No   Culture/Jewish No   Communication No   Health Literacy No        Clinical 9/12/2022   Medication Adherence No   Lab Compliance No   Nutritional Status No        Diabetes Self-Management Skills 12/20/2022   Medication Yes-See care plan.     Home Blood Glucose Monitoring Yes-See care plan.            Today's  interventions were provided through individual discussion, instruction, and written materials were provided.      Patient verbalized understanding of instruction and written materials.  Pt was able to return back demonstration of instructions today. Patient understood key points, needs reinforcement and further instruction.     Diabetes Self-Management Care Plan:    Today's Diabetes Self-Management Care Plan was developed with Rob's input. Rob has agreed to work toward the following goal(s) to improve his/her overall diabetes control.      Care Plan: Diabetes Management   Updates made since 11/20/2022 12:00 AM        Problem: Medications         Goal: Patient Agrees to take Diabetes Medication(s) as prescribed.    Start Date: 9/12/2022   Expected End Date: 3/15/2023   This Visit's Progress: On track   Recent Progress: Not met   Priority: Medium   Barriers: Other (comments)   Note:    Pt to always take Novolog before meals  Currently Novolog is prescribed 10-20 units before meals, depending on carbs in meal.   Reviewed carb counting so pt can make better decisions on how much insulin to take.   Reviewed examples - the way pt eats pizza is actually fairly low in carbs.   For lasagna, he was taking 10 units, but should actually take 20 units.     12/20/22:   OMNI POD 5 INSULIN PUMP START  Pump training was provided per Omni Pod protocol. Provider orders with insulin rates and training checklist saved media.     Details of pump therapy were covered included following: controller features and programming, pod activation, pod site selection and rotation, automatic pod priming and insertion, setting & editing basal rates in manual mode, giving bolus and other features in the set up menu.  Patient demonstrated ability to program controller, activate and insert pod using aseptic technique.  Patient demonstrated ability to program Dexcom transmitter into controller and start automated limited mode.    Instructed pt on  "use of basic pump features ie...give a bolus, pause insulin, switch from manual to automated mode.  Reviewed features available in manual mode verses automated mode.   Reviewed when and how to use activity function in automated mode.  Reviewed site selection of pods, rotation of sites and hard stop to change pod every 72 hrs.   Instructed that insulin vial is good out of refrigeration for up to 28-30 days .   Reviewed treatment of hypoglycemia, hyperglycemia; sick day care, DKA, and troubleshooting of pump.  Omni Pod 24 hour support line provided.   Setup Omnipod and Glooko & linked to clinic  Reminded pt to set up WiFi when he gets home    INITIAL SETTINGS :  Basal rate: 12a-12a 1.6 units/hr  Maximum basal: 2 units/hr     Bolus Menu:  ISF: 1:45  Carb Ratio : 1:11 grams  Blood glucose target: 130 mg/dL; correct above: 130 mg/dL  Active insulin: 4 hrs  Maximum bolus: 15 units  Reverse Correction: Off         Problem: Blood Glucose Self-Monitoring         Goal: Patient agrees to check and record fasting BG and before each meal.    Start Date: 9/12/2022   Expected End Date: 3/15/2023   This Visit's Progress: On track   Recent Progress: On track   Priority: High   Barriers: Other (comments)   Note:    Doing much better with checking BG  He would greatly benefit from Dexcom CGM   Brigette since pt reports having to call EMS 12 times in the last 2 years due to hypoglycemia, which has been as low as 23.     12/20/22:   DEXCOM G6 CGM TRAINING  Dexcom G6 & clarity mobile apps downloaded to phone. Education was provided using "Quick Start Guide" per Dexcom protocol. Clarity clinic data share set-up.      Overview:  5min glucose reading updates, trending arrows, BG graph screens, battery life indicator, Blue Tooth Symbol.  Menus: trend Graph, start sensor, enter BG, events, Alerts, Settings, Shutdown, Stop Sensor   Settings:                          * Urgent Low: 55 mg/dL                          * Urgent Low Soon: On       "                    * Low alert: 70 mg/dL                          * High alert: 250 mg/dL                          * Rise rate: Off                          * Fall Rate: On                          * Signal Loss: On                          * No Reading: On     Reviewed additional setting options.   Pt paired sensor and transmitter with .    Reviewed where to find sensor insertion time and sensor expiration date.   Reviewed appropriate calibration techniques.  Reviewed sensor site selection. Pt selected and prepped site using aseptic technique, inserted sensor and transmitter and started session.   Practiced sensor pod/transmitter removal from site, and removal of transmitter from sensor pod.  Patient able to demonstrate without difficulty.  Encouraged to review manual prior to starting another sensor.   Reviewed problem solving aspects of sensor transmission/ variables that can disrupt RF transmission.  range 20 feet, but the first 3 hrs keep within 3 feet of transmitter.  Pt instructed on lag time of interstitial fluid from CBG and was advised to tx hypoglycemia and dose insulin based on SMBG values.  Dexcom technical support contact number given and examples of when to contact them discussed.             Follow Up Plan     Follow up in about 2 weeks (around 1/3/2023).    Today's care plan and follow up schedule was discussed with patient.  Rob verbalized understanding of the care plan, goals, and agrees to follow up plan.        The patient was encouraged to communicate with his/her health care provider/physician and care team regarding his/her condition(s) and treatment.  I provided the patient with my contact information today and encouraged to contact me via phone or [facility] as needed.     Length of Visit   Total Time: 150 Minutes

## 2022-12-21 ENCOUNTER — TELEPHONE (OUTPATIENT)
Dept: DIABETES | Facility: CLINIC | Age: 52
End: 2022-12-21
Payer: COMMERCIAL

## 2022-12-21 ENCOUNTER — PATIENT OUTREACH (OUTPATIENT)
Dept: DIABETES | Facility: CLINIC | Age: 52
End: 2022-12-21
Payer: COMMERCIAL

## 2022-12-21 NOTE — PROGRESS NOTES
"Reached out to Mr. Marx after start of OP5 & Dexcom.   He states his Dexcom alarm went off for a low of 68, he drank a coke & ate a cupcake, then the alarm went off for a high of 251. He also reports an fall rate alarm but his BG was only 134, and he did not feel bad.   RN reinforced the 15-15 rule: Eat 15 grams of fast acting sugar, then wait 15 minutes and re-check. Educated that eating or drinking too much can cause rebound hyperglycemia. He understood.   Encouraged him to keep alarms on so he can be aware of his blood sugars; he stated he will not turn them off.     RN asked about his OP5. Pt states he didn't have any problems giving himself insulin using the device; he states it's "pretty easy to use".   Attempted to look at Glooko but pt is not connected to WiFi; he mentioned needing to wait for his wife to help him.     Encouraged him to reach out with any concerns.   He requested a work excuse; sent one to his email.     Successful call.    Time spent: 5 minutes.     "

## 2022-12-21 NOTE — PROGRESS NOTES
After speaking with Brionna Emmanuel NP about pt's blood sugar dropping, she ordered for the basal rate to be decreased by 10%.   Basal rate decreased from 1.6 units/hr to 1.4 units/hr.   Called Mr. Marx and walked him through step-by-step how to change his basal rate.   Encouraged him to call with any concerns.   Will follow-up with him Thursday.   Reminded him to connect PDM to [employer].   Successful call.

## 2022-12-21 NOTE — TELEPHONE ENCOUNTER
Returned pt's call per his request. He is concerned about his Dexcom alarms going off with 2 arrows down even without giving insulin. Told pt I would talk with provider and call him with changes. Successful call.

## 2022-12-23 ENCOUNTER — PATIENT OUTREACH (OUTPATIENT)
Dept: DIABETES | Facility: CLINIC | Age: 52
End: 2022-12-23
Payer: COMMERCIAL

## 2022-12-27 ENCOUNTER — PATIENT OUTREACH (OUTPATIENT)
Dept: ADMINISTRATIVE | Facility: HOSPITAL | Age: 52
End: 2022-12-27
Payer: COMMERCIAL

## 2022-12-27 ENCOUNTER — TELEPHONE (OUTPATIENT)
Dept: DIABETES | Facility: CLINIC | Age: 52
End: 2022-12-27
Payer: COMMERCIAL

## 2022-12-27 NOTE — PROGRESS NOTES
"Successfully walked patient through setting up new pod via telephone call.   Pt sent message later stating the PDM was showing "Transmitter not found"  Sent him step-by-step instructions on how to re-enter Transmitter ID.     Time Spent: 12 minutes    "

## 2023-01-03 ENCOUNTER — TELEPHONE (OUTPATIENT)
Dept: DIABETES | Facility: CLINIC | Age: 53
End: 2023-01-03
Payer: COMMERCIAL

## 2023-01-03 NOTE — TELEPHONE ENCOUNTER
"Returned patient's message from 12/30 with a call (did not get the pt's message until today 1/03). Pt was explaining he received a "bad batch" of Dexcom sensors but he called [employer] and they will be replacing the 2 sensors that failed. Briefly reviewed how things were going with OP5. He states things are going well; reports his highest BG as 270 and his lowest as 80 (did not confirm this). Reinforced the importance of carb counting; he states he's doing better with it. Rescheduled follow-up from today's missed appt.   "

## 2023-01-11 ENCOUNTER — PATIENT OUTREACH (OUTPATIENT)
Dept: ADMINISTRATIVE | Facility: HOSPITAL | Age: 53
End: 2023-01-11
Payer: COMMERCIAL

## 2023-01-12 ENCOUNTER — PATIENT MESSAGE (OUTPATIENT)
Dept: DIABETES | Facility: CLINIC | Age: 53
End: 2023-01-12
Payer: COMMERCIAL

## 2023-01-12 ENCOUNTER — PATIENT OUTREACH (OUTPATIENT)
Dept: DIABETES | Facility: CLINIC | Age: 53
End: 2023-01-12
Payer: COMMERCIAL

## 2023-01-12 NOTE — PROGRESS NOTES
Called pt to r/s his appointment for diabetes education since educator had to leave early.   Pt stated that he has been having problems with his Dexcom sensors. Dexcom has replaced a few. The one he has on now is also giving him trouble and not connecting.   He will try a new one from a different batch. Reminded him to listen for 2 clicks when he snaps the transmitter into the sensor.     Reviewed Glooko report and noted manual mode use due to sensor issues.   Will f/u with pt again to make sure Dexcom works.   Pt may need adjustments in settings - sending report to Brionna Emmanuel.

## 2023-01-12 NOTE — Clinical Note
Since Amy had to leave early, I spoke to Mr Rivas. You can see in the report that he has had issues with Dexcom and we talked about that. He does have extra sensors.  I noticed that when he tries to correct high BG, it almost looks like he's not getting any extra insulin, or he needs a more aggressive ISF. Report is in media

## 2023-01-23 ENCOUNTER — PATIENT MESSAGE (OUTPATIENT)
Dept: DIABETES | Facility: CLINIC | Age: 53
End: 2023-01-23
Payer: COMMERCIAL

## 2023-01-25 ENCOUNTER — LAB VISIT (OUTPATIENT)
Dept: LAB | Facility: HOSPITAL | Age: 53
End: 2023-01-25
Attending: NURSE PRACTITIONER
Payer: COMMERCIAL

## 2023-01-25 ENCOUNTER — CLINICAL SUPPORT (OUTPATIENT)
Dept: DIABETES | Facility: CLINIC | Age: 53
End: 2023-01-25
Payer: COMMERCIAL

## 2023-01-25 ENCOUNTER — OFFICE VISIT (OUTPATIENT)
Dept: DIABETES | Facility: CLINIC | Age: 53
End: 2023-01-25
Payer: COMMERCIAL

## 2023-01-25 ENCOUNTER — PATIENT MESSAGE (OUTPATIENT)
Dept: ADMINISTRATIVE | Facility: HOSPITAL | Age: 53
End: 2023-01-25
Payer: COMMERCIAL

## 2023-01-25 VITALS
RESPIRATION RATE: 18 BRPM | WEIGHT: 173.06 LBS | HEIGHT: 71 IN | DIASTOLIC BLOOD PRESSURE: 94 MMHG | BODY MASS INDEX: 24.23 KG/M2 | SYSTOLIC BLOOD PRESSURE: 147 MMHG | HEART RATE: 76 BPM

## 2023-01-25 DIAGNOSIS — E10.649 HYPOGLYCEMIA UNAWARENESS ASSOCIATED WITH TYPE 1 DIABETES MELLITUS: ICD-10-CM

## 2023-01-25 DIAGNOSIS — E10.65 UNCONTROLLED TYPE 1 DIABETES MELLITUS WITH HYPERGLYCEMIA: Primary | ICD-10-CM

## 2023-01-25 DIAGNOSIS — Z96.41 INSULIN PUMP IN PLACE: ICD-10-CM

## 2023-01-25 DIAGNOSIS — E10.3592 TYPE 1 DIABETES MELLITUS WITH PROLIFERATIVE RETINOPATHY OF LEFT EYE WITHOUT MACULAR EDEMA: ICD-10-CM

## 2023-01-25 DIAGNOSIS — E10.65 UNCONTROLLED TYPE 1 DIABETES MELLITUS WITH HYPERGLYCEMIA: ICD-10-CM

## 2023-01-25 LAB
ALBUMIN SERPL BCP-MCNC: 4 G/DL (ref 3.5–5.2)
ALP SERPL-CCNC: 87 U/L (ref 55–135)
ALT SERPL W/O P-5'-P-CCNC: 32 U/L (ref 10–44)
ANION GAP SERPL CALC-SCNC: 11 MMOL/L (ref 8–16)
AST SERPL-CCNC: 32 U/L (ref 10–40)
BILIRUB SERPL-MCNC: 0.9 MG/DL (ref 0.1–1)
BUN SERPL-MCNC: 10 MG/DL (ref 6–20)
CALCIUM SERPL-MCNC: 9.8 MG/DL (ref 8.7–10.5)
CHLORIDE SERPL-SCNC: 104 MMOL/L (ref 95–110)
CHOLEST SERPL-MCNC: 140 MG/DL (ref 120–199)
CHOLEST/HDLC SERPL: 3 {RATIO} (ref 2–5)
CO2 SERPL-SCNC: 24 MMOL/L (ref 23–29)
CREAT SERPL-MCNC: 1.1 MG/DL (ref 0.5–1.4)
EST. GFR  (NO RACE VARIABLE): >60 ML/MIN/1.73 M^2
ESTIMATED AVG GLUCOSE: 180 MG/DL (ref 68–131)
GLUCOSE SERPL-MCNC: 133 MG/DL (ref 70–110)
GLUCOSE SERPL-MCNC: 134 MG/DL (ref 70–110)
HBA1C MFR BLD: 7.9 % (ref 4–5.6)
HDLC SERPL-MCNC: 47 MG/DL (ref 40–75)
HDLC SERPL: 33.6 % (ref 20–50)
LDLC SERPL CALC-MCNC: 70.6 MG/DL (ref 63–159)
NONHDLC SERPL-MCNC: 93 MG/DL
POTASSIUM SERPL-SCNC: 4.4 MMOL/L (ref 3.5–5.1)
PROT SERPL-MCNC: 7.1 G/DL (ref 6–8.4)
SODIUM SERPL-SCNC: 139 MMOL/L (ref 136–145)
TRIGL SERPL-MCNC: 112 MG/DL (ref 30–150)
TSH SERPL DL<=0.005 MIU/L-ACNC: 0.77 UIU/ML (ref 0.4–4)

## 2023-01-25 PROCEDURE — 1159F PR MEDICATION LIST DOCUMENTED IN MEDICAL RECORD: ICD-10-PCS | Mod: CPTII,S$GLB,, | Performed by: NURSE PRACTITIONER

## 2023-01-25 PROCEDURE — 3077F SYST BP >= 140 MM HG: CPT | Mod: CPTII,S$GLB,, | Performed by: NURSE PRACTITIONER

## 2023-01-25 PROCEDURE — 99999 PR PBB SHADOW E&M-EST. PATIENT-LVL IV: ICD-10-PCS | Mod: PBBFAC,,, | Performed by: NURSE PRACTITIONER

## 2023-01-25 PROCEDURE — 36415 COLL VENOUS BLD VENIPUNCTURE: CPT | Performed by: NURSE PRACTITIONER

## 2023-01-25 PROCEDURE — 80061 LIPID PANEL: CPT | Performed by: NURSE PRACTITIONER

## 2023-01-25 PROCEDURE — 99999 PR PBB SHADOW E&M-EST. PATIENT-LVL III: CPT | Mod: PBBFAC,,,

## 2023-01-25 PROCEDURE — G0108 PR DIAB MANAGE TRN  PER INDIV: ICD-10-PCS | Mod: S$GLB,,,

## 2023-01-25 PROCEDURE — 95251 PR GLUCOSE MONITOR, 72 HOUR, PHYS INTERP: ICD-10-PCS | Mod: S$GLB,,, | Performed by: NURSE PRACTITIONER

## 2023-01-25 PROCEDURE — 99214 OFFICE O/P EST MOD 30 MIN: CPT | Mod: S$GLB,,, | Performed by: NURSE PRACTITIONER

## 2023-01-25 PROCEDURE — 83036 HEMOGLOBIN GLYCOSYLATED A1C: CPT | Performed by: NURSE PRACTITIONER

## 2023-01-25 PROCEDURE — 95251 CONT GLUC MNTR ANALYSIS I&R: CPT | Mod: S$GLB,,, | Performed by: NURSE PRACTITIONER

## 2023-01-25 PROCEDURE — 3077F PR MOST RECENT SYSTOLIC BLOOD PRESSURE >= 140 MM HG: ICD-10-PCS | Mod: CPTII,S$GLB,, | Performed by: NURSE PRACTITIONER

## 2023-01-25 PROCEDURE — 84443 ASSAY THYROID STIM HORMONE: CPT | Performed by: NURSE PRACTITIONER

## 2023-01-25 PROCEDURE — 1160F PR REVIEW ALL MEDS BY PRESCRIBER/CLIN PHARMACIST DOCUMENTED: ICD-10-PCS | Mod: CPTII,S$GLB,, | Performed by: NURSE PRACTITIONER

## 2023-01-25 PROCEDURE — G0108 DIAB MANAGE TRN  PER INDIV: HCPCS | Mod: S$GLB,,,

## 2023-01-25 PROCEDURE — 3008F BODY MASS INDEX DOCD: CPT | Mod: CPTII,S$GLB,, | Performed by: NURSE PRACTITIONER

## 2023-01-25 PROCEDURE — 3008F PR BODY MASS INDEX (BMI) DOCUMENTED: ICD-10-PCS | Mod: CPTII,S$GLB,, | Performed by: NURSE PRACTITIONER

## 2023-01-25 PROCEDURE — 3080F PR MOST RECENT DIASTOLIC BLOOD PRESSURE >= 90 MM HG: ICD-10-PCS | Mod: CPTII,S$GLB,, | Performed by: NURSE PRACTITIONER

## 2023-01-25 PROCEDURE — 1160F RVW MEDS BY RX/DR IN RCRD: CPT | Mod: CPTII,S$GLB,, | Performed by: NURSE PRACTITIONER

## 2023-01-25 PROCEDURE — 99214 PR OFFICE/OUTPT VISIT, EST, LEVL IV, 30-39 MIN: ICD-10-PCS | Mod: S$GLB,,, | Performed by: NURSE PRACTITIONER

## 2023-01-25 PROCEDURE — 99999 PR PBB SHADOW E&M-EST. PATIENT-LVL IV: CPT | Mod: PBBFAC,,, | Performed by: NURSE PRACTITIONER

## 2023-01-25 PROCEDURE — 99999 PR PBB SHADOW E&M-EST. PATIENT-LVL III: ICD-10-PCS | Mod: PBBFAC,,,

## 2023-01-25 PROCEDURE — 3080F DIAST BP >= 90 MM HG: CPT | Mod: CPTII,S$GLB,, | Performed by: NURSE PRACTITIONER

## 2023-01-25 PROCEDURE — 80053 COMPREHEN METABOLIC PANEL: CPT | Performed by: NURSE PRACTITIONER

## 2023-01-25 PROCEDURE — 1159F MED LIST DOCD IN RCRD: CPT | Mod: CPTII,S$GLB,, | Performed by: NURSE PRACTITIONER

## 2023-01-25 NOTE — LETTER
"Rob Marx (Harold) was seen in our clinic on 1/25/2023.  He may return to work on 1/26/2023.      If you have any questions or concerns, please don't hesitate to call.      EPI BritoN, RN  [facility]   [phone]      "

## 2023-01-25 NOTE — PATIENT INSTRUCTIONS
Labs today,fasting.    Pump back up plan:    In the event of pump failure/malfunction, remove pump and start Tresiba 50 units daily. Do not re-start your pod until speaking with our office first to determine when appropriate to re-start --- it will depend on last time you took Tresiba.    Using syringes, withdraw from Novolog to dose for all meals and snacks:    To determine how much to cover a meal/snack: Total up your carbs and divide by 10.   Example: Meal contains 46 grams of carbs.  46/10 = 4.6, round to 5 units to cover this meal.     To determine how much to correct a high blood sugar:    Take your current blood sugar - Target blood sugar (120), and divide by 45.  Example: Current blood sugar of 285.  285-120 = 165.  165 / 45 = 3.66. Round to 4 units to correct a high blood sugar of 285.    **If you are eating and your blood sugar is high, calculate for both and take the total 10-15 minutes before you eat**    **If you are just correcting a high blood sugar, do not correct more often than every 3-4 hours to prevent hypoglycemia**    Current pump settings:    Basal Rate  12A:1.4 units/hour  Max basal rate: 2 units/hour     Carb Ratio  12A:10   Max carb bolus:  15 units     ISF  12A:45   Reverse correction: OFF     Target: 120  Correct above: 120  Active insulin time: 3.5 hours

## 2023-01-25 NOTE — PROGRESS NOTES
Diabetes Care Specialist Progress Note  Author: Amy Will RN  Date: 1/25/2023    Program Intake  Reason for Diabetes Program Visit:: Intervention  Type of Intervention:: Individual  Individual: Education  Education: Self-Management Skill Review  Current diabetes risk level:: moderate  In the last 12 months, have you:: none  Permission to speak with others about care:: no    Lab Results   Component Value Date    HGBA1C 8.1 (H) 08/26/2022     OMNIPOD PUMP SETTINGS  ---------------------------------------  Basal/Bolus settings: (IC, IOB, & Target changed at appt today 1/25 with Brionna Emmanuel NP)     12a: 1.4 units/hr  // CF 45 //  IC 10   --------------------------------------  Target 120  Insulin duration  3.5 hrs   --------------------------------------     Insulin pump report in media  Reporting period: 1/12 - 1/25    Average Insulin Daily Dose: 38.9 units  Basal: 66% (25.6 units)  Bolus:  34% (13.3 units)    System Details:  Automated Mode: 97%  Automated: Limited: 38%  Automated: Activity: 0%  Manual Mode: 3%    Diet:  Carbs/Day: 105.4 grams    Blood Glucose Summary    Glucose Details  Average glucose: 204 mg/dL  -----------------------------  43% Time in range   33% High  24% Very High  0% Low  0% Very Low  -----------------------------  CGM in use: 55%    Clinical    Nutritional Status  Meal Plan 24 Hour Recall - Breakfast: None.  Meal Plan 24 Hour Recall - Lunch: None.  Meal Plan 24 Hour Recall - Dinner: Ribeye steak with mushrooms; Diet [employer] Products    Diabetes Self-Management Skills Assessment      Nutrition/Healthy Eating  Method of carbohydrate measurement:: carb counting/reading labels  Patient can identify foods that impact blood sugar.: yes  Patient-identified foods:: milk, soda, starches (bread, pasta, rice, cereal)  Nutrition/Healthy Eating Skills Assessment Completed:: Yes  Assessment indicates:: Knowledge deficit, Instruction Needed  Area of need?: Yes    Medications  Patient is able to describe  current diabetes management routine.: yes  Diabetes management routine:: insulin pump  Patient is able to identify current diabetes medications, dosages, and appropriate timing of medications.: no  Patient understands the purpose of the medications taken for diabetes.: no  Patient reports problems or concerns with current medication regimen.: yes  Medication regimen problems/concerns:: unsure of doses  Medication Skills Assessment Completed:: Yes  Assessment indicates:: Knowledge deficit, Instruction Needed  Area of need?: Yes    Home Blood Glucose Monitoring  Patient states that blood sugar is checked at home daily.: yes  Monitoring Method:: personal continuous glucose monitor  Personal CGM type:: Dexcom G6  Patient is able to use personal CGM appropriately.: yes  CGM Report reviewed?: yes  Home Blood Glucose Monitoring Skills Assessment Completed: : Yes  Assessment indicates:: Adequate understanding  Area of need?: No    Assessment Summary and Plan    Based on today's diabetes care assessment, the following areas of need were identified:      Social 9/12/2022   Support No   Access to Mass Media/Tech No   Cognitive/Behavioral Health No   Culture/Restoration No   Communication No   Health Literacy No        Clinical 9/12/2022   Medication Adherence No   Lab Compliance No   Nutritional Status No        Diabetes Self-Management Skills 1/25/2023   Nutrition/Healthy Eating Yes-See care plan for update.      Medication Yes-See care plan for update.      Home Blood Glucose Monitoring No            Today's interventions were provided through individual discussion, instruction, and written materials were provided.      Patient verbalized understanding of instruction and written materials.  Pt was able to return back demonstration of instructions today. Patient understood key points, needs reinforcement and further instruction.     Diabetes Self-Management Care Plan:    Today's Diabetes Self-Management Care Plan was developed  with Rob's input. Rob has agreed to work toward the following goal(s) to improve his/her overall diabetes control.      Care Plan: Diabetes Management   Updates made since 12/26/2022 12:00 AM        Problem: Medications         Goal: Patient agrees to take Diabetes Medication(s) as prescribed.    Start Date: 9/12/2022   Expected End Date: 3/15/2023   This Visit's Progress: On track   Recent Progress: On track   Priority: Medium   Barriers: Other (comments)   Note:    Pt to always take Novolog before meals  Currently Novolog is prescribed 10-20 units before meals, depending on carbs in meal.   Reviewed carb counting so pt can make better decisions on how much insulin to take.   Reviewed examples - the way pt eats pizza is actually fairly low in carbs.   For maureen, he was taking 10 units, but should actually take 20 units.     12/20/22:   OMNI POD 5 INSULIN PUMP START  Pump training was provided per Omni Pod protocol. Provider orders with insulin rates and training checklist saved media.     Details of pump therapy were covered included following: controller features and programming, pod activation, pod site selection and rotation, automatic pod priming and insertion, setting & editing basal rates in manual mode, giving bolus and other features in the set up menu.  Patient demonstrated ability to program controller, activate and insert pod using aseptic technique.  Patient demonstrated ability to program Dexcom transmitter into controller and start automated limited mode.    Instructed pt on use of basic pump features ie...give a bolus, pause insulin, switch from manual to automated mode.  Reviewed features available in manual mode verses automated mode.   Reviewed when and how to use activity function in automated mode.  Reviewed site selection of pods, rotation of sites and hard stop to change pod every 72 hrs.   Instructed that insulin vial is good out of refrigeration for up to 28-30 days .   Reviewed  treatment of hypoglycemia, hyperglycemia; sick day care, DKA, and troubleshooting of pump.  Omni Pod 24 hour support line provided.   Setup Omnipod and Glooko & linked to clinic  Reminded pt to set up WiFi when he gets home    INITIAL SETTINGS :  Basal rate: 12a-12a 1.6 units/hr  Maximum basal: 2 units/hr     Bolus Menu:  ISF: 1:45  Carb Ratio : 1:11 grams  Blood glucose target: 130 mg/dL; correct above: 130 mg/dL  Active insulin: 4 hrs  Maximum bolus: 15 units  Reverse Correction: Off    1/25/23: Reviewed Glooko report and BRENT Staton's note. Educated on bolusing 10-15 minutes before meals to prevent rapid spikes in blood sugar. Educated on extended bolus for high fat foods like [employer], fried wings, etc. Demonstrated how to use extend bolus by switching to automated mode, extended the bolus by 70/30, and remember to switch back to automated mode after the 2 hour extended bolus.        Problem: Healthy Eating         Goal: Eat 3 meals daily with 45-60g/3-4 servings of Carbohydrate per meal and low carb snacks.    Start Date: 9/12/2022   Expected End Date: 3/15/2023   This Visit's Progress: On track   Recent Progress: Not met   Priority: Low   Barriers: Other (comments)   Note:    Pt only eats a select few meals.   His diet is very high in saturated fat - suggested some changes that he can make.   Gave pt food record to keep track of his food intake and indicate how many grams of carb in each food. This will be necessary to be able to start pump. If he cannot accurately carb count, may need Omnipod DASH rather than Omnipod 5.     1/25/23: Patient is reading food labels and/or looking up nutrition facts on [employer]. He does not stay within the 45-60 gram range, but he does add his carbs up correctly. Has been drinking 4-8 beers per day and noticed spikes in blood sugar. Based on the information he provided, I educated him to enter 30 grams of carbs for when he drinks beer. I educated him to keep fast acting sugar with  him at all times in case of hypoglycemia.          Follow Up Plan     Follow up in about 10 weeks (around 4/5/2023).    Today's care plan and follow up schedule was discussed with patient.  Rob verbalized understanding of the care plan, goals, and agrees to follow up plan.        The patient was encouraged to communicate with his/her health care provider/physician and care team regarding his/her condition(s) and treatment.  I provided the patient with my contact information today and encouraged to contact me via phone or [facility] as needed.     Length of Visit   Total Time: 30 Minutes

## 2023-01-25 NOTE — PROGRESS NOTES
Subjective:         Patient ID: Rob Marx is a 52 y.o. male.  Patient's current PCP is Rudy Cochran MD.     Chief Complaint: Diabetes Mellitus    HPI  Rob Marx is a 52 y.o. White male presenting for a follow up for diabetes. Patient has been diagnosed with type 1 diabetes since age 19 .  Received diabetes education: Yes- [facility], 2022 (Appt with Tom Santos today, 10/17/2022)    CURRENT DM MEDICATIONS:   Novolog per Omni Pod 5 (started 12/20/2022) - automated mode  Pump Settings  Basal Rate  12A:1.4 units/hour  Max basal rate: 2 units/hour     Carb Ratio  12A:11   Max carb bolus:  15 units     ISF  12A:45   Reverse correction: OFF     Target: 130  Correct above: 130  Active insulin time: 4 hours      Diabetes Medications               glucagon (BAQSIMI) 3 mg/actuation Spry Use 1 spray in 1 nostril once as needed for a severe low blood sugar unable to be treated by mouth.    insulin degludec (TRESIBA FLEXTOUCH U-200) 200 unit/mL (3 mL) insulin pen Pump back up plan only: IN the event of pump failure/out of pods, re-start Tresiba 50 units daily. Do not re-start pump until speaking with provider.    NOVOLOG U-100 INSULIN ASPART 100 unit/mL injection Inject 200 units every 3 days per Omni Pod           Past failed treatment include: None    Blood glucose testing continuous per Dexcom  Preferred lab: [facility]    Any episodes of hypoglycemia? 0% per Dexcom    Complications related to diabetes: retinopathy    His blood sugar in the clinic today was:   Lab Results   Component Value Date    POCGLU 133 (A) 01/25/2023     Rob Marx presents today for follow up visit to discuss diabetes management. Between visits, he was trained on insulin pump therapy - ultimately,basal rate had to be reduced due to hypoglycemia. He was off his pump briefly due to continued issues with Dexcom (he went through 5 sensor samples- Dexcom was able to replace 2 of these-requesting samples today). He  is now back on his pump and reports doing well overall. Per pump/Dexcom download, for the last 14 days: TIR only 42%, otherwise high. Average glucose 205 mg/dL with no estimated GMI provided. Glucose range 96-HI. Consistent glucose rise associated with meals - finds it is taking a long time for blood sugar to reduce. He does not currently give a head start ahead of his meals -we discussed a 10-15 min head start. He does drink 1-2 beers/nightly when home from work and does not bolus for these- sometimes hitting > 300-400 following these beers. Some meals have higher fat- we discussed need to begin extended bolus for these meals. He will see DM education following this appt. Based on review, more insulin for carbs, lower Target from 130 to 120, and changed IOB from 4 to 3.5 hours today - settings adjusted at time of visit. Due for labs- will complete today as he is fasting.        Hypoglycemia unawareness-medically necessary to continue with Dexcom G6 and remain in automated mode.    Current diet: Eats about the same things every day. Lasagna,salami,hot dogs,ramen noodles.  Activity Level: Non-sedentary    Lab Results   Component Value Date    HGBA1C 8.1 (H) 08/26/2022    HGBA1C 8.4 (H) 03/18/2022    HGBA1C 8.1 (H) 07/13/2021     STANDARDS OF CARE  Diabetes Management Status    Statin: Taking  ACE/ARB: Taking    Screening or Prevention Patient's value Goal Complete/Controlled?   HgA1C Testing and Control   Lab Results   Component Value Date    HGBA1C 8.1 (H) 08/26/2022      Annually/Less than 8% No     Lipid profile : 03/18/2022 Annually Yes     LDL control Lab Results   Component Value Date    LDLCALC 144.4 03/18/2022    Annually/Less than 100 mg/dl  No     Nephropathy screening Lab Results   Component Value Date    LABMICR 67.0 08/26/2022     Lab Results   Component Value Date    PROTEINUA 1+ (A) 07/13/2021     No results found for: UTPCR   Annually Yes     Blood pressure BP Readings from Last 1 Encounters:    01/25/23 (!) 147/94    Less than 140/90 No     Dilated retinal exam : 08/03/2021 Annually No Message sent to assist with scheduling this-wants to be seen in [place]     Foot exam   : 01/25/2023 Annually Yes        Labs reviewed and are noted below.    Lab Results   Component Value Date    WBC 5.81 03/18/2022    HGB 17.4 03/18/2022    HCT 52.5 03/18/2022     03/18/2022    CHOL 230 (H) 03/18/2022    TRIG 178 (H) 03/18/2022    HDL 50 03/18/2022    LDLCALC 144.4 03/18/2022    ALT 24 03/18/2022    AST 28 03/18/2022     03/18/2022    K 4.1 03/18/2022     03/18/2022    ANIONGAP 15 03/18/2022    CREATININE 1.1 03/18/2022    ESTGFRAFRICA >60.0 03/18/2022    EGFRNONAA >60.0 03/18/2022    BUN 13 03/18/2022    CO2 24 03/18/2022    TSH 0.650 03/26/2019    PSA 0.53 03/18/2022     (H) 03/18/2022     Lab Results   Component Value Date    GLUTAMICACID 0.00 01/25/2019    CPEPTIDE <0.08 (L) 01/25/2019    TSH 0.650 03/26/2019    CALCIUM 10.2 03/18/2022     Lab Results   Component Value Date    CPEPTIDE <0.08 (L) 01/25/2019     Lab Results   Component Value Date    GLUTAMICACID 0.00 01/25/2019     Glucose   Date Value Ref Range Status   03/18/2022 236 (H) 70 - 110 mg/dL Final     Anion Gap   Date Value Ref Range Status   03/18/2022 15 8 - 16 mmol/L Final     eGFR if    Date Value Ref Range Status   03/18/2022 >60.0 >60 mL/min/1.73 m^2 Final     eGFR if non    Date Value Ref Range Status   03/18/2022 >60.0 >60 mL/min/1.73 m^2 Final     Comment:     Calculation used to obtain the estimated glomerular filtration  rate (eGFR) is the CKD-EPI equation.          The following portions of the patient's history were reviewed and updated as appropriate: allergies, current medications, past family history, past medical history, past social history, past surgical history, and problem list.    Review of patient's allergies indicates:   Allergen Reactions    Benadryl [diphenhydramine  hcl] Swelling     Social History     Socioeconomic History    Marital status:    Tobacco Use    Smoking status: Never    Smokeless tobacco: Current     Types: Snuff   Substance and Sexual Activity    Alcohol use: Yes    Drug use: No     Past Medical History:   Diagnosis Date    Diabetes mellitus type I     Hypertension      REVIEW OF SYSTEMS:  Eyes History of DR and overdue for dilated eye exam.  Cardiovascular: History of HTN.  GI: Positive diarrhea  : No CKD.  Neuro: No neuropathy.  PSYCH: No tobacco use.  ENDO: See HPI.        Objective:      Vitals:    01/25/23 0907   BP: (!) 147/94   Pulse: 76   Resp: 18     RESPIRATORY: No respiratory distress  NEUROLOGIC: Cranial nerves II-XII grossly intact.  PSYCHIATRIC: Alert & oriented x3. Normal mood and affect.  FOOT EXAMINATION: Protective Sensation (w/ 10 gram monofilament):  Right: Intact  Left: Intact    Visual Inspection:  Normal -  Bilateral and Nails Intact - without Evidence of Foot Deformity- Bilateral    Pedal Pulses:   Right: Present  Left: Present    Assessment:       1. Uncontrolled type 1 diabetes mellitus with hyperglycemia    2. Insulin pump in place    3. Hypoglycemia unawareness associated with type 1 diabetes mellitus    4. 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    Diagnoses and all orders for this visit:    Uncontrolled type 1 diabetes mellitus with hyperglycemia  -     POCT Glucose, Hand-Held Device  -     Hemoglobin A1C; Future  -     TSH; Future  -     Comprehensive Metabolic Panel; Future  -     Lipid Panel; Future    Chronic- Current settings: See below. Labs today.    Continuous glucose monitoring report:    The patient's CGM was downloaded and was reviewed for the last 14 days. See HPI for interpretation & plan.     Pump back up plan:    In the event of pump failure/malfunction, remove pump and start Tresiba 50 units daily.  "Do not re-start your pod until speaking with our office first to determine when appropriate to re-start --- it will depend on last time you took Tresiba.    Using syringes, withdraw from Novolog to dose for all meals and snacks:    To determine how much to cover a meal/snack: Total up your carbs and divide by 10.   Example: Meal contains 46 grams of carbs.  46/10 = 4.6, round to 5 units to cover this meal.     To determine how much to correct a high blood sugar:    Take your current blood sugar - Target blood sugar (120), and divide by 45.  Example: Current blood sugar of 285.  285-120 = 165.  165 / 45 = 3.66. Round to 4 units to correct a high blood sugar of 285.    **If you are eating and your blood sugar is high, calculate for both and take the total 10-15 minutes before you eat**    **If you are just correcting a high blood sugar, do not correct more often than every 3-4 hours to prevent hypoglycemia**    Insulin pump in place    Current pump settings:    Basal Rate  12A:1.4 units/hour  Max basal rate: 2 units/hour     Carb Ratio  12A:10   Max carb bolus:  15 units     ISF  12A:45   Reverse correction: OFF     Target: 120  Correct above: 120  Active insulin time: 3.5 hours    Hypoglycemia unawareness associated with type 1 diabetes mellitus    Chronic-Medically necessary to continue with Dexcom G6.    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 Message sent to assist with scheduling.     - Follow up: 6 weeks    Portions of this note may have been created with voice recognition software. Occasional "wrong-word" or "sound-a-like" substitutions may have occurred due to the inherent limitations of voice recognition software. Please, read the note carefully and recognize, using context, where substitutions have occurred.           Sheri Ammons,FNP-C [facility]       "

## 2023-01-26 NOTE — PROGRESS NOTES
Normal kidney and liver function. A1c is slightly improved over previous at 7.9%. Thyroid level is normal and great improvement in cholesterol panel- all aspects now at goal.

## 2023-02-01 ENCOUNTER — TELEPHONE (OUTPATIENT)
Dept: DIABETES | Facility: CLINIC | Age: 53
End: 2023-02-01
Payer: COMMERCIAL

## 2023-02-01 NOTE — TELEPHONE ENCOUNTER
Attempted to reach out since making changes to OP5 doses. No answer and VM not set up. Will send message via portal.

## 2023-02-24 ENCOUNTER — PATIENT OUTREACH (OUTPATIENT)
Dept: ADMINISTRATIVE | Facility: HOSPITAL | Age: 53
End: 2023-02-24
Payer: COMMERCIAL

## 2023-02-24 NOTE — PROGRESS NOTES
DM Eye Report: Per chart review pt overdue for DM eye exam, attempted to contact pt, no ans, phone not accepting messages.

## 2023-03-20 ENCOUNTER — PATIENT MESSAGE (OUTPATIENT)
Dept: DIABETES | Facility: CLINIC | Age: 53
End: 2023-03-20
Payer: COMMERCIAL

## 2023-03-20 ENCOUNTER — OFFICE VISIT (OUTPATIENT)
Dept: DIABETES | Facility: CLINIC | Age: 53
End: 2023-03-20
Payer: COMMERCIAL

## 2023-03-20 DIAGNOSIS — E10.649 HYPOGLYCEMIA UNAWARENESS ASSOCIATED WITH TYPE 1 DIABETES MELLITUS: ICD-10-CM

## 2023-03-20 DIAGNOSIS — E10.65 UNCONTROLLED TYPE 1 DIABETES MELLITUS WITH HYPERGLYCEMIA: Primary | ICD-10-CM

## 2023-03-20 DIAGNOSIS — Z96.41 INSULIN PUMP IN PLACE: ICD-10-CM

## 2023-03-20 PROCEDURE — 1160F RVW MEDS BY RX/DR IN RCRD: CPT | Mod: CPTII,95,, | Performed by: NURSE PRACTITIONER

## 2023-03-20 PROCEDURE — 1160F PR REVIEW ALL MEDS BY PRESCRIBER/CLIN PHARMACIST DOCUMENTED: ICD-10-PCS | Mod: CPTII,95,, | Performed by: NURSE PRACTITIONER

## 2023-03-20 PROCEDURE — 3051F HG A1C>EQUAL 7.0%<8.0%: CPT | Mod: CPTII,95,, | Performed by: NURSE PRACTITIONER

## 2023-03-20 PROCEDURE — 1159F MED LIST DOCD IN RCRD: CPT | Mod: CPTII,95,, | Performed by: NURSE PRACTITIONER

## 2023-03-20 PROCEDURE — 99212 OFFICE O/P EST SF 10 MIN: CPT | Mod: 95,,, | Performed by: NURSE PRACTITIONER

## 2023-03-20 PROCEDURE — 3051F PR MOST RECENT HEMOGLOBIN A1C LEVEL 7.0 - < 8.0%: ICD-10-PCS | Mod: CPTII,95,, | Performed by: NURSE PRACTITIONER

## 2023-03-20 PROCEDURE — 1159F PR MEDICATION LIST DOCUMENTED IN MEDICAL RECORD: ICD-10-PCS | Mod: CPTII,95,, | Performed by: NURSE PRACTITIONER

## 2023-03-20 PROCEDURE — 99212 PR OFFICE/OUTPT VISIT, EST, LEVL II, 10-19 MIN: ICD-10-PCS | Mod: 95,,, | Performed by: NURSE PRACTITIONER

## 2023-03-20 NOTE — PROGRESS NOTES
Established Patient - Audio Only Telehealth Visit     The patient location is: [place]  The chief complaint leading to consultation is: diabetes management follow up   Visit type: Virtual visit with audio only (telephone)  Total time spent with patient: 12 minutes       The reason for the audio only service rather than synchronous audio and video virtual visit was related to technical difficulties or patient preference/necessity.     Each patient to whom I provide medical services by telemedicine is:  (1) informed of the relationship between the physician and patient and the respective role of any other health care provider with respect to management of the patient; and (2) notified that they may decline to receive medical services by telemedicine and may withdraw from such care at any time. Patient verbally consented to receive this service via voice-only telephone call.       HPI: Requested an audio visit to discuss issues with cost on current regimen.  He reports doing well with the pump overall - states his Bgs are improved, and he is now feeling s/s of hypoglycemia in the 80s-90s (previously, was not symptomatic until the 40s-50s). Getting Dexcom currently through [employer] - He has not picked up his first supply yet- he is concerned as the cost of insulin and pods are expensive for him. Encouraged patient to call and see how much his Dexcom supplies will be on his insurance.     Diabetes Medications               glucagon (BAQSIMI) 3 mg/actuation Spry Use 1 spray in 1 nostril once as needed for a severe low blood sugar unable to be treated by mouth.    insulin degludec (TRESIBA FLEXTOUCH U-200) 200 unit/mL (3 mL) insulin pen Pump back up plan only: IN the event of pump failure/out of pods, re-start Tresiba 50 units daily. Do not re-start pump until speaking with provider.    NOVOLOG U-100 INSULIN ASPART 100 unit/mL injection Inject 200 units every 3 days per Omni Pod           Diabetes Management  Status    Statin: Taking  ACE/ARB: Taking    Screening or Prevention Patient's value Goal Complete/Controlled?   HgA1C Testing and Control   Lab Results   Component Value Date    HGBA1C 7.9 (H) 01/25/2023      Annually/Less than 8% Yes     Lipid profile : 01/25/2023 Annually Yes     LDL control Lab Results   Component Value Date    LDLCALC 70.6 01/25/2023    Annually/Less than 100 mg/dl  Yes     Nephropathy screening Lab Results   Component Value Date    LABMICR 67.0 08/26/2022     Lab Results   Component Value Date    PROTEINUA 1+ (A) 07/13/2021     No results found for: UTPCR   Annually Yes     Blood pressure BP Readings from Last 1 Encounters:   01/25/23 (!) 147/94    Less than 140/90 No     Dilated retinal exam : 08/03/2021 Annually No     Foot exam   : 01/25/2023 Annually Yes           Assessment and plan:      Diagnoses and all orders for this visit:    Uncontrolled type 1 diabetes mellitus with hyperglycemia [E10.65 (ICD-10-CM)]    Chronic- current settings: see below-    Insulin pump in place    Basal/Bolus settings:      12a: 1.4 units/hr  // CF 45 //  IC 10   --------------------------------------  Target 120  Insulin duration  3.5 hrs   --------------------------------------     Hypoglycemia unawareness associated with type 1 diabetes mellitus    Chronic,Improving                            This service was not originating from a related E/M service provided within the previous 7 days nor will  to an E/M service or procedure within the next 24 hours or my soonest available appointment.  Prevailing standard of care was able to be met in this audio-only visit.

## 2023-03-20 NOTE — TELEPHONE ENCOUNTER
Returned pt call. He says he is having some issue she would like to discuss with Brionna. I was able to schedule him today ar 2:30. Pt is checked in.

## 2023-04-04 ENCOUNTER — TELEPHONE (OUTPATIENT)
Dept: DIABETES | Facility: CLINIC | Age: 53
End: 2023-04-04
Payer: COMMERCIAL

## 2023-04-05 NOTE — TELEPHONE ENCOUNTER
----- Message from Brionna Emmanuel NP sent at 4/4/2023  4:20 PM CDT -----  Patient was still at work and unable to complete his visit when I called. He is asking for an audio call on April 24- please assist with scheduling this. He is not able to do virtual visits. Notes:Omni Pod 5.

## 2023-04-19 ENCOUNTER — PATIENT MESSAGE (OUTPATIENT)
Dept: ADMINISTRATIVE | Facility: HOSPITAL | Age: 53
End: 2023-04-19
Payer: COMMERCIAL

## 2023-04-21 ENCOUNTER — CLINICAL SUPPORT (OUTPATIENT)
Dept: DIABETES | Facility: CLINIC | Age: 53
End: 2023-04-21
Payer: COMMERCIAL

## 2023-04-21 ENCOUNTER — TELEPHONE (OUTPATIENT)
Dept: PHARMACY | Facility: CLINIC | Age: 53
End: 2023-04-21
Payer: COMMERCIAL

## 2023-04-21 VITALS — HEIGHT: 71 IN | WEIGHT: 175.5 LBS | BODY MASS INDEX: 24.57 KG/M2

## 2023-04-21 DIAGNOSIS — E10.65 UNCONTROLLED TYPE 1 DIABETES MELLITUS WITH HYPERGLYCEMIA: Primary | ICD-10-CM

## 2023-04-21 DIAGNOSIS — E10.649 HYPOGLYCEMIA UNAWARENESS ASSOCIATED WITH TYPE 1 DIABETES MELLITUS: ICD-10-CM

## 2023-04-21 PROCEDURE — G0108 DIAB MANAGE TRN  PER INDIV: HCPCS | Mod: S$GLB,,, | Performed by: DIETITIAN, REGISTERED

## 2023-04-21 PROCEDURE — 99999 PR PBB SHADOW E&M-EST. PATIENT-LVL II: CPT | Mod: PBBFAC,,, | Performed by: DIETITIAN, REGISTERED

## 2023-04-21 PROCEDURE — 99999 PR PBB SHADOW E&M-EST. PATIENT-LVL II: ICD-10-PCS | Mod: PBBFAC,,, | Performed by: DIETITIAN, REGISTERED

## 2023-04-21 PROCEDURE — G0108 PR DIAB MANAGE TRN  PER INDIV: ICD-10-PCS | Mod: S$GLB,,, | Performed by: DIETITIAN, REGISTERED

## 2023-04-21 NOTE — TELEPHONE ENCOUNTER
Spoke with patient about referral for insulins, dexcom and Omnipod.  The insulins has a coupon applied to it for him.  The dexcom he need to provide proof of income so that I complete the registration online and the Omnipod the patient has to call the company himself which I provided the telephone number to him.

## 2023-04-21 NOTE — LETTER
April 21, 2023    Rob Marx  [address] [place] [address]             [facility] Diabetes Education  Diabetes  [address] [place] [address]  Phone: [phone]  Fax: [phone]   April 21, 2023     Patient: Rob Marx   YOB: 1970   Date of Visit: 4/21/2023       To Whom it May Concern:    Rob Marx was seen in my clinic on 4/21/2023.    Please excuse him from any work missed.    If you have any questions or concerns, please don't hesitate to call.    Sincerely,         Tom Santos RD

## 2023-04-21 NOTE — PROGRESS NOTES
"Diabetes Care Specialist Progress Note  Author: Tom Santso, CHRIS  Date: 4/21/2023    Program Intake  Reason for Diabetes Program Visit:: Intervention  Type of Intervention:: Individual  Individual: Education  Education: Self-Management Skill Review    Lab Results   Component Value Date    HGBA1C 7.9 (H) 01/25/2023       Clinical    Weight: 79.6 kg (175 lb 7.8 oz)   Height: 5' 11" (180.3 cm)   Body mass index is 24.48 kg/m².  Wt gain of 2 lbs since last visit on 1/25/23 by Amy Will RN for diabetes education    PUMP SETTINGS  -------------------------------------------------------------------------  Basal rates (used for manual mode only - in media)   -------------------------------------------------------------------------  Bolus settings:   I:C  10  ISF:  45  Target:   12a-12a: 120    Insulin duration - 3.5 hrs   Reverse correction:  off  -------------------------------------------------------------------------   Of note:    100% in automated limited mode since pt does not have a Dexcom sensor on.   When sensor was on, transmitter number was not correct in the Omnipod controller, so he could not be in automated mode.           Clarity report summary below and in media            Today's interventions were provided through individual discussion, instruction, and written materials were provided.      Patient verbalized understanding of instruction and written materials.  Pt was able to return back demonstration of instructions today. Patient understood key points, needs reinforcement and further instruction.     Diabetes Self-Management Care Plan:    Today's Diabetes Self-Management Care Plan was developed with Rob's input. Rob has agreed to work toward the following goal(s) to improve his/her overall diabetes control.      Care Plan: Diabetes Management   Updates made since 3/22/2023 12:00 AM        Problem: Medications         Goal: Patient agrees to take Diabetes Medication(s) as prescribed.  "   Start Date: 9/12/2022   Expected End Date: 3/15/2023   This Visit's Progress: Not met   Recent Progress: On track   Priority: Medium   Barriers: Other (comments)   Note:    1/25/23: Reviewed Yasmani report and BRENT Staton's note. Educated on bolusing 10-15 minutes before meals to prevent rapid spikes in blood sugar. Educated on extended bolus for high fat foods like [employer], fried wings, etc. Demonstrated how to use extend bolus by switching to automated mode, extended the bolus by 70/30, and remember to switch back to automated mode after the 2 hour extended bolus.     4-21-23:    Pt was getting messages every hour for no sensor readings. Showed pt how to switch modes. If he is off of his sensor, he is to switch to manual mode, so alerts don't cont.   He also noted that when he takes several boluses (up to 10 in 1 day), he gets a notification that he has exceeded TDD. Perhaps putting back in manual mode would prevent this alert.   Pt's wife was concerned about hypoglycemia due to drinking more beer than he is eating.   Explained that his liver cannot correct hypoglycemia when he is drinking alcohol. He is to eat when drinking. However, pt does not like to eat when he is drinking.     Pt is picking up a Dexcom sensor today so he will be able to switch back to automated mode.   He is considering switching back to using insulin pens due to cost of pods and insulin in addition to CGM       Problem: Blood Glucose Self-Monitoring         Goal: Patient agrees to check and record fasting BG and before each meal.    Start Date: 9/12/2022   Expected End Date: 3/15/2023   This Visit's Progress: Not met   Recent Progress: On track   Priority: High   Barriers: Other (comments)   Note:    Pt is using Dexcom G6 most of the time.   He recently ran out of sensors and said he does not have the money to get more.   Gave 1 sensor today, but explained that he has to decide what he want to do for the long term.   He agreed that having  the CGM is a big help.   Encouraged to continue getting supplies.            Follow Up Plan     Follow up in 17 days (on 5/8/2023).    Today's care plan and follow up schedule was discussed with patient.  Rob verbalized understanding of the care plan, goals, and agrees to follow up plan.        The patient was encouraged to communicate with his/her health care provider/physician and care team regarding his/her condition(s) and treatment.  I provided the patient with my contact information today and encouraged to contact me via phone or [facility] Patient Portal as needed.     Length of Visit   Total Time: 60 Minutes

## 2023-04-24 ENCOUNTER — OFFICE VISIT (OUTPATIENT)
Dept: DIABETES | Facility: CLINIC | Age: 53
End: 2023-04-24
Payer: COMMERCIAL

## 2023-04-24 DIAGNOSIS — Z96.41 INSULIN PUMP IN PLACE: ICD-10-CM

## 2023-04-24 DIAGNOSIS — E10.649 HYPOGLYCEMIA UNAWARENESS ASSOCIATED WITH TYPE 1 DIABETES MELLITUS: ICD-10-CM

## 2023-04-24 DIAGNOSIS — E10.65 UNCONTROLLED TYPE 1 DIABETES MELLITUS WITH HYPERGLYCEMIA: Primary | ICD-10-CM

## 2023-04-24 PROCEDURE — 3051F PR MOST RECENT HEMOGLOBIN A1C LEVEL 7.0 - < 8.0%: ICD-10-PCS | Mod: CPTII,95,, | Performed by: NURSE PRACTITIONER

## 2023-04-24 PROCEDURE — 99212 PR OFFICE/OUTPT VISIT, EST, LEVL II, 10-19 MIN: ICD-10-PCS | Mod: 95,,, | Performed by: NURSE PRACTITIONER

## 2023-04-24 PROCEDURE — 4010F PR ACE/ARB THEARPY RXD/TAKEN: ICD-10-PCS | Mod: CPTII,95,, | Performed by: NURSE PRACTITIONER

## 2023-04-24 PROCEDURE — 99212 OFFICE O/P EST SF 10 MIN: CPT | Mod: 95,,, | Performed by: NURSE PRACTITIONER

## 2023-04-24 PROCEDURE — 4010F ACE/ARB THERAPY RXD/TAKEN: CPT | Mod: CPTII,95,, | Performed by: NURSE PRACTITIONER

## 2023-04-24 PROCEDURE — 3051F HG A1C>EQUAL 7.0%<8.0%: CPT | Mod: CPTII,95,, | Performed by: NURSE PRACTITIONER

## 2023-04-24 PROCEDURE — 1160F PR REVIEW ALL MEDS BY PRESCRIBER/CLIN PHARMACIST DOCUMENTED: ICD-10-PCS | Mod: CPTII,95,, | Performed by: NURSE PRACTITIONER

## 2023-04-24 PROCEDURE — 1159F MED LIST DOCD IN RCRD: CPT | Mod: CPTII,95,, | Performed by: NURSE PRACTITIONER

## 2023-04-24 PROCEDURE — 1160F RVW MEDS BY RX/DR IN RCRD: CPT | Mod: CPTII,95,, | Performed by: NURSE PRACTITIONER

## 2023-04-24 PROCEDURE — 1159F PR MEDICATION LIST DOCUMENTED IN MEDICAL RECORD: ICD-10-PCS | Mod: CPTII,95,, | Performed by: NURSE PRACTITIONER

## 2023-04-24 NOTE — PROGRESS NOTES
Established Patient - Audio Only Telehealth Visit     The patient location is: [place]  The chief complaint leading to consultation is: diabetes management follow up   Visit type: Virtual visit with audio only (telephone)  Total time spent with patient: 15 minutes       The reason for the audio only service rather than synchronous audio and video virtual visit was related to technical difficulties or patient preference/necessity.     Each patient to whom I provide medical services by telemedicine is:  (1) informed of the relationship between the physician and patient and the respective role of any other health care provider with respect to management of the patient; and (2) notified that they may decline to receive medical services by telemedicine and may withdraw from such care at any time. Patient verbally consented to receive this service via voice-only telephone call.       HPI: Insulin currently affordable but having issues paying for Dexcom and Omni Pod. He was given a sensor sample by Tom Santos at last visit with her 4/21 and states he is back in automated mode.I do not have any data today now that he is back in automated mode. He has a follow up with Tom Santos on 5/8 and will wait until that visit to assess if further changes are needed. He is working with [employer] with patient assisance for help with cost of Dexcom and Omni Pod. He has tried to fax paperwork/proof of income but states the fax number provided was not working. Spoke with [employer] who confirms that is the correct fax, however she gave the OK for patient to send his records to her so she can fax for him. Patient verbalized understanding.    Basal/Bolus settings:      12a:          1.4 units/hr  // CF 45 //  IC 10   --------------------------------------  Target 120  Insulin duration  3.5 hrs   --------------------------------------     Diabetes Medications               glucagon (BAQSIMI) 3 mg/actuation Spry Use 1 spray in 1  nostril once as needed for a severe low blood sugar unable to be treated by mouth.    insulin degludec (TRESIBA FLEXTOUCH U-200) 200 unit/mL (3 mL) insulin pen Pump back up plan only: IN the event of pump failure/out of pods, re-start Tresiba 50 units daily. Do not re-start pump until speaking with provider.    NOVOLOG U-100 INSULIN ASPART 100 unit/mL injection Inject 200 units every 3 days per Omni Pod           Diabetes Management Status    Statin: Taking  ACE/ARB: Taking    Screening or Prevention Patient's value Goal Complete/Controlled?   HgA1C Testing and Control   Lab Results   Component Value Date    HGBA1C 7.9 (H) 01/25/2023      Annually/Less than 8% Yes     Lipid profile : 01/25/2023 Annually Yes     LDL control Lab Results   Component Value Date    LDLCALC 70.6 01/25/2023    Annually/Less than 100 mg/dl  Yes     Nephropathy screening Lab Results   Component Value Date    LABMICR 67.0 08/26/2022     Lab Results   Component Value Date    PROTEINUA 1+ (A) 07/13/2021     No results found for: UTPCR   Annually Yes     Blood pressure BP Readings from Last 1 Encounters:   01/25/23 (!) 147/94    Less than 140/90 No     Dilated retinal exam : 08/03/2021 Annually No     Foot exam   : 01/25/2023 Annually Yes           Assessment and plan:      Rob was seen today for diabetes mellitus.    Diagnoses and all orders for this visit:    Uncontrolled type 1 diabetes mellitus with hyperglycemia    Chronic- Continue current settings. Unfortunately I do not have any data now that patient is back in automated mode. Needs to complete patient assistance paperwork for Dexcom/Omni Pod- working with patient assistance department.    Follow up with Tom Santos on 5/8 so we can assess if changes are necessary now that in automated mode.    Hypoglycemia unawareness associated with type 1 diabetes mellitus    Insulin pump in place                              This service was not originating from a related E/M service provided  within the previous 7 days nor will  to an E/M service or procedure within the next 24 hours or my soonest available appointment.  Prevailing standard of care was able to be met in this audio-only visit.

## 2023-04-26 ENCOUNTER — PATIENT MESSAGE (OUTPATIENT)
Dept: PRIMARY CARE CLINIC | Facility: CLINIC | Age: 53
End: 2023-04-26
Payer: COMMERCIAL

## 2023-04-27 NOTE — PATIENT INSTRUCTIONS
Pump back up plan:    In the event of pump failure/malfunction, remove pump and start Tresiba 50 units daily. Do not re-start your pod until speaking with our office first to determine when appropriate to re-start --- it will depend on last time you took Tresiba.    Using syringes, withdraw from Novolog to dose for all meals and snacks:    To determine how much to cover a meal/snack: Total up your carbs and divide by 10.   Example: Meal contains 46 grams of carbs.  46/10 = 4.6, round to 5 units to cover this meal.     To determine how much to correct a high blood sugar:    Take your current blood sugar - Target blood sugar (120), and divide by 45.  Example: Current blood sugar of 285.  285-120 = 165.  165 / 45 = 3.66. Round to 4 units to correct a high blood sugar of 285.    **If you are eating and your blood sugar is high, calculate for both and take the total 10-15 minutes before you eat**    **If you are just correcting a high blood sugar, do not correct more often than every 3-4 hours to prevent hypoglycemia**    Current pump settings:    Basal Rate  12A:1.4 units/hour  Max basal rate: 2 units/hour     Carb Ratio  12A:10   Max carb bolus:  15 units     ISF  12A:45   Reverse correction: OFF     Target: 120  Correct above: 120  Active insulin time: 3.5 hours

## 2023-05-10 ENCOUNTER — TELEPHONE (OUTPATIENT)
Dept: DIABETES | Facility: CLINIC | Age: 53
End: 2023-05-10
Payer: COMMERCIAL

## 2023-05-10 DIAGNOSIS — E10.65 UNCONTROLLED TYPE 1 DIABETES MELLITUS WITH HYPERGLYCEMIA: ICD-10-CM

## 2023-05-10 DIAGNOSIS — E10.8 TYPE 1 DIABETES MELLITUS WITH COMPLICATION: ICD-10-CM

## 2023-05-10 RX ORDER — INSULIN PUMP SYRINGE, 3 ML
EACH MISCELLANEOUS
Qty: 1 EACH | Refills: 0 | Status: SHIPPED | OUTPATIENT
Start: 2023-05-10

## 2023-05-10 RX ORDER — INSULIN DEGLUDEC 200 U/ML
40 INJECTION, SOLUTION SUBCUTANEOUS DAILY
Qty: 15 PEN | Refills: 5 | Status: SHIPPED | OUTPATIENT
Start: 2023-05-10

## 2023-05-10 RX ORDER — PEN NEEDLE, DIABETIC 30 GX3/16"
NEEDLE, DISPOSABLE MISCELLANEOUS
Qty: 200 EACH | Refills: 5 | Status: SHIPPED | OUTPATIENT
Start: 2023-05-10

## 2023-05-10 RX ORDER — LANCETS
EACH MISCELLANEOUS
Qty: 200 EACH | Refills: 11 | Status: SHIPPED | OUTPATIENT
Start: 2023-05-10

## 2023-05-10 RX ORDER — INSULIN ASPART 100 [IU]/ML
INJECTION, SOLUTION INTRAVENOUS; SUBCUTANEOUS
Qty: 15 ML | Refills: 5 | Status: SHIPPED | OUTPATIENT
Start: 2023-05-10

## 2023-05-10 NOTE — TELEPHONE ENCOUNTER
----- Message from Pop Chand sent at 5/8/2023 10:41 AM CDT -----  Regarding: Appt  Contact: [phone]  Patient is calling to reschedule appointment due to he thought appointment was going to be virtual not in office an he is out of town. Please contact pt

## 2023-05-10 NOTE — TELEPHONE ENCOUNTER
Returned call to pt and rescheduled appt to 5/19/23    Pt also stated that he wants to go back to using insulin pens and using glucometer to check BG. Using Dexcom and Omipod is too expensive. Cost per month increased from $55 to over $400.   He needs a new glucometer with supplies and needs Rx for Tresiba and Novolog pens as well as pen needles.     Asked pt to keep a BG log and bring to his next appointment.

## 2023-05-10 NOTE — TELEPHONE ENCOUNTER
I'm so disappointed as I know it's helped him so much, but I do understand. I will send in the back up supplies for him.    Tom- do you think he would at least consider InPen?    Once his supplies run out, he is to start Tresiba 40 units once daily. This will cover his long-acting needs and we will adjust based on the fasting sugar.      I will change Novolog vials to pens.     I still want him to dose for his carbs. He needs to total up his carbs and divide by 10 for all meals and snacks.     He should check his sugar before every meal. If his pre-meal blood sugar is high, I want him to calculate how much to give per correction scale:     If he would prefer to do the math, I could teach him how to do that, but here is the scale I want him to use. Only use correction scale before each main meal, not before snacks. If the correction scale is used more often than every 4 hours, it could lead to a low blood sugar. He should however always dose for carbs in his snacks on their own.     : Subtract 1 unit  101-164: No additional insulin is needed, just dose for your carbs.  165-210: +1 unit  211-255: +2 units  256-300: +3 units  301-345: +4 units  346-390: +5 units  >390: +6 units

## 2023-05-19 ENCOUNTER — CLINICAL SUPPORT (OUTPATIENT)
Dept: DIABETES | Facility: CLINIC | Age: 53
End: 2023-05-19
Payer: COMMERCIAL

## 2023-05-19 VITALS — HEIGHT: 71 IN | WEIGHT: 173.5 LBS | BODY MASS INDEX: 24.29 KG/M2

## 2023-05-19 DIAGNOSIS — E10.65 UNCONTROLLED TYPE 1 DIABETES MELLITUS WITH HYPERGLYCEMIA: Primary | ICD-10-CM

## 2023-05-19 PROCEDURE — 99999 PR PBB SHADOW E&M-EST. PATIENT-LVL II: ICD-10-PCS | Mod: PBBFAC,,, | Performed by: DIETITIAN, REGISTERED

## 2023-05-19 PROCEDURE — 99999 PR PBB SHADOW E&M-EST. PATIENT-LVL II: CPT | Mod: PBBFAC,,, | Performed by: DIETITIAN, REGISTERED

## 2023-05-19 PROCEDURE — G0108 DIAB MANAGE TRN  PER INDIV: HCPCS | Mod: S$GLB,,, | Performed by: DIETITIAN, REGISTERED

## 2023-05-19 PROCEDURE — G0108 PR DIAB MANAGE TRN  PER INDIV: ICD-10-PCS | Mod: S$GLB,,, | Performed by: DIETITIAN, REGISTERED

## 2023-05-19 NOTE — LETTER
May 19, 2023    Rob Marx  [id number] [place] [address]             [facility] Diabetes Education  Diabetes  [address] [place] [address]  Phone: [phone]  Fax: [phone]   May 19, 2023     Patient: Rob Marx   YOB: 1970   Date of Visit: 5/19/2023       To Whom it May Concern:    Rob Marx was seen in my clinic on 5/19/2023.    Please excuse him from any classes or work missed.    If you have any questions or concerns, please don't hesitate to call.    Sincerely,         Tom Santos RD

## 2023-05-19 NOTE — PROGRESS NOTES
"Diabetes Care Specialist Progress Note  Author: Tom Santos, CHRIS  Date: 5/23/2023    Program Intake  Reason for Diabetes Program Visit:: Intervention  Type of Intervention:: Individual  Individual: Education  Education: Self-Management Skill Review, Nutrition and Meal Planning    Lab Results   Component Value Date    HGBA1C 7.9 (H) 01/25/2023     Clinical    Weight: 78.7 kg (173 lb 8 oz)   Height: 5' 11" (180.3 cm)   Body mass index is 24.2 kg/m².  Wt loss of 2 lbs since last visit on 4/21/2023    CURRENT DM MEDICATIONS:   Novolog via Omnipod 5      Nutritional Status  Meal Plan 24 Hour Recall: Breakfast, Lunch, Dinner, Snack  Meal Plan 24 Hour Recall - Breakfast: None  Meal Plan 24 Hour Recall - Lunch: 2 hamburger patties; Diet Coke  Meal Plan 24 Hour Recall - Dinner: lasagna (86 grams carb), 16 oz milk  Meal Plan 24 Hour Recall - Snack: none      PUMP SETTINGS  -------------------------------------------------------------------------  Basal rates (used for manual mode only - in media)   -------------------------------------------------------------------------  Bolus settings:   I:C  10  ISF:  45  Target:   12a-12a: 120, correct above 120    Insulin duration - 3.5 hrs   Reverse correction:  off  -------------------------------------------------------------------------       Insulin pump report summary below and in media          Of Note:   No BG readings          Today's interventions were provided through individual discussion, instruction, and written materials were provided.      Patient verbalized understanding of instruction and written materials.  Pt was able to return back demonstration of instructions today. Patient understood key points, needs reinforcement and further instruction.     Diabetes Self-Management Care Plan:    Today's Diabetes Self-Management Care Plan was developed with Rob's input. Rob has agreed to work toward the following goal(s) to improve his/her overall diabetes control.  "     Care Plan: Diabetes Management   Updates made since 4/23/2023 12:00 AM        Problem: Medications         Goal: Patient agrees to take Diabetes Medication(s) as prescribed.    Start Date: 9/12/2022   Expected End Date: 3/15/2023   This Visit's Progress: No change   Recent Progress: Not met   Priority: Medium   Barriers: Other (comments)   Note:    5/19/23  Pt is currently using the rest of his pods, but once he runs out, he will switch back to MDI due to cost of pump supplies.     He will start on Tresiba, 40 units - taken at the same time each day  He will calculate Novolog dose using IC of 10 plus SS for correction  : Subtract 1 unit  101-164: No additional insulin is needed, just dose for your carbs.  165-210: +1 unit  211-255: +2 units  256-300: +3 units  301-345: +4 units  346-390: +5 units  >390: +6 units    Reviewed calculations   Pt was able to count his carbs in meals then calculate insulin needs.     Pt was also concerned about his Tresiba dose. He was previously on 60 units daily. Reviewed pump report and explained TDD and how insulin needs are determined. Pt had hypoglycemia when on higher dose of Tresiba.        Problem: Healthy Eating         Goal: Eat 3 meals daily with 45-60g/3-4 servings of Carbohydrate per meal and low carb snacks.    Start Date: 9/12/2022   Expected End Date: 3/15/2023   This Visit's Progress: No change   Recent Progress: On track   Priority: Low   Barriers: Other (comments)   Note:    1/25/23: Patient is reading food labels and/or looking up nutrition facts on [employer]. He does not stay within the 45-60 gram range, but he does add his carbs up correctly. Has been drinking 4-8 beers per day and noticed spikes in blood sugar. Based on the information he provided, I educated him to enter 30 grams of carbs for when he drinks beer. I educated him to keep fast acting sugar with him at all times in case of hypoglycemia.     5/19/23  Pt is eating  grams of carb per meal.    He will continue to count carbs in order to dose his insulin since he is switching back to pens..          Problem: Blood Glucose Self-Monitoring         Goal: Patient agrees to check and record fasting BG and before each meal.    Start Date: 9/12/2022   Expected End Date: 3/15/2023   This Visit's Progress: Not met   Recent Progress: Not met   Priority: High   Barriers: Other (comments)   Note:    5/19/23  Pt is no longer using Dexcom due to cost  He started checking BG with glucometer 2 days ago  He will cont to check before every meal to determine need for insulin correction dose         Follow Up Plan     Follow up in about 4 weeks (around 6/16/2023) for Inpen training.  Brionna Emmanuel ordered Inpen for pt. He will finish his current supply of Novolog then switch to the Inpen..     Today's care plan and follow up schedule was discussed with patient.  Rob verbalized understanding of the care plan, goals, and agrees to follow up plan.        The patient was encouraged to communicate with his/her health care provider/physician and care team regarding his/her condition(s) and treatment.  I provided the patient with my contact information today and encouraged to contact me via phone or [facility]'s Patient Portal as needed.     Length of Visit   Total Time: 60 Minutes

## 2023-05-19 NOTE — PATIENT INSTRUCTIONS
Tresiba, 40 units at the same time each day    Novolog:  Add total carbs and divide by 10   This is your dose for carbs    Then add or subtract the following from that number.   : Subtract 1 unit  101-164: No additional insulin is needed, just dose for your carbs.  165-210: +1 unit  211-255: +2 units  256-300: +3 units  301-345: +4 units  346-390: +5 units  >390: +6 units    Example:  Eating 80 grams of carb  Blood sugar is 215    80/10 = 8 units   Plus 2 units for blood sugar reading  Total dose of 10 units    no

## 2023-05-19 NOTE — Clinical Note
Let me know if he is able to or not able to get the Inpen.  I already scheduled him for training, but he is going to finish using his month supply of pens that he has now.

## 2023-05-23 RX ORDER — INSULIN PEN,REUSABLE,BT LISPRO
INSULIN PEN (EA) SUBCUTANEOUS
Qty: 1 EACH | Refills: 0 | Status: SHIPPED | OUTPATIENT
Start: 2023-05-23

## 2023-06-16 ENCOUNTER — TELEPHONE (OUTPATIENT)
Dept: DIABETES | Facility: CLINIC | Age: 53
End: 2023-06-16
Payer: COMMERCIAL

## 2023-06-16 ENCOUNTER — PATIENT MESSAGE (OUTPATIENT)
Dept: DIABETES | Facility: CLINIC | Age: 53
End: 2023-06-16
Payer: COMMERCIAL

## 2023-06-16 NOTE — TELEPHONE ENCOUNTER
Pt returned call to r/s appt.   R/s for July 7.   Discussed getting Inpen from [facility]'s

## 2023-06-16 NOTE — TELEPHONE ENCOUNTER
----- Message from Olive Huntley sent at 6/16/2023  3:21 PM CDT -----  Contact: [phone]  Type:  Patient Returning Call    Who Called:Rob   Who Left Message for Patient:nurse   Does the patient know what this is regarding?:appt   Would the patient rather a call back or a response via [facility]? Call back   Best Call Back Number:[phone]  Additional Information:

## 2023-07-07 ENCOUNTER — CLINICAL SUPPORT (OUTPATIENT)
Dept: DIABETES | Facility: CLINIC | Age: 53
End: 2023-07-07
Payer: COMMERCIAL

## 2023-07-07 VITALS — HEIGHT: 71 IN | BODY MASS INDEX: 24.6 KG/M2 | WEIGHT: 175.69 LBS

## 2023-07-07 DIAGNOSIS — E10.65 UNCONTROLLED TYPE 1 DIABETES MELLITUS WITH HYPERGLYCEMIA: Primary | ICD-10-CM

## 2023-07-07 PROCEDURE — G0108 PR DIAB MANAGE TRN  PER INDIV: ICD-10-PCS | Mod: S$GLB,,, | Performed by: DIETITIAN, REGISTERED

## 2023-07-07 PROCEDURE — 99999 PR PBB SHADOW E&M-EST. PATIENT-LVL II: ICD-10-PCS | Mod: PBBFAC,,, | Performed by: DIETITIAN, REGISTERED

## 2023-07-07 PROCEDURE — 99999 PR PBB SHADOW E&M-EST. PATIENT-LVL II: CPT | Mod: PBBFAC,,, | Performed by: DIETITIAN, REGISTERED

## 2023-07-07 PROCEDURE — G0108 DIAB MANAGE TRN  PER INDIV: HCPCS | Mod: S$GLB,,, | Performed by: DIETITIAN, REGISTERED

## 2023-07-07 NOTE — Clinical Note
He seems very interested in Medtronic 780g I gave him info and Ava's number.  He will be moving to [place], but does not know when, but he said he can come here for training even after he moves.  I also told him the Inpen was ready at [facility], but he was more interested in the Medtronic.  I told him to let us know what he decides.

## 2023-07-07 NOTE — PROGRESS NOTES
"Diabetes Care Specialist Follow-up Note  Author: Tom Santos RD  Date: 7/7/2023    Program Intake  Reason for Diabetes Program Visit:: Intervention  Type of Intervention:: Individual  Individual: Education  Education: Other (scheduled today for Inpen training, but pt has not picked up the Inpen.)  Type of Follow-Up: Other    Lab Results   Component Value Date    HGBA1C 7.9 (H) 01/25/2023     A1c Pre Diabetes Care Specialist Intervention:  8.1%    CURRENT DM MEDICATIONS:   Tresiba, 50 units   Novolog using IC 10 plus SS   : Subtract 1 unit  101-164: No additional insulin is needed, just dose for your carbs.  165-210: +1 unit  211-255: +2 units  256-300: +3 units  301-345: +4 units  346-390: +5 units  >390: +6 units      Clinical    Weight: 79.7 kg (175 lb 11.3 oz)   Height: 5' 11" (180.3 cm)   Body mass index is 24.51 kg/m².  Wt gain of 2 lbs since last visit on 5/19/2023      Nutritional Status  Meal Plan 24 Hour Recall - Breakfast: 2 breakfast burritos from [employer]; Diet Coke - no Novolog since BG was in 150s and pt was going work in the sun  Meal Plan 24 Hour Recall - Lunch: none  Meal Plan 24 Hour Recall - Dinner: baked chicken, asparagus, Diet Mountain Dew - no Novolog  Meal Plan 24 Hour Recall - Snack: none    Physical activity/Exercise:   No formal exercise but pt works outside in the heat striping roads    SMBG:   Fasting BG 170s-180s, but this am 141  AC lunch lower and AC dinner the lowest at low 100s-120      Today's interventions were provided through individual discussion, instruction, and written materials were provided.    Patient verbalized understanding of instruction and written materials.  Pt was able to return back demonstration of instructions today. Patient understood key points, needs reinforcement and further instruction.     Diabetes Self-Management Care Plan Review:    During today's follow-up [facility] Diabetes Self-Management Care Plan progress was reviewed and progress was " evaluated including his/her input. Rob has agreed to continue his/her journey to improve/maintain overall diabetes control by continuing to set health goals. See care plan progress below.      Care Plan: Diabetes Management   Updates made since 6/7/2023 12:00 AM        Problem: Medications         Goal: Patient agrees to take Diabetes Medication(s) as prescribed.    Start Date: 9/12/2022   Expected End Date: 3/15/2023   This Visit's Progress: No change   Recent Progress: No change   Priority: Medium   Barriers: Other (comments)   Note:    Pt consistently takes Tresiba every morning  He does not always take Novolog before his meals  He works outside in the heat and if his BG is around 150, he will not take Novolog.   He often skips meals as well, but if he checks his BG during the day and it is high, he will take Novolog based on what he feels he needs.     Reminded pt of his Novolog dose - use IC of 10 plus SS for correction.     Discussed 2 options   Pt can use Inpen which is ready at the [facility]. Showed him the renee and how it works with dosing. Gave cost of $35 per year for pen plus the cost of the insulin cartridges.   Medtronic 780g with Guardian sensor.  Pt is very interested in this system, jaye since there is no need for fingersticks. Big advantage to him would also be the financial assistance program and the feature meal detection technology.     Gave a flyer with info on Medtronic 780g and gave reps name and number.   Pt will let us know what he decides.        Problem: Healthy Eating         Goal: Eat 3 meals daily with 45-60g/3-4 servings of Carbohydrate per meal and low carb snacks.    Start Date: 9/12/2022   Expected End Date: 3/15/2023   This Visit's Progress: No change   Recent Progress: No change   Priority: Low   Barriers: Other (comments)   Note:    Pt drinks only SF beverages  He mostly eats the same meals. Sometimes he limits carbs and sometimes he does not.   Encourage to cont SF  beverages and to dose insulin per grams of carbs.        Problem: Blood Glucose Self-Monitoring         Goal: Patient agrees to check and record fasting BG and before each meal.    Start Date: 9/12/2022   Expected End Date: 3/15/2023   This Visit's Progress: On track   Recent Progress: Not met   Priority: High   Barriers: Other (comments)   Note:    He is checking BG TID - before meals  He reports that fasting BG is usually his highest readings.   Normally in the 170s-190s, but this am it was 141  AC lunch is better than fasting and AC dinner is usually the lowest from low 100s -120           Follow Up Plan     Follow up when training is needed for Inpen or for Medtronic 780g with guardian sensor.  Pt needs assessment of chronic complications and psychosocial coping.     Today's care plan and follow up schedule was discussed with patient.  Rob verbalized understanding of the care plan, goals, and agrees to follow up plan.        The patient was encouraged to communicate with his/her health care provider/physician and care team regarding his/her condition(s) and treatment.  I provided the patient with my contact information today and encouraged to contact me via phone or [facility] as needed.

## 2023-07-07 NOTE — LETTER
July 7, 2023    Rob Marx  [id number]  [place] [address]             [facility] Diabetes Education  Diabetes  [address] [place] [address]  Phone: [phone]  Fax: [phone]   July 7, 2023     Patient: Rob Marx   YOB: 1970   Date of Visit: 7/7/2023       To Whom it May Concern:    Rob Marx was seen in my clinic on 7/7/2023. He may return to work on 7/7/23.    Please excuse him from any work missed.    If you have any questions or concerns, please don't hesitate to call.    Sincerely,         Tom Santos RD

## 2023-07-15 DIAGNOSIS — R19.7 DIARRHEA, UNSPECIFIED TYPE: ICD-10-CM

## 2023-07-17 RX ORDER — LOPERAMIDE HYDROCHLORIDE 2 MG/1
CAPSULE ORAL
Qty: 40 CAPSULE | Refills: 0 | Status: SHIPPED | OUTPATIENT
Start: 2023-07-17

## 2023-07-18 ENCOUNTER — TELEPHONE (OUTPATIENT)
Dept: DIABETES | Facility: CLINIC | Age: 53
End: 2023-07-18
Payer: COMMERCIAL

## 2023-07-28 ENCOUNTER — TELEPHONE (OUTPATIENT)
Dept: DIABETES | Facility: CLINIC | Age: 53
End: 2023-07-28
Payer: COMMERCIAL

## 2023-07-28 NOTE — TELEPHONE ENCOUNTER
----- Message from Aristeo Merida sent at 7/28/2023 11:35 AM CDT -----  Contact: [phone]  Calling regarding medical necessity  form , infusion box needing to be checked off and re faxed ([id number]) . Please call back at [phone] [phone]. Thanksdj

## 2023-08-16 DIAGNOSIS — E11.9 TYPE 2 DIABETES MELLITUS WITHOUT COMPLICATION: ICD-10-CM

## 2023-09-07 ENCOUNTER — PATIENT MESSAGE (OUTPATIENT)
Dept: ADMINISTRATIVE | Facility: HOSPITAL | Age: 53
End: 2023-09-07
Payer: COMMERCIAL

## 2023-10-18 ENCOUNTER — PATIENT OUTREACH (OUTPATIENT)
Dept: ADMINISTRATIVE | Facility: HOSPITAL | Age: 53
End: 2023-10-18
Payer: COMMERCIAL

## 2023-10-18 NOTE — PROGRESS NOTES
Working eye exam report; Chart searched; Called and spoke with Antonieta who states that the patient has moved to [facility] near [place]. She confirms that the patient will not longer be seeing Dr. Cochran and his PCP. Care teams updated.

## 2023-10-25 ENCOUNTER — PATIENT OUTREACH (OUTPATIENT)
Dept: ADMINISTRATIVE | Facility: HOSPITAL | Age: 53
End: 2023-10-25
Payer: COMMERCIAL

## 2023-10-30 ENCOUNTER — TELEPHONE (OUTPATIENT)
Dept: INTERNAL MEDICINE | Facility: CLINIC | Age: 53
End: 2023-10-30
Payer: COMMERCIAL

## 2023-10-30 NOTE — TELEPHONE ENCOUNTER
----- Message from Billie Mcallister sent at 10/30/2023 11:05 AM CDT -----  Regarding: refill new pharmacy at bottom  Please refill the medication(s) listed below. Please call the patient when the prescription(s) is ready for  at this phone number             Medication #1 TRESIBA FLEXTOUCH U-200 200 unit/mL (3 mL) insulin pen        Medication #2 NOVOLOG FLEXPEN U-100 INSULIN 100 unit/mL (3 mL) InPn pen    Medication 3 propranoloL (INDERAL LA) 80 MG 24 hr capsule    Medication 4 atorvastatin (LIPITOR) 40 MG tablet    Medication 5  cholesterol medication     Medication 6 esomeprazole (NEXIUM) 40 MG capsule    Please call pt [phone] (home)             Preferred Pharmacy:     [facility]     [address], [place] [address]    [phone]

## 2024-07-08 DIAGNOSIS — R25.1 TREMOR: ICD-10-CM

## 2024-07-08 NOTE — TELEPHONE ENCOUNTER
Refill Routing Note   Medication(s) are not appropriate for processing by [facility] for the following reason(s):      No participating PCP listed in Epic: routing to Dr. Cochran as last prescribing provider  Requirement: Established primary provider participating in ORC program    [facility]C action(s):  Route Appointment due  Labs due            Appointments  past 12m or future 3m with PCP    Date Provider   Last Visit   8/31/2022 Rudy Cochran MD   Next Visit   Visit date not found Rudy Cochran MD   ED visits in past 90 days: 0        Note composed:4:23 PM 07/08/2024

## 2024-07-09 RX ORDER — PROPRANOLOL HYDROCHLORIDE 80 MG/1
CAPSULE, EXTENDED RELEASE ORAL
Qty: 30 CAPSULE | Refills: 0 | Status: SHIPPED | OUTPATIENT
Start: 2024-07-09

## 2024-08-08 DIAGNOSIS — R25.1 TREMOR: ICD-10-CM

## 2024-08-09 RX ORDER — AMLODIPINE BESYLATE 5 MG/1
TABLET ORAL
Qty: 30 TABLET | Refills: 0 | Status: SHIPPED | OUTPATIENT
Start: 2024-08-09

## 2024-08-09 RX ORDER — PROPRANOLOL HYDROCHLORIDE 80 MG/1
CAPSULE, EXTENDED RELEASE ORAL
Qty: 30 CAPSULE | Refills: 0 | Status: SHIPPED | OUTPATIENT
Start: 2024-08-09

## 2024-10-04 NOTE — TELEPHONE ENCOUNTER
----- Message from Xavi Merida sent at 4/14/2020  1:24 PM CDT -----  ..Type:  Patient Returning Call    Who Called:Antonieta Marx (Spouse)  Who Left Message for Patient:  Does the patient know what this is regarding?: monitor   Would the patient rather a call back or a response via [facility]? Call back   Best Call Back Number: [phone]  Additional Information: Antonieta Marx (Spouse) is requesting a call from nurse to discuss pt next appt a monitor for the pt sugar.   Attending